# Patient Record
Sex: FEMALE | Race: WHITE | Employment: OTHER | URBAN - METROPOLITAN AREA
[De-identification: names, ages, dates, MRNs, and addresses within clinical notes are randomized per-mention and may not be internally consistent; named-entity substitution may affect disease eponyms.]

---

## 2020-08-17 ENCOUNTER — PRE VISIT (OUTPATIENT)
Dept: CARDIOLOGY | Facility: CLINIC | Age: 85
End: 2020-08-17

## 2020-08-17 ASSESSMENT — MIFFLIN-ST. JEOR: SCORE: 1194.38

## 2020-08-17 NOTE — TELEPHONE ENCOUNTER
Called [facility] in [place], [place] and requested films of CT 08/10/20, echos 01/23/20 & 06/23/19.  Films to be mailed.    Shanelre to push WESLEY from 07/29/20    Reports reviewed by Dr Rainey, patient to be seen in clinic on Monday, with surgery later same week.

## 2020-08-18 ENCOUNTER — PREP FOR PROCEDURE (OUTPATIENT)
Dept: CARDIOLOGY | Facility: CLINIC | Age: 85
End: 2020-08-18

## 2020-08-18 DIAGNOSIS — Z01.810 PRE-OPERATIVE CARDIOVASCULAR EXAMINATION: Primary | ICD-10-CM

## 2020-08-18 DIAGNOSIS — I35.0 AORTIC STENOSIS: Primary | ICD-10-CM

## 2020-08-18 NOTE — TELEPHONE ENCOUNTER
Called and spoke to patient regarding clinic and surgery dates.  Patient spoke to sister Caty and they decided the best week for them to come to [facility] [place] is the week of 09/21.      Will schedule clinic visit on 09/21 and surgery on 09/23.  Will mail information for surgery, clinic, logistics and EVELYNE to patient.

## 2020-08-19 PROBLEM — I35.0 AORTIC STENOSIS: Status: ACTIVE | Noted: 2020-08-19

## 2020-08-19 NOTE — TELEPHONE ENCOUNTER
FUTURE VISIT INFORMATION      SURGERY INFORMATION:    Date: 20    Location: [facility] OR    Surgeon:  Jey Rainey MD     Anesthesia Type:  General    Procedure: redo sternotomy, replacement of mechanical aortic valve originally replaced in , and all other associated procedures     RECORDS REQUESTED FROM:       Primary Care Provider: Duke Christian MD - [facility]    Pertinent Medical History: Aortic Stenosis    Most recent EKG+ Tracin20    Most recent ECHO: 20Clinton Mcclain

## 2020-08-20 DIAGNOSIS — Z11.59 ENCOUNTER FOR SCREENING FOR OTHER VIRAL DISEASES: Primary | ICD-10-CM

## 2020-08-25 NOTE — PROGRESS NOTES
Emailed patient's sister Pat instructions and pass code to initiate setting up a [employer] account.

## 2020-09-07 ASSESSMENT — ENCOUNTER SYMPTOMS
HALLUCINATIONS: 0
LIGHT-HEADEDNESS: 0
FEVER: 0
FATIGUE: 1
PALPITATIONS: 0
HYPOTENSION: 0
SLEEP DISTURBANCES DUE TO BREATHING: 0
DECREASED APPETITE: 0
INCREASED ENERGY: 0
LEG PAIN: 0
SYNCOPE: 0
POLYDIPSIA: 0
POLYPHAGIA: 0
EXERCISE INTOLERANCE: 1
WEIGHT GAIN: 0
CHILLS: 0
ORTHOPNEA: 0
ALTERED TEMPERATURE REGULATION: 0
WEIGHT LOSS: 0
SWOLLEN GLANDS: 0
BRUISES/BLEEDS EASILY: 1
NIGHT SWEATS: 0
HYPERTENSION: 1

## 2020-09-14 VITALS — WEIGHT: 181.22 LBS | BODY MASS INDEX: 36.53 KG/M2 | HEIGHT: 59 IN

## 2020-09-14 DIAGNOSIS — Z79.01 LONG TERM CURRENT USE OF ANTICOAGULANT THERAPY: Primary | ICD-10-CM

## 2020-09-14 PROBLEM — I35.0 AORTIC STENOSIS: Status: RESOLVED | Noted: 2020-08-19 | Resolved: 2020-09-14

## 2020-09-14 PROBLEM — H35.3131 EARLY DRY STAGE NONEXUDATIVE AGE-RELATED MACULAR DEGENERATION OF BOTH EYES: Status: ACTIVE | Noted: 2020-01-13

## 2020-09-14 PROBLEM — I35.9 AORTIC VALVE DISORDER: Status: ACTIVE | Noted: 2020-07-21

## 2020-09-14 PROBLEM — I35.9 AORTIC VALVE DISORDER: Status: RESOLVED | Noted: 2020-07-21 | Resolved: 2020-09-14

## 2020-09-14 RX ORDER — DORZOLAMIDE HYDROCHLORIDE AND TIMOLOL MALEATE 20; 5 MG/ML; MG/ML
1 SOLUTION/ DROPS OPHTHALMIC 2 TIMES DAILY
COMMUNITY

## 2020-09-14 RX ORDER — SPIRONOLACTONE 25 MG/1
TABLET ORAL
Status: ON HOLD | COMMUNITY
Start: 2020-01-22 | End: 2020-10-02

## 2020-09-14 RX ORDER — METOPROLOL TARTRATE 50 MG
TABLET ORAL
Status: ON HOLD | COMMUNITY
Start: 2020-01-22 | End: 2020-10-02

## 2020-09-14 RX ORDER — SIMVASTATIN 20 MG
20 TABLET ORAL AT BEDTIME
COMMUNITY
Start: 2020-01-22 | End: 2021-01-26

## 2020-09-14 RX ORDER — WARFARIN SODIUM 4 MG/1
TABLET ORAL
Status: ON HOLD | COMMUNITY
Start: 2020-01-22 | End: 2020-10-02

## 2020-09-14 RX ORDER — LATANOPROST 50 UG/ML
1 SOLUTION/ DROPS OPHTHALMIC AT BEDTIME
COMMUNITY

## 2020-09-14 RX ORDER — CELECOXIB 100 MG/1
100 CAPSULE ORAL
Status: ON HOLD | COMMUNITY
Start: 2020-08-04 | End: 2020-10-02

## 2020-09-14 ASSESSMENT — MIFFLIN-ST. JEOR: SCORE: 1195.37

## 2020-09-14 NOTE — PROGRESS NOTES
Called patient to let her know her lovenox was ordered and sent to [facility] in [place] this am and reviewed stopping her coumadin and the the schedule for her lovenox.  Patient wrote down instructions and verbalized understanding.  Patient has contact information and will call with questions or concerns.

## 2020-09-18 DIAGNOSIS — Z95.2 HISTORY OF AORTIC VALVE REPLACEMENT: Primary | ICD-10-CM

## 2020-09-18 DIAGNOSIS — I35.0 AORTIC STENOSIS: ICD-10-CM

## 2020-09-18 PROBLEM — Z96.1 PSEUDOPHAKIA, BOTH EYES: Status: ACTIVE | Noted: 2020-01-13

## 2020-09-18 PROBLEM — H26.491 RIGHT POSTERIOR CAPSULAR OPACIFICATION: Status: ACTIVE | Noted: 2020-01-13

## 2020-09-18 PROBLEM — H40.1134: Status: ACTIVE | Noted: 2020-01-13

## 2020-09-18 RX ORDER — PANTOPRAZOLE SODIUM 40 MG/1
40 TABLET, DELAYED RELEASE ORAL
Status: CANCELLED | OUTPATIENT
Start: 2020-09-23

## 2020-09-18 RX ORDER — CEFAZOLIN SODIUM 2 G/50ML
2 SOLUTION INTRAVENOUS
Status: CANCELLED | OUTPATIENT
Start: 2020-09-23

## 2020-09-18 RX ORDER — ACETAMINOPHEN 325 MG/1
975 TABLET ORAL ONCE
Status: CANCELLED | OUTPATIENT
Start: 2020-09-23

## 2020-09-18 RX ORDER — CEFAZOLIN SODIUM 1 G/50ML
1 INJECTION, SOLUTION INTRAVENOUS SEE ADMIN INSTRUCTIONS
Status: CANCELLED | OUTPATIENT
Start: 2020-09-23

## 2020-09-18 RX ORDER — MUPIROCIN 20 MG/G
1 OINTMENT TOPICAL 2 TIMES DAILY
Status: CANCELLED | OUTPATIENT
Start: 2020-09-23 | End: 2020-09-24

## 2020-09-18 NOTE — PHARMACY - PREOPERATIVE ASSESSMENT CENTER
"Anticoagulation Note - Preoperative Assessment Center (PAC) Pharmacist     Patient was interviewed via phone call on September 18, 2020 prior to PAC clinic appointment. The purpose of this note is to document the perioperative anticoagulation plan outlined by the providers caring for Monika Bee.     Current Regimen  Anticoagulation Regimen as of September 18, 2020: warfarin 2 mg on fridays and 4 mg on all other days of the week. Takes at 4:30 PM. Patient started holding this today in anticipation for upcoming surgery.   Indication: mechanical AVR  Prescriber:  josephed by [facility] molina.   Expected Duration of therapy: lifelong  INR Goal: 2-3    Last Scr 1.24 mg/dL (previously ~0.95 mg/dL which appears to be baseline).  CrCl ~ 33 ml/min based on adjusted body weight   Recommend recheck SCr at preop visit to ensure no worsening of renal function.      Perioperative plan  Monika Bee is scheduled for redo sternotomy, replacement of mechanical aortic valve originally replaced in 2004 on 9/23/20 with Dr Rainey and the perioperative anticoagulation plan outlined by cardiovascular surgery team is outlined in letter from Rocio Schaefer RN on 8/19/20 and copied below for reference.     Resumption of anticoagulation after procedure will be based on surgery team assessment of bleeding risks and complications.  This plan may require re-assessment and modification by her primary team in the perioperative setting depending on patients clinical situation.        Kirshna Newman, [facility]  September 18, 2020  1:06 PM      \"Take your last dose of coumadin on Thursday September 17TH     On Saturday Sept 19th start Lovenox as follows:     Saturday 09/19 inject 1st dose of Lovenox in the evening.  Sunday 09/20 and Monday 09/21 inject one dose of Lovenox in the morning and one dose in the evening.  Tuesday 09/22 inject last dose of Lovenox in the morning only.\"  "

## 2020-09-21 ENCOUNTER — OFFICE VISIT (OUTPATIENT)
Dept: CARDIOLOGY | Facility: CLINIC | Age: 85
DRG: 228 | End: 2020-09-21
Attending: SURGERY
Payer: COMMERCIAL

## 2020-09-21 ENCOUNTER — PRE VISIT (OUTPATIENT)
Dept: SURGERY | Facility: CLINIC | Age: 85
End: 2020-09-21

## 2020-09-21 ENCOUNTER — ANCILLARY PROCEDURE (OUTPATIENT)
Dept: CT IMAGING | Facility: CLINIC | Age: 85
End: 2020-09-21
Attending: SURGERY
Payer: COMMERCIAL

## 2020-09-21 ENCOUNTER — ANESTHESIA EVENT (OUTPATIENT)
Dept: SURGERY | Facility: CLINIC | Age: 85
DRG: 228 | End: 2020-09-21
Payer: COMMERCIAL

## 2020-09-21 ENCOUNTER — ANCILLARY PROCEDURE (OUTPATIENT)
Dept: GENERAL RADIOLOGY | Facility: CLINIC | Age: 85
End: 2020-09-21
Attending: SURGERY
Payer: COMMERCIAL

## 2020-09-21 ENCOUNTER — ANCILLARY PROCEDURE (OUTPATIENT)
Dept: ULTRASOUND IMAGING | Facility: CLINIC | Age: 85
End: 2020-09-21
Attending: SURGERY
Payer: COMMERCIAL

## 2020-09-21 ENCOUNTER — OFFICE VISIT (OUTPATIENT)
Dept: SURGERY | Facility: CLINIC | Age: 85
End: 2020-09-21
Payer: COMMERCIAL

## 2020-09-21 ENCOUNTER — APPOINTMENT (OUTPATIENT)
Dept: LAB | Facility: CLINIC | Age: 85
End: 2020-09-21
Payer: COMMERCIAL

## 2020-09-21 VITALS
HEIGHT: 60 IN | DIASTOLIC BLOOD PRESSURE: 74 MMHG | SYSTOLIC BLOOD PRESSURE: 140 MMHG | BODY MASS INDEX: 35.99 KG/M2 | HEART RATE: 91 BPM | OXYGEN SATURATION: 99 %

## 2020-09-21 VITALS
BODY MASS INDEX: 34.55 KG/M2 | HEART RATE: 91 BPM | HEIGHT: 60 IN | OXYGEN SATURATION: 99 % | WEIGHT: 176 LBS | SYSTOLIC BLOOD PRESSURE: 140 MMHG | RESPIRATION RATE: 14 BRPM | DIASTOLIC BLOOD PRESSURE: 71 MMHG

## 2020-09-21 DIAGNOSIS — Z01.810 PRE-OPERATIVE CARDIOVASCULAR EXAMINATION: ICD-10-CM

## 2020-09-21 DIAGNOSIS — Z01.818 PREOP EXAMINATION: Primary | ICD-10-CM

## 2020-09-21 DIAGNOSIS — I35.0 NONRHEUMATIC AORTIC VALVE STENOSIS: Primary | ICD-10-CM

## 2020-09-21 DIAGNOSIS — Z11.59 ENCOUNTER FOR SCREENING FOR OTHER VIRAL DISEASES: ICD-10-CM

## 2020-09-21 LAB
ALBUMIN SERPL-MCNC: 3.7 G/DL (ref 3.4–5)
ALBUMIN UR-MCNC: NEGATIVE MG/DL
ALP SERPL-CCNC: 74 U/L (ref 40–150)
ALT SERPL W P-5'-P-CCNC: 22 U/L (ref 0–50)
ANION GAP SERPL CALCULATED.3IONS-SCNC: 7 MMOL/L (ref 3–14)
APPEARANCE UR: ABNORMAL
APTT PPP: 33 SEC (ref 22–37)
AST SERPL W P-5'-P-CCNC: 41 U/L (ref 0–45)
BILIRUB DIRECT SERPL-MCNC: 0.2 MG/DL (ref 0–0.2)
BILIRUB SERPL-MCNC: 0.8 MG/DL (ref 0.2–1.3)
BILIRUB UR QL STRIP: NEGATIVE
BUN SERPL-MCNC: 19 MG/DL (ref 7–30)
CALCIUM SERPL-MCNC: 9.1 MG/DL (ref 8.5–10.1)
CHLORIDE SERPL-SCNC: 106 MMOL/L (ref 94–109)
CO2 SERPL-SCNC: 26 MMOL/L (ref 20–32)
COLOR UR AUTO: YELLOW
CREAT SERPL-MCNC: 0.93 MG/DL (ref 0.52–1.04)
ERYTHROCYTE [DISTWIDTH] IN BLOOD BY AUTOMATED COUNT: 13 % (ref 10–15)
GFR SERPL CREATININE-BSD FRML MDRD: 57 ML/MIN/{1.73_M2}
GLUCOSE SERPL-MCNC: 100 MG/DL (ref 70–99)
GLUCOSE UR STRIP-MCNC: NEGATIVE MG/DL
HCT VFR BLD AUTO: 36 % (ref 35–47)
HGB BLD-MCNC: 11.5 G/DL (ref 11.7–15.7)
HGB UR QL STRIP: ABNORMAL
INR PPP: 1.54 (ref 0.86–1.14)
INTERPRETATION ECG - MUSE: NORMAL
KETONES UR STRIP-MCNC: NEGATIVE MG/DL
LEUKOCYTE ESTERASE UR QL STRIP: ABNORMAL
MCH RBC QN AUTO: 33.2 PG (ref 26.5–33)
MCHC RBC AUTO-ENTMCNC: 31.9 G/DL (ref 31.5–36.5)
MCV RBC AUTO: 104 FL (ref 78–100)
MUCOUS THREADS #/AREA URNS LPF: PRESENT /LPF
NITRATE UR QL: NEGATIVE
PH UR STRIP: 5 PH (ref 5–7)
PLATELET # BLD AUTO: 311 10E9/L (ref 150–450)
POTASSIUM SERPL-SCNC: 4.1 MMOL/L (ref 3.4–5.3)
PROT SERPL-MCNC: 7.9 G/DL (ref 6.8–8.8)
RBC # BLD AUTO: 3.46 10E12/L (ref 3.8–5.2)
RBC #/AREA URNS AUTO: 2 /HPF (ref 0–2)
SARS-COV-2 RNA SPEC QL NAA+PROBE: NOT DETECTED
SODIUM SERPL-SCNC: 138 MMOL/L (ref 133–144)
SOURCE: ABNORMAL
SP GR UR STRIP: 1.02 (ref 1–1.03)
SPECIMEN SOURCE: NORMAL
SQUAMOUS #/AREA URNS AUTO: <1 /HPF (ref 0–1)
UROBILINOGEN UR STRIP-MCNC: 0 MG/DL (ref 0–2)
WBC # BLD AUTO: 7.9 10E9/L (ref 4–11)
WBC #/AREA URNS AUTO: 35 /HPF (ref 0–5)

## 2020-09-21 PROCEDURE — 36415 COLL VENOUS BLD VENIPUNCTURE: CPT | Performed by: SURGERY

## 2020-09-21 PROCEDURE — 81001 URINALYSIS AUTO W/SCOPE: CPT | Performed by: SURGERY

## 2020-09-21 PROCEDURE — 80076 HEPATIC FUNCTION PANEL: CPT | Performed by: SURGERY

## 2020-09-21 PROCEDURE — 85730 THROMBOPLASTIN TIME PARTIAL: CPT | Performed by: SURGERY

## 2020-09-21 PROCEDURE — 86902 BLOOD TYPE ANTIGEN DONOR EA: CPT | Performed by: SURGERY

## 2020-09-21 PROCEDURE — 87086 URINE CULTURE/COLONY COUNT: CPT | Performed by: SURGERY

## 2020-09-21 PROCEDURE — 86850 RBC ANTIBODY SCREEN: CPT | Performed by: SURGERY

## 2020-09-21 PROCEDURE — 86900 BLOOD TYPING SEROLOGIC ABO: CPT | Performed by: SURGERY

## 2020-09-21 PROCEDURE — 85027 COMPLETE CBC AUTOMATED: CPT | Performed by: SURGERY

## 2020-09-21 PROCEDURE — 85610 PROTHROMBIN TIME: CPT | Performed by: SURGERY

## 2020-09-21 PROCEDURE — 86870 RBC ANTIBODY IDENTIFICATION: CPT | Performed by: SURGERY

## 2020-09-21 PROCEDURE — 86901 BLOOD TYPING SEROLOGIC RH(D): CPT | Performed by: SURGERY

## 2020-09-21 PROCEDURE — 86922 COMPATIBILITY TEST ANTIGLOB: CPT | Performed by: SURGERY

## 2020-09-21 PROCEDURE — 86905 BLOOD TYPING RBC ANTIGENS: CPT | Performed by: SURGERY

## 2020-09-21 PROCEDURE — 80048 BASIC METABOLIC PNL TOTAL CA: CPT | Performed by: SURGERY

## 2020-09-21 ASSESSMENT — PAIN SCALES - GENERAL
PAINLEVEL: NO PAIN (0)
PAINLEVEL: NO PAIN (0)

## 2020-09-21 ASSESSMENT — MIFFLIN-ST. JEOR: SCORE: 1171.89

## 2020-09-21 ASSESSMENT — LIFESTYLE VARIABLES: TOBACCO_USE: 0

## 2020-09-21 ASSESSMENT — ENCOUNTER SYMPTOMS: SEIZURES: 1

## 2020-09-21 NOTE — LETTER
9/21/2020      RE: Monika Bee  [address]  [address]  [place] [address]       Dear Colleague,    Thank you for the opportunity to participate in the care of your patient, Monika Bee, at the [facility]. Please see a copy of my visit note below.    HPI  Monika Bee is a 85 y/o female who presents for evaluation of prosthetic valve stenosis.  She had a mechanical aortic valve replacement about 20 years ago. 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 EF remains normal at 65. She also has moderate mitral stenosis. She has had a syncopal episode and has an implantable loop recorder in. She had another syncopal episode in early February, but her loop recorder interrogation on 2/20/2020 showed no obvious cardiac rhythm responsible for syncope. She had previous thoracotomy for aortic valve replacement. She does have some dyspnea on exertion, but she finds that not to be unusual. She now presents for the above procedure.     PMH is also significant for s/p right knee arthroplasty and hx left femur fracture s/p surgical repair.     History was obtained from patient & chart review.      Past Medical History  Past Medical History        Past Medical History:   Diagnosis Date     Aortic aneurysm (H) 9/19/2012     Mild dilation of the ascending aorta measuring 3.8cm. Mild dilation of the ascending aorta measuring 3.8cm.     Aortic stenosis 8/19/2020     Added automatically from request for surgery [id number]     Aortic valve disorder 7/21/2020     A. S/p aortic valve replacement 2004 with 21mm [facility] [place] B. Last echocardiogram 2006 demonstrating armida functioning valve -- mean gradient of 16mmHg. Normal LV size and function, no other significant valve disease. A. S/p aortic valve replacement 2004 with 21mm [facility] Romeo B. Last echocardiogram 2006 demonstrating armida functioning valve -- mean gradient of  16mmHg. Normal LV size and function, no oth     Dyslipidemia 12/4/2013     Early dry stage nonexudative age-related macular degeneration of both eyes 1/13/2020     Hypertension, benign essential, goal below 140/90 12/4/2013     Long term current use of anticoagulant therapy 7/16/2009     Osteoarthrosis involving lower leg 7/21/2020     Ostium secundum type atrial septal defect 9/19/2012     Closed during aortic valve replacement surgically in 2004 Closed during aortic valve replacement surgically in 2004     PFO (patent foramen ovale) 9/19/2012     Closed during aortic valve replacement surgically in 2004 Closed during aortic valve replacement surgically in 2004     Vitamin D deficiency 1/18/2013           Past Surgical History  Past Surgical History         Past Surgical History:   Procedure Laterality Date     AS TOTAL KNEE ARTHROPLASTY Right 2015     CARPAL TUNNEL RELEASE RT/LT         CATARACT IOL, RT/LT         FEMUR SURGERY Left       fracture repair     REPAIR VALVE AORTIC          Prior to Admission Medications  Active Medications          Current Outpatient Medications   Medication Sig Dispense Refill     calcium carbonate 600 mg-vitamin D 400 units (CALTRATE) 600-400 MG-UNIT per tablet Take 2 tablets by mouth daily         carboxymethylcellulose PF (REFRESH PLUS) 0.5 % ophthalmic solution Place 1 drop into both eyes 3 times daily as needed for dry eyes         celecoxib (CELEBREX) 100 MG capsule Take 100 mg by mouth daily (with breakfast)          dorzolamide-timolol (COSOPT) 2-0.5 % ophthalmic solution Place 1 drop into both eyes 2 times daily          latanoprost (XALATAN) 0.005 % ophthalmic solution Place 1 drop into both eyes At Bedtime          Methylcellulose, Laxative, (CITRUCEL PO) Take 2 capsules by mouth daily         metoprolol tartrate (LOPRESSOR) 50 MG tablet TAKE 1 TABLET BY MOUTH TWICE A DAY         Multiple Vitamins-Minerals (OCUVITE ADULT FORMULA PO) Take 1 tablet by mouth daily           multivitamin (CENTRUM SILVER) tablet Take 1 tablet by mouth daily         simvastatin (ZOCOR) 20 MG tablet Take 20 mg by mouth At Bedtime          spironolactone (ALDACTONE) 25 MG tablet TAKE 1 TABLET(25 MG) BY MOUTH EVERY DAY IN THE MORNING         warfarin ANTICOAGULANT (COUMADIN) 4 MG tablet Take 2 mg (1/2 tablet) on Fridays and 4 mg by mouth on all other days of the week.  Takes at 4:30 PM.               Allergies  No Known Allergies     Social History  Social History   Social History            Socioeconomic History     Marital status: Single       Spouse name: Not on file     Number of children: Not on file     Years of education: Not on file     Highest education level: Not on file   Occupational History     Not on file   Social Needs     Financial resource strain: Not on file     Food insecurity       Worry: Not on file       Inability: Not on file     Transportation needs       Medical: Not on file       Non-medical: Not on file   Tobacco Use     Smoking status: Never Smoker     Smokeless tobacco: Never Used   Substance and Sexual Activity     Alcohol use: Yes       Alcohol/week: 6.0 standard drinks       Types: 6 Glasses of wine per week       Frequency: 2-4 times a month       Drinks per session: 1 or 2       Binge frequency: Weekly     Drug use: Not on file     Sexual activity: Not on file   Lifestyle     Physical activity       Days per week: Not on file       Minutes per session: Not on file     Stress: Not on file   Relationships     Social connections       Talks on phone: Not on file       Gets together: Not on file       Attends [employer] service: Not on file       Active member of club or organization: Not on file       Attends meetings of clubs or organizations: Not on file       Relationship status: Not on file     Intimate partner violence       Fear of current or ex partner: Not on file       Emotionally abused: Not on file       Physically abused: Not on file       Forced sexual activity: Not  "on file   Other Topics Concern     Not on file   Social History Narrative     Not on file           Family History  Family History      Reviewed    ROS  10 point review of systems is negative other than noted in the HPI or here.        Physical Exam    BP: 130/76 Pulse: 91   Resp: 14 SpO2: 99 %          176 lbs 0 oz  4' 11.5\"   Body mass index is 34.95 kg/m .  Constitutional: Awake, alert, cooperative, not in distress  Eyes: Pupils equal, round and reactive to light, extra ocular muscles intact, sclera clear, conjunctiva normal.  HENT: Normocephalic, oral pharynx with moist mucus membranes, good dentition. No goiter appreciated. No removable dental hardware.  Respiratory: Clear to auscultation bilaterally, no crackles or wheezing. No SOB when supine.  Cardiovascular: Regular rate and rhythm, normal S1 and S2, and 2/6 murmur noted.  Carotids +2, no bruits. No edema. Palpable pulses to radial, DP and PT arteries. Sternotomy scar well healed.  GI: Normal bowel sounds, soft, obese, non-tender   Skin: Warm and dry.  No rashes.   Musculoskeletal: mildly limited extension ROM of neck. There is no redness, warmth, or swelling of the joints. Gross motor strength is normal.    Neurologic: no focal deficits  Neuropsychiatric: Calm, cooperative. Normal affect.     ECHOCARDIOGRAM 7/29/20  CONCLUSION:  1. Normal left ventricular size and function.  Ejection fraction is 65%.  2. Mechanical aortic valve prosthesis with functional moderate stenosis and trivial regurgitation.  3. Mild mitral stenosis.   4. No evidence of intracardiac shunt, thrombus or vegetation.     HEART CATH 7/29/20  Conclusions    1. Normal coronary arteries.    2. Malfunctioning mechanical aortic valve with the leaflets not fully  opening during systole.  Recommendations    * Cardiothoracic surgical consultation.  Diagnostic Summary    * Left main is normal.    * Left anterior descending artery had mild luminal irregularities.    * Left circumflex is " nondominant and normal.    * The right coronary artery is the dominant vessel with mild luminal  irregularities.    * On fluoroscopy, aortic valve leaflets do open and close.  However, the  leaflets do not fully open suggesting likely scar tissue present around the  mechanical aortic valve.    ASSESSMENT and PLAN  Monika Bee is a 84 year old female with prosthetic aortic valve stenosis with symptoms. I agree with the plan for redo AVR. I discussed the risks and benefits of surgery including risks of death, bleeding, stroke, infection, renal failure and pacemaker. She understands and is willing to proceed with surgery. I have told her that we will try to place a tissue valve but for anatomic reasons, a mechanical valve may be used. She understands this and is willing to proceed with surgery.      Please do not hesitate to contact me if you have any questions/concerns.     Sincerely,     Jey Rainey MD

## 2020-09-21 NOTE — PATIENT INSTRUCTIONS
Preparing for Your Surgery      Name:  Monika Bee   MRN:  [id number]   :  1935   Today's Date:  2020       Arriving for surgery:  Surgery date:  2020  Arrival time:  5:30AM    Restrictions due to COVID 19:  Patients are allowed one visitor in the pre-op period  All visitors must wear a mask  No visitors under 18  No ill visitors   parking is not available     Please come to:       [facility] [address] [place] [address]  [id number]     -    Please proceed to the Surgery Lounge on the 3rd floor. [phone]?     - ?If you are in need of directions, wheelchair or escort please stop at the Information Desk in the lobby.  Inform the information person that you are here for surgery; a wheelchair and escort will be provided to the Surgery Lounge .?     What can I eat or drink?  -  You may eat and drink normally for up to 8 hours before your surgery. (Until 2020, 11:30PM)  -  You may have clear liquids until 2 hours before surgery. (Until 2020, 5:30AM)  Examples of clear liquids:  Water  Clear broth  Juices (apple, white grape, white cranberry  and cider) without pulp  Noncarbonated, powder based beverages  (lemonade and Rodrigue-Aid)  Sodas (Sprite, 7-Up, ginger ale and seltzer)  Coffee or tea (without milk or cream)  Gatorade    -  No Alcohol for at least 24 hours before surgery     Which medicines can I take?    Hold Multivitamins for 10 days before surgery.  Hold Supplements for 10 days before surgery.  Please hold all antinflammatory medications for 10 days prior, including Celecoxib(Celebrex).    Wafarin(Coumadin) and Enoxaparin(Lovenox) per Rocio Schaefer RN instructions:  Take your last dose of coumadin on      On  start Lovenox as follows:      inject 1st dose of Lovenox in the evening.   and  inject one dose of Lovenox in the morning and one dose in the  evening.  Tuesday 09/22 inject last dose of Lovenox in the morning only, take as early as 6 AM.     -  DO NOT take these medications the day of surgery:    Citrucel   Spironolactone    -  PLEASE TAKE these medications the day of surgery:    Cosopt eye drops   Refresh eye drops as needed    Metoprolol        How do I prepare myself?  - Please shower the evening before and the morning of surgery using Scrubcare or Hibiclens soap.    Use this soap only from the neck to your toes.     Leave the soap on your skin for one minute--then rinse thoroughly.      You may use your own shampoo and conditioner; no other hair products.   - Please remove all jewelry and body piercings.  - No lotions, deodorants or fragrance.  - No makeup or fingernail polish.   - Bring your ID and insurance card.    - All patients are required to have a Covid-19 test within 4 days of surgery/procedure.      -Patients will be contacted by the [facility] scheduling team within 1 week of surgery to make an appointment.      - Patients may call the Scheduling team at [phone] if they have not been scheduled within 4 days of  surgery.        Questions or Concerns:    - For any questions regarding the day of surgery or your hospital stay, please contact the Pre Admission Nursing Office at [phone].       - If you have health changes between today and your surgery please call your surgeon.       For questions after surgery please call your surgeons office.

## 2020-09-21 NOTE — H&P
Pre-Operative H & P     CC:  Preoperative exam to assess for increased cardiopulmonary risk while undergoing surgery and anesthesia.    Date of Encounter: 9/21/2020  Primary Care Physician:  [facility]  Reason for Visit: Aortic stenosis    HPI  Monika Bee is a 83 y/o female who presents for pre-operative H&P in preparation for redo sternotomy, replacement of mechanical aortic valve originally replaced in 2004, and all other associated procedures with Jey Rainey MD on 9/23/20 at [facility] for treatment of Aortic stenosis.    Ms. Bee has a history of aortic stenosis. She had a mechanical aortic valve replacement about 20 years ago. 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 EF remains normal at 65. She also has moderate mitral stenosis. She has had a syncopal episode and has an implantable loop recorder in. She had another syncopal episode in early February, but her loop recorder interrogation on 2/20/2020 showed no obvious cardiac rhythm responsible for syncope. She had previous thoracotomy for aortic valve replacement. She does have some dyspnea on exertion, but she finds that not to be unusual. She now presents for the above procedure.    PMH is also significant for s/p right knee arthroplasty and hx left femur fracture s/p surgical repair.    History was obtained from patient & chart review.     Past Medical History  Past Medical History:   Diagnosis Date     Aortic aneurysm (H) 9/19/2012    Mild dilation of the ascending aorta measuring 3.8cm. Mild dilation of the ascending aorta measuring 3.8cm.     Aortic stenosis 8/19/2020    Added automatically from request for surgery [id number]     Aortic valve disorder 7/21/2020    A. S/p aortic valve replacement 2004 with 21mm [facility] [place] B. Last echocardiogram 2006 demonstrating armida functioning valve -- mean gradient of 16mmHg. Normal LV size and function,  no other significant valve disease. A. S/p aortic valve replacement 2004 with 21mm [facility] [place] B. Last echocardiogram 2006 demonstrating armida functioning valve -- mean gradient of 16mmHg. Normal LV size and function, no oth     Dyslipidemia 12/4/2013     Early dry stage nonexudative age-related macular degeneration of both eyes 1/13/2020     Hypertension, benign essential, goal below 140/90 12/4/2013     Long term current use of anticoagulant therapy 7/16/2009     Osteoarthrosis involving lower leg 7/21/2020     Ostium secundum type atrial septal defect 9/19/2012    Closed during aortic valve replacement surgically in 2004 Closed during aortic valve replacement surgically in 2004     PFO (patent foramen ovale) 9/19/2012    Closed during aortic valve replacement surgically in 2004 Closed during aortic valve replacement surgically in 2004     Vitamin D deficiency 1/18/2013       Past Surgical History  Past Surgical History:   Procedure Laterality Date     AS TOTAL KNEE ARTHROPLASTY Right 2015     CARPAL TUNNEL RELEASE RT/LT       CATARACT IOL, RT/LT       FEMUR SURGERY Left     fracture repair     REPAIR VALVE AORTIC  2004       Hx of Blood transfusions/reactions: no     Hx of abnormal bleeding or anti-platelet use: on warfarin    Menstrual history: No LMP recorded. Patient is postmenopausal.:      Steroid use in the last year: no    Personal or FH with difficulty with Anesthesia:  no    Prior to Admission Medications  Current Outpatient Medications   Medication Sig Dispense Refill     calcium carbonate 600 mg-vitamin D 400 units (CALTRATE) 600-400 MG-UNIT per tablet Take 2 tablets by mouth daily       carboxymethylcellulose PF (REFRESH PLUS) 0.5 % ophthalmic solution Place 1 drop into both eyes 3 times daily as needed for dry eyes       celecoxib (CELEBREX) 100 MG capsule Take 100 mg by mouth daily (with breakfast)        dorzolamide-timolol (COSOPT) 2-0.5 % ophthalmic solution Place 1 drop into both eyes 2 times daily         latanoprost (XALATAN) 0.005 % ophthalmic solution Place 1 drop into both eyes At Bedtime        Methylcellulose, Laxative, (CITRUCEL PO) Take 2 capsules by mouth daily       metoprolol tartrate (LOPRESSOR) 50 MG tablet TAKE 1 TABLET BY MOUTH TWICE A DAY       Multiple Vitamins-Minerals (OCUVITE ADULT FORMULA PO) Take 1 tablet by mouth daily        multivitamin (CENTRUM SILVER) tablet Take 1 tablet by mouth daily       simvastatin (ZOCOR) 20 MG tablet Take 20 mg by mouth At Bedtime        spironolactone (ALDACTONE) 25 MG tablet TAKE 1 TABLET(25 MG) BY MOUTH EVERY DAY IN THE MORNING       warfarin ANTICOAGULANT (COUMADIN) 4 MG tablet Take 2 mg (1/2 tablet) on Fridays and 4 mg by mouth on all other days of the week.  Takes at 4:30 PM.         Allergies  No Known Allergies    Social History  Social History     Socioeconomic History     Marital status: Single     Spouse name: Not on file     Number of children: Not on file     Years of education: Not on file     Highest education level: Not on file   Occupational History     Not on file   Social Needs     Financial resource strain: Not on file     Food insecurity     Worry: Not on file     Inability: Not on file     Transportation needs     Medical: Not on file     Non-medical: Not on file   Tobacco Use     Smoking status: Never Smoker     Smokeless tobacco: Never Used   Substance and Sexual Activity     Alcohol use: Yes     Alcohol/week: 6.0 standard drinks     Types: 6 Glasses of wine per week     Frequency: 2-4 times a month     Drinks per session: 1 or 2     Binge frequency: Weekly     Drug use: Not on file     Sexual activity: Not on file   Lifestyle     Physical activity     Days per week: Not on file     Minutes per session: Not on file     Stress: Not on file   Relationships     Social connections     Talks on phone: Not on file     Gets together: Not on file     Attends Yazidism service: Not on file     Active member of club or organization: Not on file  "    Attends meetings of clubs or organizations: Not on file     Relationship status: Not on file     Intimate partner violence     Fear of current or ex partner: Not on file     Emotionally abused: Not on file     Physically abused: Not on file     Forced sexual activity: Not on file   Other Topics Concern     Not on file   Social History Narrative     Not on file       Family History  Family History   Problem Relation Age of Onset     Cardiovascular Father      Colon Cancer Father      Anesthesia Reaction No family hx of      Deep Vein Thrombosis (DVT) No family hx of            Preop Vitals    BP Readings from Last 3 Encounters:   09/21/20 (!) 140/71   09/21/20 (!) 140/74    Pulse Readings from Last 3 Encounters:   09/21/20 91   09/21/20 91      Resp Readings from Last 3 Encounters:   09/21/20 14    SpO2 Readings from Last 3 Encounters:   09/21/20 99%   09/21/20 99%      Temp Readings from Last 1 Encounters:   No data found for Temp    Ht Readings from Last 1 Encounters:   09/21/20 1.511 m (4' 11.5\")      Wt Readings from Last 1 Encounters:   09/21/20 79.8 kg (176 lb)    Estimated body mass index is 34.95 kg/m  as calculated from the following:    Height as of this encounter: 1.511 m (4' 11.5\").    Weight as of this encounter: 79.8 kg (176 lb).       ROS/MED HX  The complete review of systems is negative other than noted in the HPI or here.  Patient denies recent illness, fever and respiratory infection during past month.  Pt denies steroid use during past year.    ENT/Pulmonary:  - neg pulmonary ROS    (-) tobacco use, asthma and sleep apnea   Neurologic:     (+)seizures last seizure: 1967    (-) CVA and Neuropathy   Cardiovascular: Comment: S/P AVR in 2004    (+) Dyslipidemia, hypertension----. Taking blood thinners, on Lovenox bridge currently . . . :. valvular problems/murmurs type: AS . Previous cardiac testing Echodate:7/29/20results:date: results:ECG reviewed date:7/29/20 results:Cath date: 7/29/20 results: " "         METS/Exercise Tolerance: Comment: Has significant DA SILVA when ambulating 1 - Eating, dressing   Hematologic:  - neg hematologic  ROS      (-) history of blood clots and History of Transfusion   Musculoskeletal: Comment: S/P right knee arthroplasty    Hx left femur fracture, s/p surgery    Diffuse arthritis        GI/Hepatic:  - neg GI/hepatic ROS      (-) GERD and liver disease   Renal/Genitourinary:  - ROS Renal section negative       Endo:     (+) Obesity, .   (-) Type II DM   Psychiatric:  - neg psychiatric ROS       Infectious Disease:  - neg infectious disease ROS       Malignancy:      - no malignancy   Other:    (+) H/O Chronic Pain,               PHYSICAL EXAM:   Mental Status/Neuro: A/A/O; Age Appropriate   Airway: Facies: Feasible  Mallampati: II  Mouth/Opening: Full  TM distance: > 6 cm  Neck ROM: Full   Respiratory: Auscultation: CTAB     Resp. Rate: Normal     Resp. Effort: Normal      CV: Rhythm: Regular  Rate: Age appropriate  Heart: Murmur (Systolic; soft)  Edema: None   Comments:      Dental: Normal Dentition                BP: (!) 140/71 Pulse: 91   Resp: 14 SpO2: 99 %         176 lbs 0 oz  4' 11.5\"   Body mass index is 34.95 kg/m .    Physical Exam  Constitutional: Awake, alert, cooperative, no apparent distress, and appears stated age.  Eyes: Pupils equal, round and reactive to light, extra ocular muscles intact, sclera clear, conjunctiva normal.  HENT: Normocephalic, oral pharynx with moist mucus membranes, good dentition. No goiter appreciated. No removable dental hardware.  Respiratory: Clear to auscultation bilaterally, no crackles or wheezing. No SOB when supine.  Cardiovascular: Regular rate and rhythm, normal S1 and S2, and 2/6 murmur noted.  Carotids +2, no bruits. No edema. Palpable pulses to radial, DP and PT arteries. Sternotomy scar well healed.  GI: Normal bowel sounds, soft, obese, non-tender, no masses palpated.    Lymph/Hematologic: No cervical lymphadenopathy and no " supraclavicular lymphadenopathy.  Genitourinary:  deferred  Skin: Warm and dry.  No rashes.   Musculoskeletal: mildly limited extension ROM of neck. There is no redness, warmth, or swelling of the joints. Gross motor strength is normal.    Neurologic: Awake, alert, oriented to name, place and time. Cranial nerves II-XII are grossly intact. Gait is antalgic. Ambulates from chair to exam table, seats self, lies supine and sits back up w/o assistance.  Neuropsychiatric: Calm, cooperative. Normal affect. Pleasant. Answers questions appropriately, follows commands w/o difficulty.      PRIOR LABS/DIAGNOSTIC STUDIES:  All labs and imaging personally reviewed    EKG 7/29/20  Sinus rhythm with Premature supraventricular complexes  Otherwise normal ECG  No previous ECGs available  Ventricular rate 88 bpm    ECHOCARDIOGRAM 7/29/20  CONCLUSION:  1. Normal left ventricular size and function.  Ejection fraction is 65%.  2. Mechanical aortic valve prosthesis with functional moderate stenosis and trivial regurgitation.  3. Mild mitral stenosis.   4. No evidence of intracardiac shunt, thrombus or vegetation.    HEART CATH 7/29/20  Conclusions    1. Normal coronary arteries.    2. Malfunctioning mechanical aortic valve with the leaflets not fully  opening during systole.  Recommendations    * Cardiothoracic surgical consultation.  Diagnostic Summary    * Left main is normal.    * Left anterior descending artery had mild luminal irregularities.    * Left circumflex is nondominant and normal.    * The right coronary artery is the dominant vessel with mild luminal  irregularities.    * On fluoroscopy, aortic valve leaflets do open and close.  However, the  leaflets do not fully open suggesting likely scar tissue present around the  mechanical aortic valve.    CT chest abdomen and pelvis angiogram 8/10/20  FINDINGS:     CTA CHEST:    There is coronary artery calcification and there has been previous median sternotomy. There is additional  atherosclerotic calcification in the thoracic aorta. No acute aortic abnormalities.    No mediastinal, hilar or axillary lymphadenopathy.    The lungs are clear without consolidation or pleural effusion. There are no suspicious pulmonary nodules. There is a granuloma within the right upper lobe.    CTA ABDOMEN PELVIS:    No acute abnormalities within the liver or spleen. No acute findings in the kidneys, adrenal glands, pancreas stomach or gallbladder.    There is patchy atherosclerotic calcification within the abdominal aorta. There is no aneurysm. The mesenteric vessels are patent.    There are no discrete acute findings within the bowel or mesentery.    There is mild aponeurotic calcification in the common, internal and external iliac arteries. No vascular stenosis or occlusion.    There is a 1.9 cm area of contrast accumulation in the right iliopsoas muscle near the right femoral artery (arterial image 237 of 259). There is additional note of masslike enlargement of the right iliacus muscle in the pelvis (image 190) likely secondary to intramuscular hematoma. No additional periaortic hematoma.    There are degenerative changes in the thoracolumbar spine and pelvis. No acute osseous abnormalities detected.    IMPRESSION:    1.9 cm focus of contrast accumulation in the right iliopsoas muscle at the level of the right hip. Diagnostic considerations would include a pseudoaneurysm or focus of intramuscular bleeding.    Masslike enlargement of the right iliacus muscle in the pelvis likely secondary to intramuscular hematoma.    No acute CT findings in the thoracoabdominal aorta.        LABS:  CBC:   Lab Results   Component Value Date    WBC 7.9 09/21/2020    HGB 11.5 (L) 09/21/2020    HCT 36.0 09/21/2020     09/21/2020     BMP:   Lab Results   Component Value Date     09/21/2020    POTASSIUM 4.1 09/21/2020    CHLORIDE 106 09/21/2020    CO2 26 09/21/2020    BUN 19 09/21/2020    CR 0.93 09/21/2020    GLC  100 (H) 09/21/2020     COAGS:   Lab Results   Component Value Date    PTT 33 09/21/2020    INR 1.54 (H) 09/21/2020     POC: No results found for: BGM, HCG, HCGS  OTHER:   Lab Results   Component Value Date    RYANN 9.1 09/21/2020    ALBUMIN 3.7 09/21/2020    PROTTOTAL 7.9 09/21/2020    ALT 22 09/21/2020    AST 41 09/21/2020    ALKPHOS 74 09/21/2020    BILITOTAL 0.8 09/21/2020          Outside records reviewed from: Care Everywhere    ASSESSMENT and PLAN  Monika Bee is a 84 year old female scheduled to undergo redo sternotomy, replacement of mechanical aortic valve originally replaced in 2004, and all other associated procedures with Jey Rainey MD on 9/23/20 at [facility] for treatment of Aortic stenosis.     Pt has had prior anesthetic.     No history of anesthetic complications    She has the following specific operative considerations:   # SANJUANITA 3/8 = intermediate risk  # VTE risk: 0.5%  # Risk of PONV score = 3.  If > 2, anti-emetic intervention recommended.  # Anesthesia considerations:  Refer to PAC assessment in anesthesia records    # Increased risk of postoperative nausea/vomiting: Recommend use of antiemetic agents in the perioperative period.          CARDIAC: METS 1-2,  Has significant DA SILVA when ambulating      # RCRI : High risk surgery.  0.9% risk of major adverse cardiac event.     #  Severe aortic stenosis, s/p AVR in 2004, now w/ recurrence     #  Cath 7/29/20: no CAD     #  Echo 7/29/20:  EF 65%       PULMONARY:     # Never smoked    # No asthma or inhaler use    GI: denies GERD      ENDO: BMI 38    # No DM    HEME:   # Anticoagulated with coumadin, currently on Lovenox bridge (last dose scheduled for tomorrow AM)  # Positive antibody screen on T&S drawn today    ORTHO: mildly limited extension ROM of neck, no TMJ    # s/p right knee arthroplasty 2015    # Hx left femur fracture, s/p surgery    Patient was discussed with Dr Prasad. Patient is  optimized and is acceptable candidate for the proposed procedure. No further diagnostic evaluation is needed.    Arrival time, NPO, shower and medication instructions provided by nursing staff today.  Preparing For Your Surgery handout given.      Lala Espinoza PA-C  Preoperative Assessment Center  [facility]  Phone: [phone]  Fax: [phone]

## 2020-09-21 NOTE — NURSING NOTE
Chief Complaint   Patient presents with     New Patient     ump new consult for redo sternotomy      Vitals were taken and medications were reconciled.   Negin Rodríguez  2:28 PM

## 2020-09-21 NOTE — ANESTHESIA PREPROCEDURE EVALUATION
"Anesthesia Pre-Procedure Evaluation    Patient: Monika Bee   MRN:     [id number] Gender:   female   Age:    84 year old :      1935        Preoperative Diagnosis: Aortic stenosis [I35.0]   Procedure(s):  redo sternotomy, replacement of mechanical aortic valve originally replaced in , and all other associated procedures     LABS:  CBC:   Lab Results   Component Value Date    WBC 7.9 2020    HGB 11.5 (L) 2020    HCT 36.0 2020     2020     BMP:   Lab Results   Component Value Date     2020    POTASSIUM 4.1 2020    CHLORIDE 106 2020    CO2 26 2020    BUN 19 2020    CR 0.93 2020     (H) 2020     COAGS:   Lab Results   Component Value Date    PTT 33 2020    INR 1.54 (H) 2020     POC: No results found for: BGM, HCG, HCGS  OTHER:   Lab Results   Component Value Date    RYANN 9.1 2020    ALBUMIN 3.7 2020    PROTTOTAL 7.9 2020    ALT 22 2020    AST 41 2020    ALKPHOS 74 2020    BILITOTAL 0.8 2020        Preop Vitals    BP Readings from Last 3 Encounters:   20 (!) 140/71   20 (!) 140/74    Pulse Readings from Last 3 Encounters:   20 91   20 91      Resp Readings from Last 3 Encounters:   20 14    SpO2 Readings from Last 3 Encounters:   20 99%   20 99%      Temp Readings from Last 1 Encounters:   No data found for Temp    Ht Readings from Last 1 Encounters:   20 1.511 m (4' 11.5\")      Wt Readings from Last 1 Encounters:   20 79.8 kg (176 lb)    Estimated body mass index is 34.95 kg/m  as calculated from the following:    Height as of this encounter: 1.511 m (4' 11.5\").    Weight as of this encounter: 79.8 kg (176 lb).     LDA:        Past Medical History:   Diagnosis Date     Aortic aneurysm (H) 2012    Mild dilation of the ascending aorta measuring 3.8cm. Mild dilation of the ascending aorta measuring 3.8cm.     " Aortic stenosis 8/19/2020    Added automatically from request for surgery [id number]     Aortic valve disorder 7/21/2020    A. S/p aortic valve replacement 2004 with 21mm St. Romeo B. Last echocardiogram 2006 demonstrating armida functioning valve -- mean gradient of 16mmHg. Normal LV size and function, no other significant valve disease. A. S/p aortic valve replacement 2004 with 21mm St. Romeo B. Last echocardiogram 2006 demonstrating armida functioning valve -- mean gradient of 16mmHg. Normal LV size and function, no oth     Dyslipidemia 12/4/2013     Early dry stage nonexudative age-related macular degeneration of both eyes 1/13/2020     Hypertension, benign essential, goal below 140/90 12/4/2013     Long term current use of anticoagulant therapy 7/16/2009     Osteoarthrosis involving lower leg 7/21/2020     Ostium secundum type atrial septal defect 9/19/2012    Closed during aortic valve replacement surgically in 2004 Closed during aortic valve replacement surgically in 2004     PFO (patent foramen ovale) 9/19/2012    Closed during aortic valve replacement surgically in 2004 Closed during aortic valve replacement surgically in 2004     Vitamin D deficiency 1/18/2013      Past Surgical History:   Procedure Laterality Date     AS TOTAL KNEE ARTHROPLASTY Right 2015     CARPAL TUNNEL RELEASE RT/LT       CATARACT IOL, RT/LT       FEMUR SURGERY Left     fracture repair     REPAIR VALVE AORTIC  2004      No Known Allergies     Anesthesia Evaluation     . Pt has had prior anesthetic.     No history of anesthetic complications          ROS/MED HX    ENT/Pulmonary:  - neg pulmonary ROS    (-) tobacco use, asthma and sleep apnea   Neurologic:     (+)seizures last seizure: 1967    (-) CVA and Neuropathy   Cardiovascular: Comment: S/P AVR in 2004    (+) Dyslipidemia, hypertension----. Taking blood thinners : Instructions Given to patient: on Lovenox bridge currently. . . :. valvular problems/murmurs type: AS . Previous cardiac  testing Echodate:7/29/20results:CONCLUSION:  1. Normal left ventricular size and function. Ejection fraction is 65%.    2. Mechanical aortic valve prosthesis with functional moderate stenosis and trivial regurgitation.  -Had been replaced with a mechanical prosthesis. The valve itself was not well visualized due to significant artifact. The peak velocity through the valve was 3.7 m/sec with a mean gradient of 32 mmHg consistent with moderate functional aortic stenosis. There was trivial valvular regurgitation.    3. Mild mitral stenosis.     4. No evidence of intracardiac shunt, thrombus or vegetation.     date: results:ECG reviewed date:7/29/20 results:Cath date: 7/29/20 results:Conclusions    1. Normal coronary arteries.    2. Malfunctioning mechanical aortic valve with the leaflets not fully  opening during systole.  Recommendations    * Cardiothoracic surgical consultation.  Diagnostic Summary    * Left main is normal.    * Left anterior descending artery had mild luminal irregularities.    * Left circumflex is nondominant and normal.    * The right coronary artery is the dominant vessel with mild luminal  irregularities.    * On fluoroscopy, aortic valve leaflets do open and close.  However, the  leaflets do not fully open suggesting likely scar tissue present around the  mechanical aortic valve.          METS/Exercise Tolerance: Comment: Has significant DA SILVA when ambulating 1 - Eating, dressing   Hematologic: Comments: Type and screen + for antibodies  - neg hematologic  ROS   (+) History of Transfusion -     (-) history of blood clots   Musculoskeletal: Comment: S/P right knee arthroplasty    Hx left femur fracture, s/p surgery    Diffuse arthritis        GI/Hepatic:  - neg GI/hepatic ROS      (-) GERD and liver disease   Renal/Genitourinary:  - ROS Renal section negative       Endo:     (+) Obesity, .   (-) Type II DM   Psychiatric:  - neg psychiatric ROS       Infectious Disease:  - neg infectious disease  ROS       Malignancy:      - no malignancy   Other:    (+) H/O Chronic Pain,                       PHYSICAL EXAM:   Mental Status/Neuro: A/A/O; Age Appropriate   Airway: Facies: Feasible  Mallampati: II  Mouth/Opening: Full  TM distance: > 6 cm  Neck ROM: Full   Respiratory: Auscultation: CTAB     Resp. Rate: Normal     Resp. Effort: Normal      CV: Rhythm: Regular  Rate: Age appropriate  Heart: Murmur (Systolic; soft)  Edema: None   Comments:      Dental: Normal Dentition                Assessment:   ASA SCORE: 3    H&P: History and physical reviewed and following examination; no interval change.   Smoking Status:  Non-Smoker/Unknown   NPO Status: NPO Appropriate     Plan:   Anes. Type:  General   Pre-Medication: None   Induction:  IV (Standard)   Airway: ETT; Oral   Access/Monitoring: PIV; 2nd PIV; A-Line; MAC-Line; PAC   Maintenance: Balanced     Blood products: Blood in Room; FFP; PRBC; Cell Saver     Drips/Meds: Vasopressin; Epinephrine; Norepi     Advanced Monitoring: BIS; WESLEY Adult; NIRS (cerebral)            ADULT WESLEY Checklist:               Absolute Contra-Indications: NONE               Relative Contra-Indications:  NONE               Final Plan: Proceed with WESLEY     Postop Plan:   Postop Pain: Opioids  Postop Sedation/Airway: Not planned  Disposition: ICU     PONV Management:   Adult Risk Factors: Female, Non-Smoker, Postop Opioids   Prevention: Ondansetron, Propofol     CONSENT: Direct conversation   Plan and risks discussed with: Patient                   PAC Discussion and Assessment    ASA Classification: 3  Case is suitable for: [place]  Anesthetic techniques and relevant risks discussed: GA  Invasive monitoring and risk discussed: No  Types:   Possibility and Risk of blood transfusion discussed: No  NPO instructions given:   Additional anesthetic preparation and risks discussed:   Needs early admission to pre-op area:   Other:     PAC Resident/NP Anesthesia Assessment:  Monika Bee is a 84  year old female scheduled to undergo redo sternotomy, replacement of mechanical aortic valve originally replaced in 2004, and all other associated procedures with Jey Rainey MD on 9/23/20 at [facility] for treatment of Aortic stenosis.     Pt has had prior anesthetic.     No history of anesthetic complications    She has the following specific operative considerations:   # SANJUANITA 3/8 = intermediate risk  # VTE risk: 0.5%  # Risk of PONV score = 3.  If > 2, anti-emetic intervention recommended.  # Anesthesia considerations:  Refer to PAC assessment in anesthesia records    # Increased risk of postoperative nausea/vomiting: Recommend use of antiemetic agents in the perioperative period.          CARDIAC: METS 1-2,  Has significant DA SILVA when ambulating      # RCRI : High risk surgery.  0.9% risk of major adverse cardiac event.     #  Severe aortic stenosis, s/p AVR in 2004, now w/ recurrence     #  Cath 7/29/20: no CAD     #  Echo 7/29/20:  EF 65%       PULMONARY:     # Never smoked    # No asthma or inhaler use    GI: denies GERD      ENDO: BMI 38    # No DM    HEME:   # Anticoagulated with coumadin, currently on Lovenox bridge (last dose scheduled for tomorrow AM)  # Positive antibody screen on T&S drawn today    ORTHO: mildly limited extension ROM of neck, no TMJ    # s/p right knee arthroplasty 2015    # Hx left femur fracture, s/p surgery    Patient was discussed with Dr Prasad. Patient is optimized and is acceptable candidate for the proposed procedure. No further diagnostic evaluation is needed.      Reviewed and Signed by PAC Mid-Level Provider/Resident  Mid-Level Provider/Resident: Lala Espinoza PA-C  Date: 9/21/20  Time: 1649    Attending Anesthesiologist Anesthesia Assessment:        Anesthesiologist:   Date:   Time:   Pass/Fail:   Disposition:     PAC Pharmacist Assessment:        Pharmacist:   Date:   Time:    Lala Espinoza PA-C

## 2020-09-22 LAB
BACTERIA SPEC CULT: NORMAL
Lab: NORMAL
SPECIMEN SOURCE: NORMAL

## 2020-09-23 ENCOUNTER — HOSPITAL ENCOUNTER (INPATIENT)
Facility: CLINIC | Age: 85
LOS: 9 days | Discharge: NURSING, REHAB OR LONG-TERM CARE | DRG: 228 | End: 2020-10-02
Attending: SURGERY | Admitting: SURGERY
Payer: COMMERCIAL

## 2020-09-23 ENCOUNTER — APPOINTMENT (OUTPATIENT)
Dept: GENERAL RADIOLOGY | Facility: CLINIC | Age: 85
DRG: 228 | End: 2020-09-23
Attending: SURGERY
Payer: COMMERCIAL

## 2020-09-23 ENCOUNTER — ANESTHESIA (OUTPATIENT)
Dept: SURGERY | Facility: CLINIC | Age: 85
DRG: 228 | End: 2020-09-23
Payer: COMMERCIAL

## 2020-09-23 ENCOUNTER — ANCILLARY PROCEDURE (OUTPATIENT)
Dept: ULTRASOUND IMAGING | Facility: CLINIC | Age: 85
End: 2020-09-23
Payer: COMMERCIAL

## 2020-09-23 DIAGNOSIS — I35.0 AORTIC STENOSIS: ICD-10-CM

## 2020-09-23 DIAGNOSIS — Z95.2 HISTORY OF AORTIC VALVE REPLACEMENT: ICD-10-CM

## 2020-09-23 DIAGNOSIS — Z95.2 H/O MECHANICAL AORTIC VALVE REPLACEMENT: Primary | ICD-10-CM

## 2020-09-23 LAB
ABO + RH BLD: ABNORMAL
ABO + RH BLD: ABNORMAL
ALBUMIN SERPL-MCNC: 2.5 G/DL (ref 3.4–5)
ALP SERPL-CCNC: 54 U/L (ref 40–150)
ALT SERPL W P-5'-P-CCNC: 23 U/L (ref 0–50)
ANGLE RATE OF CLOT GROWTH: 72.4 DEG (ref 59–74)
ANION GAP SERPL CALCULATED.3IONS-SCNC: 5 MMOL/L (ref 3–14)
APTT PPP: 32 SEC (ref 22–37)
APTT PPP: 36 SEC (ref 22–37)
AST SERPL W P-5'-P-CCNC: ABNORMAL U/L (ref 0–45)
BASE DEFICIT BLDA-SCNC: 0.7 MMOL/L
BASE DEFICIT BLDA-SCNC: 0.9 MMOL/L
BASE DEFICIT BLDA-SCNC: 1.2 MMOL/L
BASE DEFICIT BLDA-SCNC: 1.7 MMOL/L
BASE DEFICIT BLDA-SCNC: 1.9 MMOL/L
BASE DEFICIT BLDA-SCNC: 3 MMOL/L
BASE DEFICIT BLDA-SCNC: 3.1 MMOL/L
BASE DEFICIT BLDA-SCNC: 3.4 MMOL/L
BASE DEFICIT BLDA-SCNC: 5 MMOL/L
BASE DEFICIT BLDV-SCNC: 0.5 MMOL/L
BASE DEFICIT BLDV-SCNC: 0.9 MMOL/L
BASE DEFICIT BLDV-SCNC: 2.4 MMOL/L
BASE EXCESS BLDA CALC-SCNC: 0.2 MMOL/L
BASE EXCESS BLDA CALC-SCNC: 1.1 MMOL/L
BILIRUB SERPL-MCNC: 1.2 MG/DL (ref 0.2–1.3)
BLD GP AB INVEST PLASRBC-IMP: ABNORMAL
BLD GP AB SCN SERPL QL: ABNORMAL
BLD PROD TYP BPU: ABNORMAL
BLD PROD TYP BPU: NORMAL
BLD UNIT ID BPU: 0
BLOOD BANK CMNT PATIENT-IMP: ABNORMAL
BLOOD BANK CMNT PATIENT-IMP: ABNORMAL
BLOOD PRODUCT CODE: NORMAL
BPU ID: NORMAL
BUN SERPL-MCNC: 13 MG/DL (ref 7–30)
CA-I BLD-MCNC: 4.1 MG/DL (ref 4.4–5.2)
CA-I BLD-MCNC: 4.2 MG/DL (ref 4.4–5.2)
CA-I BLD-MCNC: 4.2 MG/DL (ref 4.4–5.2)
CA-I BLD-MCNC: 4.3 MG/DL (ref 4.4–5.2)
CA-I BLD-MCNC: 4.5 MG/DL (ref 4.4–5.2)
CA-I BLD-MCNC: 4.8 MG/DL (ref 4.4–5.2)
CA-I BLD-MCNC: 4.8 MG/DL (ref 4.4–5.2)
CA-I BLD-MCNC: 4.9 MG/DL (ref 4.4–5.2)
CA-I BLD-MCNC: 5.2 MG/DL (ref 4.4–5.2)
CALCIUM SERPL-MCNC: 8.2 MG/DL (ref 8.5–10.1)
CHLORIDE BLD-SCNC: 106 MMOL/L (ref 94–109)
CHLORIDE BLD-SCNC: 109 MMOL/L (ref 94–109)
CHLORIDE BLD-SCNC: 110 MMOL/L (ref 94–109)
CHLORIDE BLD-SCNC: 112 MMOL/L (ref 94–109)
CHLORIDE BLD-SCNC: 113 MMOL/L (ref 94–109)
CHLORIDE SERPL-SCNC: 113 MMOL/L (ref 94–109)
CI HYPERCOAGULATION INDEX: 2.4 RATIO (ref 0–3)
CLOT LYSIS 30M P MA LENFR BLD TEG: 1 % (ref 0–8)
CLOT STRENGTH BLD TEG: 11.2 KD/SC (ref 5.3–13.2)
CO2 SERPL-SCNC: 24 MMOL/L (ref 20–32)
CREAT SERPL-MCNC: 0.73 MG/DL (ref 0.52–1.04)
ERYTHROCYTE [DISTWIDTH] IN BLOOD BY AUTOMATED COUNT: 14.4 % (ref 10–15)
FIBRINOGEN PPP-MCNC: 344 MG/DL (ref 200–420)
GFR SERPL CREATININE-BSD FRML MDRD: 76 ML/MIN/{1.73_M2}
GLUCOSE BLD-MCNC: 141 MG/DL (ref 70–99)
GLUCOSE BLD-MCNC: 160 MG/DL (ref 70–99)
GLUCOSE BLD-MCNC: 160 MG/DL (ref 70–99)
GLUCOSE BLD-MCNC: 164 MG/DL (ref 70–99)
GLUCOSE BLD-MCNC: 169 MG/DL (ref 70–99)
GLUCOSE BLD-MCNC: 170 MG/DL (ref 70–99)
GLUCOSE BLD-MCNC: 189 MG/DL (ref 70–99)
GLUCOSE BLD-MCNC: 193 MG/DL (ref 70–99)
GLUCOSE BLDC GLUCOMTR-MCNC: 108 MG/DL (ref 70–99)
GLUCOSE BLDC GLUCOMTR-MCNC: 110 MG/DL (ref 70–99)
GLUCOSE BLDC GLUCOMTR-MCNC: 113 MG/DL (ref 70–99)
GLUCOSE BLDC GLUCOMTR-MCNC: 121 MG/DL (ref 70–99)
GLUCOSE BLDC GLUCOMTR-MCNC: 136 MG/DL (ref 70–99)
GLUCOSE BLDC GLUCOMTR-MCNC: 157 MG/DL (ref 70–99)
GLUCOSE BLDC GLUCOMTR-MCNC: 162 MG/DL (ref 70–99)
GLUCOSE SERPL-MCNC: 158 MG/DL (ref 70–99)
HBA1C MFR BLD: 5 % (ref 0–5.6)
HCO3 BLD-SCNC: 20 MMOL/L (ref 21–28)
HCO3 BLD-SCNC: 21 MMOL/L (ref 21–28)
HCO3 BLD-SCNC: 21 MMOL/L (ref 21–28)
HCO3 BLD-SCNC: 22 MMOL/L (ref 21–28)
HCO3 BLD-SCNC: 23 MMOL/L (ref 21–28)
HCO3 BLD-SCNC: 24 MMOL/L (ref 21–28)
HCO3 BLD-SCNC: 25 MMOL/L (ref 21–28)
HCO3 BLD-SCNC: 25 MMOL/L (ref 21–28)
HCO3 BLD-SCNC: 26 MMOL/L (ref 21–28)
HCO3 BLDV-SCNC: 23 MMOL/L (ref 21–28)
HCO3 BLDV-SCNC: 25 MMOL/L (ref 21–28)
HCO3 BLDV-SCNC: 26 MMOL/L (ref 21–28)
HCT VFR BLD AUTO: 36 % (ref 35–47)
HGB BLD-MCNC: 10.4 G/DL (ref 11.7–15.7)
HGB BLD-MCNC: 10.5 G/DL (ref 11.7–15.7)
HGB BLD-MCNC: 11.8 G/DL (ref 11.7–15.7)
HGB BLD-MCNC: 6.5 G/DL (ref 11.7–15.7)
HGB BLD-MCNC: 6.8 G/DL (ref 11.7–15.7)
HGB BLD-MCNC: 6.9 G/DL (ref 11.7–15.7)
HGB BLD-MCNC: 7.1 G/DL (ref 11.7–15.7)
HGB BLD-MCNC: 7.3 G/DL (ref 11.7–15.7)
HGB BLD-MCNC: 8.6 G/DL (ref 11.7–15.7)
HGB BLD-MCNC: 9 G/DL (ref 11.7–15.7)
INR PPP: 1.12 (ref 0.86–1.14)
INR PPP: 1.38 (ref 0.86–1.14)
INR PPP: 1.59 (ref 0.86–1.14)
K TIME TO SPEC CLOT STRENGTH: 1.2 MIN (ref 1–3)
LACTATE BLD-SCNC: 0.4 MMOL/L (ref 0.7–2)
LACTATE BLD-SCNC: 0.5 MMOL/L (ref 0.7–2)
LACTATE BLD-SCNC: 0.6 MMOL/L (ref 0.7–2)
LACTATE BLD-SCNC: 0.7 MMOL/L (ref 0.7–2)
LACTATE BLD-SCNC: 0.7 MMOL/L (ref 0.7–2)
LACTATE BLD-SCNC: 0.8 MMOL/L (ref 0.7–2)
LACTATE BLD-SCNC: 1.2 MMOL/L (ref 0.7–2)
LACTATE BLD-SCNC: 2.1 MMOL/L (ref 0.7–2)
LACTATE BLD-SCNC: 2.4 MMOL/L (ref 0.7–2)
LY60 LYSIS AT 60 MINUTES: 3.2 % (ref 0–15)
MA MAXIMUM CLOT STRENGTH: 69.1 MM (ref 55–74)
MAGNESIUM SERPL-MCNC: 2.6 MG/DL (ref 1.6–2.3)
MCH RBC QN AUTO: 32.2 PG (ref 26.5–33)
MCHC RBC AUTO-ENTMCNC: 32.8 G/DL (ref 31.5–36.5)
MCV RBC AUTO: 98 FL (ref 78–100)
NUM BPU REQUESTED: 4
NUM BPU REQUESTED: 4
O2/TOTAL GAS SETTING VFR VENT: 100 %
O2/TOTAL GAS SETTING VFR VENT: 40 %
O2/TOTAL GAS SETTING VFR VENT: 50 %
O2/TOTAL GAS SETTING VFR VENT: 70 %
O2/TOTAL GAS SETTING VFR VENT: 70 %
O2/TOTAL GAS SETTING VFR VENT: 80 %
O2/TOTAL GAS SETTING VFR VENT: 80 %
OXYHGB MFR BLD: 91 % (ref 92–100)
OXYHGB MFR BLD: 97 % (ref 92–100)
OXYHGB MFR BLD: 98 % (ref 92–100)
OXYHGB MFR BLDV: 60 %
OXYHGB MFR BLDV: 70 %
OXYHGB MFR BLDV: 78 %
PCO2 BLD: 31 MM HG (ref 35–45)
PCO2 BLD: 32 MM HG (ref 35–45)
PCO2 BLD: 32 MM HG (ref 35–45)
PCO2 BLD: 33 MM HG (ref 35–45)
PCO2 BLD: 39 MM HG (ref 35–45)
PCO2 BLD: 40 MM HG (ref 35–45)
PCO2 BLD: 40 MM HG (ref 35–45)
PCO2 BLD: 41 MM HG (ref 35–45)
PCO2 BLD: 43 MM HG (ref 35–45)
PCO2 BLD: 47 MM HG (ref 35–45)
PCO2 BLD: 68 MM HG (ref 35–45)
PCO2 BLDV: 40 MM HG (ref 40–50)
PCO2 BLDV: 44 MM HG (ref 40–50)
PCO2 BLDV: 49 MM HG (ref 40–50)
PH BLD: 7.16 PH (ref 7.35–7.45)
PH BLD: 7.33 PH (ref 7.35–7.45)
PH BLD: 7.37 PH (ref 7.35–7.45)
PH BLD: 7.38 PH (ref 7.35–7.45)
PH BLD: 7.39 PH (ref 7.35–7.45)
PH BLD: 7.39 PH (ref 7.35–7.45)
PH BLD: 7.42 PH (ref 7.35–7.45)
PH BLD: 7.43 PH (ref 7.35–7.45)
PH BLD: 7.43 PH (ref 7.35–7.45)
PH BLDV: 7.32 PH (ref 7.32–7.43)
PH BLDV: 7.36 PH (ref 7.32–7.43)
PH BLDV: 7.37 PH (ref 7.32–7.43)
PHOSPHATE SERPL-MCNC: 2.9 MG/DL (ref 2.5–4.5)
PLATELET # BLD AUTO: 175 10E9/L (ref 150–450)
PLATELET # BLD AUTO: 212 10E9/L (ref 150–450)
PO2 BLD: 101 MM HG (ref 80–105)
PO2 BLD: 106 MM HG (ref 80–105)
PO2 BLD: 108 MM HG (ref 80–105)
PO2 BLD: 211 MM HG (ref 80–105)
PO2 BLD: 268 MM HG (ref 80–105)
PO2 BLD: 328 MM HG (ref 80–105)
PO2 BLD: 350 MM HG (ref 80–105)
PO2 BLD: 353 MM HG (ref 80–105)
PO2 BLD: 368 MM HG (ref 80–105)
PO2 BLD: 457 MM HG (ref 80–105)
PO2 BLD: 62 MM HG (ref 80–105)
PO2 BLDV: 31 MM HG (ref 25–47)
PO2 BLDV: 38 MM HG (ref 25–47)
PO2 BLDV: 48 MM HG (ref 25–47)
POTASSIUM BLD-SCNC: 3.3 MMOL/L (ref 3.4–5.3)
POTASSIUM BLD-SCNC: 3.5 MMOL/L (ref 3.4–5.3)
POTASSIUM BLD-SCNC: 3.7 MMOL/L (ref 3.4–5.3)
POTASSIUM BLD-SCNC: 4 MMOL/L (ref 3.4–5.3)
POTASSIUM BLD-SCNC: 4.3 MMOL/L (ref 3.4–5.3)
POTASSIUM BLD-SCNC: 4.5 MMOL/L (ref 3.4–5.3)
POTASSIUM BLD-SCNC: 4.7 MMOL/L (ref 3.4–5.3)
POTASSIUM BLD-SCNC: 4.8 MMOL/L (ref 3.4–5.3)
POTASSIUM SERPL-SCNC: 4 MMOL/L (ref 3.4–5.3)
POTASSIUM SERPL-SCNC: 4 MMOL/L (ref 3.4–5.3)
PROT SERPL-MCNC: 5.3 G/DL (ref 6.8–8.8)
R TIME UNTIL CLOT FORMS: 5.2 MIN (ref 4–9)
RADIOLOGIST FLAGS: ABNORMAL
RBC # BLD AUTO: 3.66 10E12/L (ref 3.8–5.2)
SODIUM BLD-SCNC: 140 MMOL/L (ref 133–144)
SODIUM BLD-SCNC: 141 MMOL/L (ref 133–144)
SODIUM BLD-SCNC: 142 MMOL/L (ref 133–144)
SODIUM SERPL-SCNC: 142 MMOL/L (ref 133–144)
SPECIMEN EXP DATE BLD: ABNORMAL
TRANSFUSION STATUS PATIENT QL: NORMAL
WBC # BLD AUTO: 17.7 10E9/L (ref 4–11)

## 2020-09-23 PROCEDURE — 25000128 H RX IP 250 OP 636

## 2020-09-23 PROCEDURE — 82803 BLOOD GASES ANY COMBINATION: CPT

## 2020-09-23 PROCEDURE — 25000128 H RX IP 250 OP 636: Performed by: STUDENT IN AN ORGANIZED HEALTH CARE EDUCATION/TRAINING PROGRAM

## 2020-09-23 PROCEDURE — C9113 INJ PANTOPRAZOLE SODIUM, VIA: HCPCS | Performed by: STUDENT IN AN ORGANIZED HEALTH CARE EDUCATION/TRAINING PROGRAM

## 2020-09-23 PROCEDURE — 93503 INSERT/PLACE HEART CATHETER: CPT

## 2020-09-23 PROCEDURE — 40000196 ZZH STATISTIC RAPCV CVP MONITORING

## 2020-09-23 PROCEDURE — 82805 BLOOD GASES W/O2 SATURATION: CPT | Performed by: STUDENT IN AN ORGANIZED HEALTH CARE EDUCATION/TRAINING PROGRAM

## 2020-09-23 PROCEDURE — 25000125 ZZHC RX 250: Performed by: STUDENT IN AN ORGANIZED HEALTH CARE EDUCATION/TRAINING PROGRAM

## 2020-09-23 PROCEDURE — 85384 FIBRINOGEN ACTIVITY: CPT | Performed by: STUDENT IN AN ORGANIZED HEALTH CARE EDUCATION/TRAINING PROGRAM

## 2020-09-23 PROCEDURE — 93005 ELECTROCARDIOGRAM TRACING: CPT

## 2020-09-23 PROCEDURE — 37000009 ZZH ANESTHESIA TECHNICAL FEE, EACH ADDTL 15 MIN: Performed by: SURGERY

## 2020-09-23 PROCEDURE — 84075 ASSAY ALKALINE PHOSPHATASE: CPT

## 2020-09-23 PROCEDURE — 25000125 ZZHC RX 250: Performed by: SURGERY

## 2020-09-23 PROCEDURE — 82040 ASSAY OF SERUM ALBUMIN: CPT

## 2020-09-23 PROCEDURE — 25800030 ZZH RX IP 258 OP 636: Performed by: SURGERY

## 2020-09-23 PROCEDURE — P9016 RBC LEUKOCYTES REDUCED: HCPCS | Performed by: SURGERY

## 2020-09-23 PROCEDURE — 36000076 ZZH SURGERY LEVEL 6 EA 15 ADDTL MIN - UMMC: Performed by: SURGERY

## 2020-09-23 PROCEDURE — C1763 CONN TISS, NON-HUMAN: HCPCS | Performed by: SURGERY

## 2020-09-23 PROCEDURE — 88300 SURGICAL PATH GROSS: CPT | Performed by: SURGERY

## 2020-09-23 PROCEDURE — P9041 ALBUMIN (HUMAN),5%, 50ML: HCPCS | Performed by: STUDENT IN AN ORGANIZED HEALTH CARE EDUCATION/TRAINING PROGRAM

## 2020-09-23 PROCEDURE — 41000019 ZZH PERA-PERFUSION EACH ADDTL 15 MIN: Performed by: SURGERY

## 2020-09-23 PROCEDURE — 82435 ASSAY OF BLOOD CHLORIDE: CPT

## 2020-09-23 PROCEDURE — 00000146 ZZHCL STATISTIC GLUCOSE BY METER IP

## 2020-09-23 PROCEDURE — 27210447 ZZH PACK CELL SAVER CSP: Performed by: SURGERY

## 2020-09-23 PROCEDURE — 82810 BLOOD GASES O2 SAT ONLY: CPT

## 2020-09-23 PROCEDURE — 36415 COLL VENOUS BLD VENIPUNCTURE: CPT | Performed by: ANESTHESIOLOGY

## 2020-09-23 PROCEDURE — 93010 ELECTROCARDIOGRAM REPORT: CPT | Mod: 76 | Performed by: INTERNAL MEDICINE

## 2020-09-23 PROCEDURE — 36000074 ZZH SURGERY LEVEL 6 1ST 30 MIN - UMMC: Performed by: SURGERY

## 2020-09-23 PROCEDURE — 40000344 ZZHCL STATISTIC THAWING COMPONENT: Performed by: SURGERY

## 2020-09-23 PROCEDURE — 83735 ASSAY OF MAGNESIUM: CPT | Performed by: STUDENT IN AN ORGANIZED HEALTH CARE EDUCATION/TRAINING PROGRAM

## 2020-09-23 PROCEDURE — 84132 ASSAY OF SERUM POTASSIUM: CPT | Performed by: ANESTHESIOLOGY

## 2020-09-23 PROCEDURE — 25000128 H RX IP 250 OP 636: Performed by: SURGERY

## 2020-09-23 PROCEDURE — 83605 ASSAY OF LACTIC ACID: CPT

## 2020-09-23 PROCEDURE — 85396 CLOTTING ASSAY WHOLE BLOOD: CPT | Performed by: SURGERY

## 2020-09-23 PROCEDURE — 25800030 ZZH RX IP 258 OP 636

## 2020-09-23 PROCEDURE — 85610 PROTHROMBIN TIME: CPT | Performed by: STUDENT IN AN ORGANIZED HEALTH CARE EDUCATION/TRAINING PROGRAM

## 2020-09-23 PROCEDURE — 5A1221Z PERFORMANCE OF CARDIAC OUTPUT, CONTINUOUS: ICD-10-PCS | Performed by: SURGERY

## 2020-09-23 PROCEDURE — 40000275 ZZH STATISTIC RCP TIME EA 10 MIN

## 2020-09-23 PROCEDURE — 85610 PROTHROMBIN TIME: CPT | Performed by: SURGERY

## 2020-09-23 PROCEDURE — 85018 HEMOGLOBIN: CPT | Performed by: STUDENT IN AN ORGANIZED HEALTH CARE EDUCATION/TRAINING PROGRAM

## 2020-09-23 PROCEDURE — 84460 ALANINE AMINO (ALT) (SGPT): CPT

## 2020-09-23 PROCEDURE — 25000132 ZZH RX MED GY IP 250 OP 250 PS 637: Performed by: SURGERY

## 2020-09-23 PROCEDURE — 83605 ASSAY OF LACTIC ACID: CPT | Performed by: STUDENT IN AN ORGANIZED HEALTH CARE EDUCATION/TRAINING PROGRAM

## 2020-09-23 PROCEDURE — 40000986 XR CHEST PORT 1 VW

## 2020-09-23 PROCEDURE — 02WF0JZ REVISION OF SYNTHETIC SUBSTITUTE IN AORTIC VALVE, OPEN APPROACH: ICD-10-PCS | Performed by: SURGERY

## 2020-09-23 PROCEDURE — 80048 BASIC METABOLIC PNL TOTAL CA: CPT

## 2020-09-23 PROCEDURE — 88305 TISSUE EXAM BY PATHOLOGIST: CPT | Performed by: SURGERY

## 2020-09-23 PROCEDURE — 82947 ASSAY GLUCOSE BLOOD QUANT: CPT

## 2020-09-23 PROCEDURE — C1713 ANCHOR/SCREW BN/BN,TIS/BN: HCPCS | Performed by: SURGERY

## 2020-09-23 PROCEDURE — 85049 AUTOMATED PLATELET COUNT: CPT | Performed by: SURGERY

## 2020-09-23 PROCEDURE — 25000565 ZZH ISOFLURANE, EA 15 MIN: Performed by: SURGERY

## 2020-09-23 PROCEDURE — 40000171 ZZH STATISTIC PRE-PROCEDURE ASSESSMENT III: Performed by: SURGERY

## 2020-09-23 PROCEDURE — 27110028 ZZH OR GENERAL SUPPLY NON-STERILE: Performed by: SURGERY

## 2020-09-23 PROCEDURE — 25000125 ZZHC RX 250

## 2020-09-23 PROCEDURE — 40000048 ZZH STATISTIC DAILY SWAN MONITORING

## 2020-09-23 PROCEDURE — 83036 HEMOGLOBIN GLYCOSYLATED A1C: CPT | Performed by: STUDENT IN AN ORGANIZED HEALTH CARE EDUCATION/TRAINING PROGRAM

## 2020-09-23 PROCEDURE — 84155 ASSAY OF PROTEIN SERUM: CPT

## 2020-09-23 PROCEDURE — 27110038 ZZH RX 271: Performed by: STUDENT IN AN ORGANIZED HEALTH CARE EDUCATION/TRAINING PROGRAM

## 2020-09-23 PROCEDURE — 82247 BILIRUBIN TOTAL: CPT

## 2020-09-23 PROCEDURE — 82330 ASSAY OF CALCIUM: CPT

## 2020-09-23 PROCEDURE — 27810169 ZZH OR IMPLANT GENERAL: Performed by: SURGERY

## 2020-09-23 PROCEDURE — 85730 THROMBOPLASTIN TIME PARTIAL: CPT | Performed by: SURGERY

## 2020-09-23 PROCEDURE — P9059 PLASMA, FRZ BETWEEN 8-24HOUR: HCPCS | Performed by: SURGERY

## 2020-09-23 PROCEDURE — 84295 ASSAY OF SERUM SODIUM: CPT

## 2020-09-23 PROCEDURE — 40000014 ZZH STATISTIC ARTERIAL MONITORING DAILY

## 2020-09-23 PROCEDURE — 84100 ASSAY OF PHOSPHORUS: CPT | Performed by: STUDENT IN AN ORGANIZED HEALTH CARE EDUCATION/TRAINING PROGRAM

## 2020-09-23 PROCEDURE — 20000004 ZZH R&B ICU UMMC

## 2020-09-23 PROCEDURE — 25000132 ZZH RX MED GY IP 250 OP 250 PS 637: Performed by: STUDENT IN AN ORGANIZED HEALTH CARE EDUCATION/TRAINING PROGRAM

## 2020-09-23 PROCEDURE — 84132 ASSAY OF SERUM POTASSIUM: CPT

## 2020-09-23 PROCEDURE — 85730 THROMBOPLASTIN TIME PARTIAL: CPT | Performed by: STUDENT IN AN ORGANIZED HEALTH CARE EDUCATION/TRAINING PROGRAM

## 2020-09-23 PROCEDURE — 27210460 ZZH PUMP APP ADULT PERFUSION: Performed by: SURGERY

## 2020-09-23 PROCEDURE — 27210794 ZZH OR GENERAL SUPPLY STERILE: Performed by: SURGERY

## 2020-09-23 PROCEDURE — 85610 PROTHROMBIN TIME: CPT | Performed by: ANESTHESIOLOGY

## 2020-09-23 PROCEDURE — 37000008 ZZH ANESTHESIA TECHNICAL FEE, 1ST 30 MIN: Performed by: SURGERY

## 2020-09-23 PROCEDURE — 40000986 XR ABDOMEN PORT 1 VW

## 2020-09-23 PROCEDURE — 85027 COMPLETE CBC AUTOMATED: CPT | Performed by: STUDENT IN AN ORGANIZED HEALTH CARE EDUCATION/TRAINING PROGRAM

## 2020-09-23 PROCEDURE — 82330 ASSAY OF CALCIUM: CPT | Performed by: STUDENT IN AN ORGANIZED HEALTH CARE EDUCATION/TRAINING PROGRAM

## 2020-09-23 PROCEDURE — 25000301 ZZH OR RX SURGIFLO W/THROMBIN KIT 2ML 1991 OPNP: Performed by: SURGERY

## 2020-09-23 PROCEDURE — 41000018 ZZH PER-PERFUSION 1ST 30 MIN: Performed by: SURGERY

## 2020-09-23 DEVICE — GRAFT PERICARDIUM 6X8CM BOVINE E6P8: Type: IMPLANTABLE DEVICE | Site: AORTA | Status: FUNCTIONAL

## 2020-09-23 DEVICE — SU DEVICE COR-KNOT MINI 4X14MM 031350: Type: IMPLANTABLE DEVICE | Site: CHEST | Status: FUNCTIONAL

## 2020-09-23 DEVICE — SU DEVICE ENDO COR KNOT QUICK LOAD 030850: Type: IMPLANTABLE DEVICE | Site: AORTA | Status: FUNCTIONAL

## 2020-09-23 DEVICE — VALVE AORTIC REGENT FLEX-CUFF 19MM 19AGFN-756: Type: IMPLANTABLE DEVICE | Site: AORTA | Status: FUNCTIONAL

## 2020-09-23 RX ORDER — PROPOFOL 10 MG/ML
INJECTION, EMULSION INTRAVENOUS PRN
Status: DISCONTINUED | OUTPATIENT
Start: 2020-09-23 | End: 2020-09-23

## 2020-09-23 RX ORDER — HYDRALAZINE HYDROCHLORIDE 20 MG/ML
10 INJECTION INTRAMUSCULAR; INTRAVENOUS EVERY 30 MIN PRN
Status: DISCONTINUED | OUTPATIENT
Start: 2020-09-23 | End: 2020-10-02 | Stop reason: HOSPADM

## 2020-09-23 RX ORDER — NOREPINEPHRINE BITARTRATE 0.06 MG/ML
0.03-0.4 INJECTION, SOLUTION INTRAVENOUS CONTINUOUS
Status: DISCONTINUED | OUTPATIENT
Start: 2020-09-23 | End: 2020-09-23 | Stop reason: HOSPADM

## 2020-09-23 RX ORDER — LIDOCAINE HYDROCHLORIDE 20 MG/ML
INJECTION, SOLUTION INFILTRATION; PERINEURAL PRN
Status: DISCONTINUED | OUTPATIENT
Start: 2020-09-23 | End: 2020-09-23

## 2020-09-23 RX ORDER — FENTANYL CITRATE 50 UG/ML
INJECTION, SOLUTION INTRAMUSCULAR; INTRAVENOUS PRN
Status: DISCONTINUED | OUTPATIENT
Start: 2020-09-23 | End: 2020-09-23

## 2020-09-23 RX ORDER — HEPARIN SODIUM 1000 [USP'U]/ML
INJECTION, SOLUTION INTRAVENOUS; SUBCUTANEOUS PRN
Status: DISCONTINUED | OUTPATIENT
Start: 2020-09-23 | End: 2020-09-23

## 2020-09-23 RX ORDER — CEFAZOLIN SODIUM 2 G/100ML
2 INJECTION, SOLUTION INTRAVENOUS EVERY 8 HOURS
Status: COMPLETED | OUTPATIENT
Start: 2020-09-23 | End: 2020-09-24

## 2020-09-23 RX ORDER — POTASSIUM CHLORIDE 29.8 MG/ML
20 INJECTION INTRAVENOUS
Status: DISCONTINUED | OUTPATIENT
Start: 2020-09-23 | End: 2020-10-02 | Stop reason: HOSPADM

## 2020-09-23 RX ORDER — FENTANYL CITRATE 50 UG/ML
25-50 INJECTION, SOLUTION INTRAMUSCULAR; INTRAVENOUS
Status: DISCONTINUED | OUTPATIENT
Start: 2020-09-23 | End: 2020-09-23 | Stop reason: HOSPADM

## 2020-09-23 RX ORDER — PANTOPRAZOLE SODIUM 40 MG/1
40 TABLET, DELAYED RELEASE ORAL
Status: COMPLETED | OUTPATIENT
Start: 2020-09-23 | End: 2020-09-23

## 2020-09-23 RX ORDER — MUPIROCIN 20 MG/G
1 OINTMENT TOPICAL 2 TIMES DAILY
Status: DISCONTINUED | OUTPATIENT
Start: 2020-09-23 | End: 2020-09-23 | Stop reason: HOSPADM

## 2020-09-23 RX ORDER — SODIUM CHLORIDE, SODIUM LACTATE, POTASSIUM CHLORIDE, CALCIUM CHLORIDE 600; 310; 30; 20 MG/100ML; MG/100ML; MG/100ML; MG/100ML
INJECTION, SOLUTION INTRAVENOUS CONTINUOUS PRN
Status: DISCONTINUED | OUTPATIENT
Start: 2020-09-23 | End: 2020-09-23

## 2020-09-23 RX ORDER — MAGNESIUM SULFATE HEPTAHYDRATE 40 MG/ML
4 INJECTION, SOLUTION INTRAVENOUS EVERY 4 HOURS PRN
Status: DISCONTINUED | OUTPATIENT
Start: 2020-09-23 | End: 2020-10-02 | Stop reason: HOSPADM

## 2020-09-23 RX ORDER — ACETAMINOPHEN 325 MG/1
975 TABLET ORAL 3 TIMES DAILY
Status: DISCONTINUED | OUTPATIENT
Start: 2020-09-23 | End: 2020-10-02

## 2020-09-23 RX ORDER — POTASSIUM CHLORIDE 1500 MG/1
20-40 TABLET, EXTENDED RELEASE ORAL
Status: DISCONTINUED | OUTPATIENT
Start: 2020-09-23 | End: 2020-09-28

## 2020-09-23 RX ORDER — MUPIROCIN 20 MG/G
0.5 OINTMENT TOPICAL 2 TIMES DAILY
Status: DISPENSED | OUTPATIENT
Start: 2020-09-23 | End: 2020-09-28

## 2020-09-23 RX ORDER — NICOTINE POLACRILEX 4 MG
15-30 LOZENGE BUCCAL
Status: DISCONTINUED | OUTPATIENT
Start: 2020-09-23 | End: 2020-09-25

## 2020-09-23 RX ORDER — NALOXONE HYDROCHLORIDE 0.4 MG/ML
.1-.4 INJECTION, SOLUTION INTRAMUSCULAR; INTRAVENOUS; SUBCUTANEOUS
Status: DISCONTINUED | OUTPATIENT
Start: 2020-09-23 | End: 2020-09-23

## 2020-09-23 RX ORDER — LIDOCAINE 40 MG/G
CREAM TOPICAL
Status: DISCONTINUED | OUTPATIENT
Start: 2020-09-23 | End: 2020-10-02 | Stop reason: HOSPADM

## 2020-09-23 RX ORDER — POTASSIUM CHLORIDE 1.5 G/1.58G
20-40 POWDER, FOR SOLUTION ORAL
Status: DISCONTINUED | OUTPATIENT
Start: 2020-09-23 | End: 2020-10-02 | Stop reason: HOSPADM

## 2020-09-23 RX ORDER — ALBUMIN, HUMAN INJ 5% 5 %
12.5 SOLUTION INTRAVENOUS ONCE
Status: COMPLETED | OUTPATIENT
Start: 2020-09-23 | End: 2020-09-23

## 2020-09-23 RX ORDER — MAGNESIUM SULFATE HEPTAHYDRATE 40 MG/ML
2 INJECTION, SOLUTION INTRAVENOUS DAILY PRN
Status: DISCONTINUED | OUTPATIENT
Start: 2020-09-23 | End: 2020-10-02 | Stop reason: HOSPADM

## 2020-09-23 RX ORDER — LIDOCAINE 40 MG/G
CREAM TOPICAL
Status: DISCONTINUED | OUTPATIENT
Start: 2020-09-23 | End: 2020-09-23 | Stop reason: HOSPADM

## 2020-09-23 RX ORDER — DEXTROSE MONOHYDRATE 100 MG/ML
INJECTION, SOLUTION INTRAVENOUS CONTINUOUS PRN
Status: DISCONTINUED | OUTPATIENT
Start: 2020-09-23 | End: 2020-10-02 | Stop reason: HOSPADM

## 2020-09-23 RX ORDER — CEFAZOLIN SODIUM 2 G/100ML
2 INJECTION, SOLUTION INTRAVENOUS
Status: COMPLETED | OUTPATIENT
Start: 2020-09-23 | End: 2020-09-23

## 2020-09-23 RX ORDER — PROPOFOL 10 MG/ML
5-75 INJECTION, EMULSION INTRAVENOUS CONTINUOUS
Status: DISCONTINUED | OUTPATIENT
Start: 2020-09-23 | End: 2020-09-24

## 2020-09-23 RX ORDER — CEFAZOLIN SODIUM 1 G/3ML
1 INJECTION, POWDER, FOR SOLUTION INTRAMUSCULAR; INTRAVENOUS SEE ADMIN INSTRUCTIONS
Status: DISCONTINUED | OUTPATIENT
Start: 2020-09-23 | End: 2020-09-23 | Stop reason: HOSPADM

## 2020-09-23 RX ORDER — PROTAMINE SULFATE 10 MG/ML
INJECTION, SOLUTION INTRAVENOUS PRN
Status: DISCONTINUED | OUTPATIENT
Start: 2020-09-23 | End: 2020-09-23

## 2020-09-23 RX ORDER — ACETAMINOPHEN 325 MG/1
975 TABLET ORAL ONCE
Status: COMPLETED | OUTPATIENT
Start: 2020-09-23 | End: 2020-09-23

## 2020-09-23 RX ORDER — NITROGLYCERIN 10 MG/100ML
INJECTION INTRAVENOUS PRN
Status: DISCONTINUED | OUTPATIENT
Start: 2020-09-23 | End: 2020-09-23

## 2020-09-23 RX ORDER — NOREPINEPHRINE BITARTRATE 0.06 MG/ML
0.03-0.4 INJECTION, SOLUTION INTRAVENOUS CONTINUOUS
Status: DISCONTINUED | OUTPATIENT
Start: 2020-09-23 | End: 2020-09-24

## 2020-09-23 RX ORDER — NALOXONE HYDROCHLORIDE 0.4 MG/ML
.1-.4 INJECTION, SOLUTION INTRAMUSCULAR; INTRAVENOUS; SUBCUTANEOUS
Status: DISCONTINUED | OUTPATIENT
Start: 2020-09-23 | End: 2020-10-02 | Stop reason: HOSPADM

## 2020-09-23 RX ORDER — BUPIVACAINE HYDROCHLORIDE 2.5 MG/ML
INJECTION, SOLUTION EPIDURAL; INFILTRATION; INTRACAUDAL PRN
Status: DISCONTINUED | OUTPATIENT
Start: 2020-09-23 | End: 2020-09-23

## 2020-09-23 RX ORDER — FLUMAZENIL 0.1 MG/ML
0.2 INJECTION, SOLUTION INTRAVENOUS
Status: DISCONTINUED | OUTPATIENT
Start: 2020-09-23 | End: 2020-09-23 | Stop reason: HOSPADM

## 2020-09-23 RX ORDER — POTASSIUM CHLORIDE 7.45 MG/ML
10 INJECTION INTRAVENOUS
Status: DISCONTINUED | OUTPATIENT
Start: 2020-09-23 | End: 2020-10-02 | Stop reason: HOSPADM

## 2020-09-23 RX ORDER — POTASSIUM CL/LIDO/0.9 % NACL 10MEQ/0.1L
10 INTRAVENOUS SOLUTION, PIGGYBACK (ML) INTRAVENOUS
Status: DISCONTINUED | OUTPATIENT
Start: 2020-09-23 | End: 2020-10-02 | Stop reason: HOSPADM

## 2020-09-23 RX ORDER — ASPIRIN 81 MG/1
81 TABLET, CHEWABLE ORAL DAILY
Status: DISCONTINUED | OUTPATIENT
Start: 2020-09-24 | End: 2020-10-02 | Stop reason: HOSPADM

## 2020-09-23 RX ORDER — PROPOFOL 10 MG/ML
INJECTION, EMULSION INTRAVENOUS CONTINUOUS PRN
Status: DISCONTINUED | OUTPATIENT
Start: 2020-09-23 | End: 2020-09-23

## 2020-09-23 RX ORDER — ALBUMIN, HUMAN INJ 5% 5 %
500-1000 SOLUTION INTRAVENOUS
Status: COMPLETED | OUTPATIENT
Start: 2020-09-23 | End: 2020-09-23

## 2020-09-23 RX ORDER — OXYCODONE HYDROCHLORIDE 5 MG/1
5 TABLET ORAL EVERY 4 HOURS PRN
Status: DISCONTINUED | OUTPATIENT
Start: 2020-09-23 | End: 2020-09-24

## 2020-09-23 RX ORDER — MEPERIDINE HYDROCHLORIDE 25 MG/ML
12.5-25 INJECTION INTRAMUSCULAR; INTRAVENOUS; SUBCUTANEOUS
Status: DISCONTINUED | OUTPATIENT
Start: 2020-09-23 | End: 2020-09-24

## 2020-09-23 RX ORDER — SODIUM CHLORIDE, SODIUM LACTATE, POTASSIUM CHLORIDE, CALCIUM CHLORIDE 600; 310; 30; 20 MG/100ML; MG/100ML; MG/100ML; MG/100ML
INJECTION, SOLUTION INTRAVENOUS CONTINUOUS
Status: DISCONTINUED | OUTPATIENT
Start: 2020-09-23 | End: 2020-09-23 | Stop reason: HOSPADM

## 2020-09-23 RX ORDER — DEXTROSE MONOHYDRATE 25 G/50ML
25-50 INJECTION, SOLUTION INTRAVENOUS
Status: DISCONTINUED | OUTPATIENT
Start: 2020-09-23 | End: 2020-09-25

## 2020-09-23 RX ORDER — HYDROMORPHONE HYDROCHLORIDE 1 MG/ML
.3-.5 INJECTION, SOLUTION INTRAMUSCULAR; INTRAVENOUS; SUBCUTANEOUS
Status: DISCONTINUED | OUTPATIENT
Start: 2020-09-23 | End: 2020-10-02

## 2020-09-23 RX ORDER — NALOXONE HYDROCHLORIDE 0.4 MG/ML
.1-.4 INJECTION, SOLUTION INTRAMUSCULAR; INTRAVENOUS; SUBCUTANEOUS
Status: DISCONTINUED | OUTPATIENT
Start: 2020-09-23 | End: 2020-09-23 | Stop reason: HOSPADM

## 2020-09-23 RX ADMIN — FENTANYL CITRATE 50 MCG: 50 INJECTION, SOLUTION INTRAMUSCULAR; INTRAVENOUS at 13:24

## 2020-09-23 RX ADMIN — MUPIROCIN 1 G: 20 OINTMENT TOPICAL at 06:34

## 2020-09-23 RX ADMIN — FENTANYL CITRATE 250 MCG: 50 INJECTION, SOLUTION INTRAMUSCULAR; INTRAVENOUS at 14:06

## 2020-09-23 RX ADMIN — NOREPINEPHRINE BITARTRATE 3.2 MCG: 1 INJECTION, SOLUTION, CONCENTRATE INTRAVENOUS at 12:52

## 2020-09-23 RX ADMIN — FENTANYL CITRATE 100 MCG: 50 INJECTION, SOLUTION INTRAMUSCULAR; INTRAVENOUS at 12:57

## 2020-09-23 RX ADMIN — SODIUM CHLORIDE, POTASSIUM CHLORIDE, SODIUM LACTATE AND CALCIUM CHLORIDE: 600; 310; 30; 20 INJECTION, SOLUTION INTRAVENOUS at 08:30

## 2020-09-23 RX ADMIN — FENTANYL CITRATE 150 MCG: 50 INJECTION, SOLUTION INTRAMUSCULAR; INTRAVENOUS at 08:23

## 2020-09-23 RX ADMIN — ALBUMIN HUMAN 500 ML: 0.05 INJECTION, SOLUTION INTRAVENOUS at 20:54

## 2020-09-23 RX ADMIN — PHENYLEPHRINE HYDROCHLORIDE 100 MCG: 10 INJECTION INTRAVENOUS at 10:43

## 2020-09-23 RX ADMIN — NOREPINEPHRINE BITARTRATE 12.8 MCG: 1 INJECTION, SOLUTION, CONCENTRATE INTRAVENOUS at 10:44

## 2020-09-23 RX ADMIN — Medication 2 G: at 08:55

## 2020-09-23 RX ADMIN — FENTANYL CITRATE 250 MCG: 50 INJECTION, SOLUTION INTRAMUSCULAR; INTRAVENOUS at 10:14

## 2020-09-23 RX ADMIN — ROCURONIUM BROMIDE 100 MG: 10 INJECTION INTRAVENOUS at 08:23

## 2020-09-23 RX ADMIN — PHENYLEPHRINE HYDROCHLORIDE 50 MCG: 10 INJECTION INTRAVENOUS at 09:53

## 2020-09-23 RX ADMIN — NITROGLYCERIN 100 MCG: 10 INJECTION INTRAVENOUS at 10:34

## 2020-09-23 RX ADMIN — FENTANYL CITRATE 50 MCG: 50 INJECTION, SOLUTION INTRAMUSCULAR; INTRAVENOUS at 13:47

## 2020-09-23 RX ADMIN — ROCURONIUM BROMIDE 20 MG: 10 INJECTION INTRAVENOUS at 09:32

## 2020-09-23 RX ADMIN — PROPOFOL 80 MG: 10 INJECTION, EMULSION INTRAVENOUS at 08:23

## 2020-09-23 RX ADMIN — SODIUM CHLORIDE 7.5 G: 900 INJECTION, SOLUTION INTRAVENOUS at 08:55

## 2020-09-23 RX ADMIN — Medication 0.02 MCG/KG/MIN: at 08:55

## 2020-09-23 RX ADMIN — Medication 14 ML/HR: at 12:00

## 2020-09-23 RX ADMIN — EPINEPHRINE 0.03 MCG/KG/MIN: 1 INJECTION PARENTERAL at 12:40

## 2020-09-23 RX ADMIN — ACETAMINOPHEN 975 MG: 325 TABLET, FILM COATED ORAL at 06:33

## 2020-09-23 RX ADMIN — OXYCODONE HYDROCHLORIDE 5 MG: 5 TABLET ORAL at 23:24

## 2020-09-23 RX ADMIN — SODIUM CHLORIDE, POTASSIUM CHLORIDE, SODIUM LACTATE AND CALCIUM CHLORIDE: 600; 310; 30; 20 INJECTION, SOLUTION INTRAVENOUS at 08:55

## 2020-09-23 RX ADMIN — NOREPINEPHRINE BITARTRATE 3.2 MCG: 1 INJECTION, SOLUTION, CONCENTRATE INTRAVENOUS at 09:54

## 2020-09-23 RX ADMIN — VANCOMYCIN HYDROCHLORIDE 1500 MG: 10 INJECTION, POWDER, LYOPHILIZED, FOR SOLUTION INTRAVENOUS at 20:57

## 2020-09-23 RX ADMIN — CEFAZOLIN SODIUM 2 G: 2 INJECTION, SOLUTION INTRAVENOUS at 22:55

## 2020-09-23 RX ADMIN — FENTANYL CITRATE 50 MCG: 50 INJECTION, SOLUTION INTRAMUSCULAR; INTRAVENOUS at 13:43

## 2020-09-23 RX ADMIN — FENTANYL CITRATE 250 MCG: 50 INJECTION, SOLUTION INTRAMUSCULAR; INTRAVENOUS at 12:38

## 2020-09-23 RX ADMIN — CEFAZOLIN 1 G: 1 INJECTION, POWDER, FOR SOLUTION INTRAMUSCULAR; INTRAVENOUS at 11:10

## 2020-09-23 RX ADMIN — NOREPINEPHRINE BITARTRATE 6.4 MCG: 1 INJECTION, SOLUTION, CONCENTRATE INTRAVENOUS at 10:06

## 2020-09-23 RX ADMIN — FENTANYL CITRATE 100 MCG: 50 INJECTION, SOLUTION INTRAMUSCULAR; INTRAVENOUS at 08:16

## 2020-09-23 RX ADMIN — FENTANYL CITRATE 25 MCG: 50 INJECTION, SOLUTION INTRAMUSCULAR; INTRAVENOUS at 07:06

## 2020-09-23 RX ADMIN — PROPOFOL 15 MCG/KG/MIN: 10 INJECTION, EMULSION INTRAVENOUS at 23:30

## 2020-09-23 RX ADMIN — FENTANYL CITRATE 200 MCG: 50 INJECTION, SOLUTION INTRAMUSCULAR; INTRAVENOUS at 08:45

## 2020-09-23 RX ADMIN — LIDOCAINE HYDROCHLORIDE 100 MG: 20 INJECTION, SOLUTION INFILTRATION; PERINEURAL at 08:23

## 2020-09-23 RX ADMIN — SODIUM CHLORIDE, POTASSIUM CHLORIDE, SODIUM LACTATE AND CALCIUM CHLORIDE: 600; 310; 30; 20 INJECTION, SOLUTION INTRAVENOUS at 07:54

## 2020-09-23 RX ADMIN — OXYCODONE HYDROCHLORIDE 5 MG: 5 TABLET ORAL at 19:29

## 2020-09-23 RX ADMIN — ROCURONIUM BROMIDE 20 MG: 10 INJECTION INTRAVENOUS at 12:37

## 2020-09-23 RX ADMIN — SODIUM BICARBONATE 50 MEQ: 84 INJECTION, SOLUTION INTRAVENOUS at 16:55

## 2020-09-23 RX ADMIN — HYDROMORPHONE HYDROCHLORIDE 1 MG: 1 INJECTION, SOLUTION INTRAMUSCULAR; INTRAVENOUS; SUBCUTANEOUS at 12:57

## 2020-09-23 RX ADMIN — CEFAZOLIN 1 G: 1 INJECTION, POWDER, FOR SOLUTION INTRAMUSCULAR; INTRAVENOUS at 13:08

## 2020-09-23 RX ADMIN — ROCURONIUM BROMIDE 30 MG: 10 INJECTION INTRAVENOUS at 10:52

## 2020-09-23 RX ADMIN — PANTOPRAZOLE SODIUM 40 MG: 40 TABLET, DELAYED RELEASE ORAL at 06:34

## 2020-09-23 RX ADMIN — SODIUM CHLORIDE 1.25 G/HR: 900 INJECTION, SOLUTION INTRAVENOUS at 09:55

## 2020-09-23 RX ADMIN — BUPIVACAINE HYDROCHLORIDE 20 ML: 2.5 INJECTION, SOLUTION EPIDURAL; INFILTRATION; INTRACAUDAL; PERINEURAL at 09:50

## 2020-09-23 RX ADMIN — PROPOFOL 30 MG: 10 INJECTION, EMULSION INTRAVENOUS at 09:44

## 2020-09-23 RX ADMIN — PROPOFOL 20 MG: 10 INJECTION, EMULSION INTRAVENOUS at 14:07

## 2020-09-23 RX ADMIN — ALBUMIN HUMAN 12.5 G: 0.05 INJECTION, SOLUTION INTRAVENOUS at 22:54

## 2020-09-23 RX ADMIN — SUGAMMADEX 200 MG: 100 INJECTION, SOLUTION INTRAVENOUS at 14:45

## 2020-09-23 RX ADMIN — NOREPINEPHRINE BITARTRATE 12.8 MCG: 1 INJECTION, SOLUTION, CONCENTRATE INTRAVENOUS at 10:28

## 2020-09-23 RX ADMIN — PROPOFOL 20 MCG/KG/MIN: 10 INJECTION, EMULSION INTRAVENOUS at 13:42

## 2020-09-23 RX ADMIN — HUMAN INSULIN 1.5 UNITS/HR: 100 INJECTION, SOLUTION SUBCUTANEOUS at 11:37

## 2020-09-23 RX ADMIN — PANTOPRAZOLE SODIUM 40 MG: 40 INJECTION, POWDER, FOR SOLUTION INTRAVENOUS at 15:47

## 2020-09-23 RX ADMIN — ACETAMINOPHEN 975 MG: 325 TABLET, FILM COATED ORAL at 15:47

## 2020-09-23 RX ADMIN — FENTANYL CITRATE 50 MCG: 50 INJECTION, SOLUTION INTRAMUSCULAR; INTRAVENOUS at 07:45

## 2020-09-23 RX ADMIN — HEPARIN SODIUM 30 ML: 1000 INJECTION INTRAVENOUS; SUBCUTANEOUS at 10:28

## 2020-09-23 RX ADMIN — ACETAMINOPHEN 975 MG: 325 TABLET, FILM COATED ORAL at 19:29

## 2020-09-23 RX ADMIN — VANCOMYCIN HYDROCHLORIDE 1500 MG: 10 INJECTION, POWDER, LYOPHILIZED, FOR SOLUTION INTRAVENOUS at 08:55

## 2020-09-23 RX ADMIN — PROTAMINE SULFATE 40 MG: 10 INJECTION, SOLUTION INTRAVENOUS at 13:30

## 2020-09-23 RX ADMIN — PROTAMINE SULFATE 130 MG: 10 INJECTION, SOLUTION INTRAVENOUS at 12:49

## 2020-09-23 ASSESSMENT — ACTIVITIES OF DAILY LIVING (ADL)
ADLS_ACUITY_SCORE: 16
ADLS_ACUITY_SCORE: 15

## 2020-09-23 ASSESSMENT — MIFFLIN-ST. JEOR: SCORE: 1175.37

## 2020-09-23 NOTE — PROGRESS NOTES
CLINICAL NUTRITION SERVICES - BRIEF NOTE    Received provider consult for nutrition education with comments post op cardiovascular surgery (automatic consult on post-op order set). S/p AVR on 9/23. Nutrition education not indicated.    RD will follow per LOS protocol or if re-consulted.     Lauren Gimenez, MS, RD, LD, [facility]  [id number]  Pgr: [phone]

## 2020-09-23 NOTE — ANESTHESIA CARE TRANSFER NOTE
Patient: Monika Bee    Procedure(s):  Redo sternotomy, lysis of adhesions, replacement of mechanical aortic valve with 19 mm St. Romeo Mechanical Heart Vave, on pump oxygenation.    Diagnosis: Aortic stenosis [I35.0]  Diagnosis Additional Information: No value filed.    Anesthesia Type:   General     Note:  Airway :ETT  Patient transferred to:ICU  Comments: Patient transported to ICU intubated, sedated, ventilated with 100%O2, fully monitored.  VSS during transport.  Patient left in stable condition in the care of ICU team.  Full report given. Neuromuscular blockade reversed in the ICU.    ICU Handoff: Call for PAUSE to initiate/utilize ICU HANDOFF, Identified Patient, Identified Responsible Provider, Reviewed the Pertinent Medical History, Discussed Surgical Course, Reviewed Intra-OP Anesthesia Management and Issues during Anesthesia, Set Expectations for Post Procedure Period and Allowed Opportunity for Questions and Acknowledgement of Understanding      Vitals: (Last set prior to Anesthesia Care Transfer)    CRNA VITALS  9/23/2020 1404 - 9/23/2020 1504      9/23/2020             Resp Rate (observed):  12                Electronically Signed By: Frankie Locke MD  September 23, 2020  3:04 PM

## 2020-09-23 NOTE — OP NOTE
OPERATIVE DATE: 9/23/2020    PRE-OPERATIVE DIAGNOSIS:  1) Prosthetic aortic valve, severe, symptomatic stenosis  2) History of bicuspid aortic valve with severe stenosis s/p replacement with St. Romeo mechanical prosthesis in 2004  3) History of patent foramen ovale s/p primary repair, 2004  4) Mechanical aortic valve pannus  5) Hypertension    POST-OPERATIVE DIAGNOSIS:  1) Prosthetic aortic valve, severe, symptomatic stenosis  2) History of bicuspid aortic valve with severe stenosis s/p replacement with St. Romeo mechanical prosthesis in 2004  3) History of patent foramen ovale s/p primary repair, 2004  4) Mechanical aortic valve pannus  5) Hypertension    PROCEDURE:  1) Redo median sternotomy  2) Redo aortic valve replacement, 19 mm St. Romeo [place] mechanical prosthesis    SURGEON: Jey Rainey MD    COSURGEON: Alfie Lopez MD    ANESTHESIA: GETA    ESTIMATED BLOOD LOSS: 500 mL    INDICATIONS:  Ms. REYNA LOMBARDO is a 84 year old female with severe, symptomatic prosthetic aortic valve stenosis from [facility].  We were asked to evaluate for redo aortic valve replacement.  Risks and benefits of the operation were explained to the patient and their family including, but not limited to, bleeding, infection, stroke and even death.  They understood these risks and agreed to proceed electively.    OPERATIVE REPORT:  The patient was transferred to the operating room and positioned supine on the OR table.  General anesthesia was monitored by the anesthesia team. The patients chest abdomen and bilateral lower extremities were clipped, prepped and draped in sterile fashion.  A pre-procedure time-out was performed confirming the correct patient, correct site and correct procedure.    No qualified fellow was available to assist for this case.  I acted as co-surgeon for Dr. Rainey.  Please refer to the complete dictated operative report for details of the case.      All needle, sponge and instrument counts were correct at the  end of the case.    I spent a total of 4.5 hours assisting for this case.    Alfie Lopez MD  Cardiothoracic Surgery  [phone]

## 2020-09-23 NOTE — PROGRESS NOTES
Admitted/transferred from: OR  Reason for admission/transfer: Post AVR  2 RN skin assessment: completed by JORDI Mcfadden and HAKEEM Cm  Result of skin assessment and interventions/actions: No abnormalities  Height, weight, drug calc weight: Done  Patient belongings (see Flowsheet)  MDRO education added to care planN/A  ?

## 2020-09-23 NOTE — ANESTHESIA POSTPROCEDURE EVALUATION
Anesthesia POST Procedure Evaluation    Patient: Monika Bee   MRN:     [id number] Gender:   female   Age:    84 year old :      1935        Preoperative Diagnosis: Aortic stenosis [I35.0]   Procedure(s):  Redo sternotomy, lysis of adhesions, replacement of mechanical aortic valve with 19 mm St. Romeo Mechanical Heart Vave, on pump oxygenation.   Postop Comments: No value filed.     Anesthesia Type: General       Disposition: ICU            ICU Sign Out: Anesthesiologist/ICU physician sign out WAS performed   Postop Pain Control: Uncomplicated            Sign Out: Comfortable, Well controlled pain   PONV: No PONV            Sign Out: No Nausea or Vomiting   Neuro/Psych: Uneventful perioperative course            Sign Out: PLANNED postop sedation   Airway/Respiratory: Uneventful perioperative course            Sign Out: AIRWAY IN SITU/Resp. Support               Airway in situ/Resp. Support: ETT                 Reason: Planned Pre-op   CV/Hemodynamics: Uneventful perioperative course            Sign Out: Appropriate BP and perfusion indices; Appropriate HR/Rhythm   Other NRE:    DID A NON-ROUTINE EVENT OCCUR? No         Last Anesthesia Record Vitals:  CRNA VITALS  2020 1404 - 2020 1504      2020             Resp Rate (observed):  12          Last PACU Vitals:  Vitals Value Taken Time   BP     Temp 35.9  C (96.6  F) 2020  3:00 PM   Pulse 69 2020  3:13 PM   Resp 18 2020  3:00 PM   SpO2 96 % 2020  3:13 PM   Temp src     NIBP     Pulse     SpO2     Resp     Temp     Ht Rate     Temp 2     Vitals shown include unvalidated device data.      Electronically Signed By: Kaiser Brewer MD, 2020, 3:14 PM

## 2020-09-23 NOTE — H&P
CV ICU H&P  9/23/2020      CO-MORBIDITIES:   Patient Active Problem List   Diagnosis     Aortic aneurysm (H)     Dyslipidemia     Early dry stage nonexudative age-related macular degeneration of both eyes     History of aortic valve replacement     Hypertension, benign essential, goal below 140/90     Long term current use of anticoagulant therapy     Osteoarthrosis involving lower leg     Palpitations     Vitamin D deficiency     Class 1 obesity in adult     Aortic stenosis     Coronary arteries, normal     Fracture of femur (H)     Primary osteoarthritis involving multiple joints     Pseudophakia, both eyes     Right posterior capsular opacification     Syncope     Chronic open angle glaucoma of both eyes, indeterminate stage       ASSESSMENT: Monika Bee is a 84 year old female with history of bicuspid aortic valve with severe aortic stenosis s/p replacement with St Romeo mechanical prosthesis and patent foramen ovale s/p repair (2004), HTN, and GERD who is admitted after redo sternotomy with aortic valve replacement with 19 mm St Romeo [place] mechanical prosthesis 09/23/20 with Dr. Rainey.     2 PRBC   300 Cell Saver   1500 Crystalloid     Arrives on 0.05 epinephrine   Post op echo with mild RV dysfunction        PLAN:  Neuro/ pain/ sedation:  # Acute post-operative pain  - Monitor neurological status. Notify the MD for any acute changes in exam.  - Fentanyl gtt for pain.  - Propofol gtt for sedation.  - Domenic tylenol   - PRN oxycodone   - ES catheters 09/23/20 by RAPs team     Pulmonary care:   - MV  - Titrate FiO2 for SpO2 >92%  - CXR, gas on arrival   - Potentially extubate later today     Cardiovascular:    # HLD   # HTN   # Bicuspid aortic valve with severe aortic stenosis s/p replacement with St Romeo mechanical prosthesis and patent foramen ovale s/p repair (2004)  # S/p redo sternotomy with aortic valve replacement with 19 mm St Romeo [place] mechanical prosthesis.   - Monitor hemodynamic status.   - MAP  goal > 65  - ASA 81  - PTA simvastatin 20 mg - hold   - PTA metoprolol 50 mg BID - hold   - PTA spiranolactone 25 mg daily - hold       GI /Nutrition:   - NPO except meds   - AXR   - Bowel regimen with senna BID, miralax daily  - Pantoprazole     Fluids/ Electrolytes/ Renal:   - Rainey for strict I&Os  - Daily labs     Endocrine:    # Stress hyperglycemia  - Insulin gtt    ID/ Antibiotics:  # Aortic valve   - Perioperative antibiotics with ancef and vancomycin 09/23/20     Heme:     # Acute blood loss anemia  - Hgb goal >7  - PTA warfarin - hold   - CBC and coags     Prophylaxis:    - SCDs  - Bowel regimen  - PPI  - Hold heparin     Lines/ tubes/ drains:  - R MAC line swan 09/23/20   - 2 Pleural CT (R and L, 28 Fr) 09/23/20   - 2 Med CT 32 Fr 09/23/20   - R brachial arterial line 09/23/20   - Rainey 09/23/20   - PIV   - ETT 09/23/20     Disposition:  - CV ICU.     Discussed with Dr. Kt Owens  Surgery Resident   *[id number]      ====================================    HPI:   Monika Bee is a 84 year old female with history of bicuspid aortic valve with severe aortic stenosis s/p replacement with St Romeo mechanical prosthesis and patent foramen ovale s/p repair (2004), HTN, and GERD who is admitted after redo sternotomy with aortic valve replacement with 19 mm St Romeo [place] mechanical prosthesis 09/23/20 with Dr. Rainey.     2 PRBC   300 Cell Saver   1500 Crystalloid     Arrives on 0.05 epinephrine   Post op echo with mild RV dysfunction        PAST MEDICAL HISTORY:   Past Medical History:   Diagnosis Date     Aortic aneurysm (H) 9/19/2012    Mild dilation of the ascending aorta measuring 3.8cm. Mild dilation of the ascending aorta measuring 3.8cm.     Aortic stenosis 8/19/2020    Added automatically from request for surgery [id number]     Aortic valve disorder 7/21/2020    A. S/p aortic valve replacement 2004 with 21mm St. Romeo B. Last echocardiogram 2006 demonstrating armida functioning valve -- mean  "gradient of 16mmHg. Normal LV size and function, no other significant valve disease. A. S/p aortic valve replacement 2004 with 21mm St. Romeo B. Last echocardiogram 2006 demonstrating armida functioning valve -- mean gradient of 16mmHg. Normal LV size and function, no oth     Dyslipidemia 12/4/2013     Early dry stage nonexudative age-related macular degeneration of both eyes 1/13/2020     Hypertension, benign essential, goal below 140/90 12/4/2013     Long term current use of anticoagulant therapy 7/16/2009     Osteoarthrosis involving lower leg 7/21/2020     Ostium secundum type atrial septal defect 9/19/2012    Closed during aortic valve replacement surgically in 2004 Closed during aortic valve replacement surgically in 2004     PFO (patent foramen ovale) 9/19/2012    Closed during aortic valve replacement surgically in 2004 Closed during aortic valve replacement surgically in 2004     Vitamin D deficiency 1/18/2013       PAST SURGICAL HISTORY:   Past Surgical History:   Procedure Laterality Date     AS TOTAL KNEE ARTHROPLASTY Right 2015     CARPAL TUNNEL RELEASE RT/LT       CATARACT IOL, RT/LT       FEMUR SURGERY Left     fracture repair     REPAIR VALVE AORTIC  2004       FAMILY HISTORY:   Family History   Problem Relation Age of Onset     Cardiovascular Father      Colon Cancer Father      Anesthesia Reaction No family hx of      Deep Vein Thrombosis (DVT) No family hx of        SOCIAL HISTORY:   Social History     Tobacco Use     Smoking status: Never Smoker     Smokeless tobacco: Never Used   Substance Use Topics     Alcohol use: Yes     Alcohol/week: 6.0 standard drinks     Types: 6 Glasses of wine per week     Frequency: 2-4 times a month     Drinks per session: 1 or 2     Binge frequency: Weekly         OBJECTIVE:   1. VITAL SIGNS:    BP 97/54   Pulse 61   Temp 96.6  F (35.9  C) (Pulmonary Artery)   Resp 18   Ht 1.511 m (4' 11.49\")   Wt 80.2 kg (176 lb 12.9 oz)   SpO2 95%   BMI 35.13 kg/m         2. " INTAKE/ OUTPUT:   I/O last 3 completed shifts:  In: 2060 [I.V.:1500; Other:260]  Out: 150 [Urine:150]      3. PHYSICAL EXAMINATION:   General: Intubated, sedated  Neuro: Non responsive   Resp: Equal chest rise, intubated, chest tubes with sanguinous output no airleaks  CV: RRR,   Abdomen: Soft, Non-distended  Incisions: c/d/i, incisional vac holding suction   Extremities: warm and well perfused    Labs and radiology to be reviewed with staff   =========================================

## 2020-09-23 NOTE — PHARMACY-VANCOMYCIN DOSING SERVICE
Pharmacy Vancomycin Initial Note  Date of Service 2020  Patient's  1935  84 year old, female    Indication: post-op prohylaxis x 24 hrs    Current estimated CrCl = Estimated Creatinine Clearance: 75.2 mL/min (based on SCr of 0.73 mg/dL).    Creatinine for last 3 days  2020: 12:59 PM Creatinine 0.93 mg/dL  2020:  2:56 PM Creatinine 0.73 mg/dL    Recent Vancomycin Level(s) for last 3 days  No results found for requested labs within last 72 hours.      Vancomycin IV Administrations (past 72 hours)                   vancomycin 1500 mg in 0.9% NaCl 250 ml intermittent infusion 1,500 mg (mg) 1,500 mg Given 20 0855                Nephrotoxins and other renal medications (From now, onward)    Start     Dose/Rate Route Frequency Ordered Stop    20 2100  vancomycin 1500 mg in 0.9% NaCl 250 ml intermittent infusion 1,500 mg      1,500 mg  over 90 Minutes Intravenous EVERY 12 HOURS 20 1521 20          Contrast Orders - past 72 hours (72h ago, onward)    None                Plan:  1.  Start vancomycin  1500 mg iv q12h x 24 hrs  2.  Goal Trough Level: 15-20 mg/L   3.  Pharmacy will check trough levels as appropriate in 1-3 Days.    4. Serum creatinine levels will be ordered daily for the first week of therapy and at least twice weekly for subsequent weeks.    5. Enterprise method utilized to dose vancomycin therapy: Method 2    Siddhartha FaulknerD

## 2020-09-23 NOTE — LETTER
Transition Communication Hand-off for Care Transitions to Next Level of Care Provider    Name: Monika Bee  : 1935  MRN #: [id number]  Primary Care Provider: FLORIN OSMAN     Primary Clinic: [facility] [address] [facility] [address]     Reason for Hospitalization:  Aortic stenosis [I35.0]  Admit Date/Time: 2020  5:21 AM  Discharge Date: 10/2/2020  Payor Source: Payor: BCBS / Plan: BCBS MEDICARE ADVANTAGE / Product Type: Medicare /   Care Management Discharge Note    Discharge Planning:  Expected Discharge Date: 10/02/20 at 1200  Concerns to be Addressed: None    Anticipated Discharge Disposition:    [facility],  [address] [place] [id number]  Ph: [phone]  **Please call above number for RN report**    Anticipated Discharge Services:  SNF  Anticipated Discharge DME:  None    Patient/family educated on Medicare website which has current facility and service quality ratings:  YES  Referrals Placed by CM/SW:  SNF  Education Provided on the Discharge Plan:  YES  Patient/Family in Agreement with the Plan:  YES  Disposition Comments:    PAS completed, ref#[id number]. Family will transport- they verbalize understanding about visitor restrictions (no TCU visitors allowed) and driving restrictions. IMM completed. CVTS PA completing discharge paperwork now. SW completed CTS handoff, as well.     GUDELIA updated CVTS, [facility] via page, nursing staff, patient and sister Pat.       Juany Gates MSW, [facility]  6C Cardiology Unit   [facility]  Phone: [phone]  Pager: [phone]

## 2020-09-23 NOTE — BRIEF OP NOTE
[facility], [place]    Brief Operative Note    Pre-operative diagnosis: Aortic stenosis [I35.0]  Post-operative diagnosis Same as pre-operative diagnosis    Procedure: Procedure(s):  Redo sternotomy, lysis of adhesions, replacement of mechanical aortic valve with 19 mm St. Romeo Mechanical Heart Vave, on pump oxygenation.  Surgeon: Surgeon(s) and Role:     * Jey Rainey MD - Primary     * Nabeel Lock PA-C - Assisting     * Aminata Owens MD - Assisting     * Alfie Lopez MD  Anesthesia: Combined General with Block   Estimated blood loss: 1L   Drains:   R and L 28 Fr Pleural   2x 32 Fr Mediastinal     Specimens:   ID Type Source Tests Collected by Time Destination   A : Explanted Mechanical Aortic Valve Other (specify in comments) Other SURGICAL PATHOLOGY EXAM Jey Rainey MD 9/23/2020 11:20 AM    B : Sub valve pannus Tissue Other SURGICAL PATHOLOGY EXAM Jey Rainey MD 9/23/2020 11:25 AM      Findings:   As expected   Complications: None.  Implants:   Implant Name Type Inv. Item Serial No.  Lot No. LRB No. Used Action   IMP KIT SUTURE COR-KNOT MINI [id number] Metallic Hardware/[place] IMP KIT SUTURE COR-KNOT MINI 4X14MM [id number]  [employer] [id number] N/A 1 Implanted   Aortic Mechanical Valve      N/A 1 Explanted   VALVE AORTIC REGENT FLEX-CUFF 19MM [id number] Valve VALVE AORTIC REGENT FLEX-CUFF 19MM [id number] [employer]  N/A 1 Implanted   DEVICE JAIN ENDO COR KNOT QUICK LOAD [id number] Wire DEVICE JAIN ENDO COR KNOT QUICK LOAD [id number]  [employer] [id number] N/A 1 Implanted   GRAFT PERICARDIUM 6X8CM BOVINE E6P8 Bone/Tissue/Biologic GRAFT PERICARDIUM 6X8CM BOVINE E6P8 [id number] [facility] VASCULAR IN [id number] N/A 1 Implanted

## 2020-09-23 NOTE — PLAN OF CARE
Major Shift Events:  Pt arrived to  from OR following redo sternotomy and AVR.  Pt intubated and sedated upon arrival.  With sedation lightened pt follows all simple commands, CARDOSO's.  ETT initially too deep and left lung not being ventilated, retracted tube 3 cm and left lung sounds now equal to right and pt oxygenation improved.  Vent CMV 12/450/8/40%.  Epi only vasoactive drip infusing, at 0.02 mcg/kg/min.  Propofol at 15 mcg/kg/min.  Fentanyl drip not started as pt denied pain.  OnQue catheters in place.  One AMP Bicarb given.  ABG within normal limits.    Plan: Anticipating keeping pt ventilated overnight then PS in morning.  For vital signs and complete assessments, please see documentation flowsheets.

## 2020-09-23 NOTE — ANESTHESIA PROCEDURE NOTES
WESLEY Probe Insertion Note:      Staff -   Anesthesiologist:  Kaiser Brewer MD  Resident/Fellow: Jordan Krishna DO  Performed By: fellow  Procedure performed by resident/CRNA in presence of a teaching physician.      Probe Status PRE Insertion: NO obvious damage  Probe type:  Adult 3D    Bite block used:   Yes  Insertion Technique: Easy, no oropharyngeal manipulation  Insertion complications: None obvious    Billing Report:WESLEY report by Anesthesiologist (See Separate Report note)    Probe Status POST Removal: NO obvious damage

## 2020-09-23 NOTE — PROGRESS NOTES
SPIRITUAL HEALTH SERVICES  [facility] ([place]) 3C   PRE-SURGERY VISIT    Had pre-surgery visit with pt.  Provided spiritual support, prayer. Pt identifies as Hinduism    Rev. Mariah Hubbard MDiv, [facility]  Staff    Pager [phone]

## 2020-09-23 NOTE — OR NURSING
Bilateral Erector spinae catheter placement preformed with 25 mcg fentanyl. Patient tolerated procedure well, is vitally stable, with no immediate complications. Will continue to mo nitor until transfer to OR.

## 2020-09-23 NOTE — ANESTHESIA PROCEDURE NOTES
Perioperative WESLEY Report  Anesthesia Information  WESLEY probe placed and report generated by: : Kaiser Brewer MD Schwartz, Matthew Stander, MD            General Procedure Information  Modalities:  2D, 3D, CW Doppler, PW Doppler and Color flow mapping  Diagnostic indications for WESLEY:         Aortic stenosis                    .    Echocardiographic and Doppler Measurements  Right Ventricle:  Cavity size normal.   Hypertrophy not present.   Thrombus not present.    Global function normal.     Left Ventricle:  Cavity size normal.   Hypertrophy not present.   Thrombus not present.   Global Function normal.       Ventricular Regional Function:  1- Basal Anteroseptal:  normal  2- Basal Anterior:  normal  3- Basal Anterolateral:  normal  4- Basal Inferolateral:  normal  5- Basal Inferior:  normal  6- Basal Inferoseptal:  normal  7- Mid Anteroseptal:  normal  8- Mid Anterior:  normal  9- Mid Anterolateral:  normal  10- Mid Inferolateral:  normal  11- Mid Inferior:  normal  12- Mid Inferoseptal:  normal  13- Apical Anterior:  normal  14- Apical Lateral:  normal  15- Apical Inferior:  normal  16- Apical Septal:  normal  17- Hayden:  normal    Valves  Aortic Valve: Stenosis severe.  Regurgitation +1.    Mitral Valve: Annulus dilated and calcified.  Stenosis mild.  Regurgitation +1.  Leaflets calcified.    Tricuspid Valve: Annulus normal.  Stenosis not present.  Regurgitation +1.  Leaflets normal.  Leaflet motions normal.    Pulmonic Valve: Annulus normal.  Stenosis not present.  Regurgitation absent.        Right Atrium:  Size normal.   Thrombus not present.   Tumor not present.   Device not present.     Left Atrium: Size dilated.  Spontaneous echo contrast present.  Thrombus not present.  Tumor not present.  Device not present.    Left atrial appendage normal.     Atrial Septum: Intra-atrial septal morphology normal.     Ventricular Septum: Intra-ventricular septum morphology normal.       Other Findings:    Pericardium:  normal.  Pleural Effusion:  none. Pulmonary Arteries:  normal.  Pulmonary Venous Flow:  blunted (decreased) systolic flow.  Cornoary sinus catheter present.  .  .  Post Intervention Findings  Procedure(s) performed:  Aortic Valve Repair/Replace. Global function:  Unchanged.   Regional wall motion:  Unchanged   Surgeon(s) notified of all postintervention findings:  Yes  .  .  .   Aortic Valve: Valve replaced with bioprosthetic valve.  No SAUL present.  No paravalvular leak.  .  .  .  .  .  .        Echocardiogram Comments

## 2020-09-23 NOTE — ANESTHESIA PROCEDURE NOTES
Arterial Line Procedure Note      Staff -   Anesthesiologist:  Kaiser Brewer MD  Resident/Fellow: Frankie Locke MD  Performed By: resident    Location: In OR Before Induction  Line Placement:     Procedure:  Arterial Line    Insertion Site:  Brachial    Insertion laterality:  Left    Skin Prep: Chloraprep      Patient Prep: patient draped, mask, sterile gloves, hat and hand hygiene      Local skin infiltration:  2% lidocaine    amount (mL):  3    Ultrasound Guided?: Yes      Artery evaluated via ultrasound confirming patency.   Using realtime imaging, the artery was punctured and the needle was observed entering the artery.      A permanent image is NOT entered into the patient's record.      Catheter size:  20 gauge, 12 cm    Cath secured with: other (comment)      Dressing:  Tegaderm    Complications:  None obvious    Arterial waveform: Yes      IBP within 10% of NIBP: Yes    Assessment/Narrative:      Assessed radial line first. Small calcified vessel.   Opted to perform brachial arterial line.   No complications.

## 2020-09-23 NOTE — ANESTHESIA PROCEDURE NOTES
PA Catheter Insertion Note        Staff -   Anesthesiologist:  Kaiser Brewer MD  Resident/Fellow: Frankie Locke MD  Performed By: resident  Procedure performed by resident/CRNA in presence of a teaching physician.      Introducer: Introducer placed as part of procedure (SEE separate note)   Skin prep:  Chloraprep Cap, Full body drape, hand hygiene, Mask, Sterile gloves and Sterile gown            Appropriate RA, RV, PA  waveforms?: Yes    Dressing:  Biopatch    Complications:  None apparent

## 2020-09-23 NOTE — ANESTHESIA PROCEDURE NOTES
Central Line Procedure Note      Staff -   Anesthesiologist:  Kaiser Brewer MD  Resident/Fellow: Frankie Locke MD  Performed By: resident  Procedure performed by resident/CRNA in presence of a teaching physician.    Location: In OR after induction  Procedure Start/Stop Times:     patient identified, IV checked, site marked, risks and benefits discussed, informed consent, monitors and equipment checked, pre-op evaluation and at physician/surgeon's request      Correct Patient: Yes      Correct Position: Yes      Correct Site: Yes      Correct Procedure: Yes      Correct Laterality:  N/A    Site Marked:  Yes  Line Placement:     Procedure:  Central Line    Insertion laterality:  Right    Insertion site:  Internal Jugular    Position:  Trendelenburg    Sterility preparation included the following: hand hygiene performed prior to central venous catheter insertion, maximum sterile barriers were used: cap, mask, sterile gown, sterile gloves, and large sterile sheet, antiseptic used during central venous catheter insertion and skin prep agent completely dried prior to procedure         Injection Technique:  Ultrasound guided    Sterile Ultrasound Technique:  Sterile probe cover and Sterile gel    Vein evaluated via U/S for patency/adequacy of catheter insertion and is adequate.  Using realtime U/S imaging the vein was punctured, and needle was observed entering vein on U/S      A permanent image is NOT entered into the patient's record.      Local skin infiltration:  None    Catheter size:  9 Fr, 2 lumen 11.5 cm (MAC)    Catheter length at skin (cm):  15    Cath secured with: suture      Dressing:  Tegaderm and Biopatch    Complications:  None obvious    Blood aspirated all lumens: Yes      All Lumens Flushed: Yes      Tip termination: right atrium      Verification method:  Placement to be verified post-op  Assessment/Narrative:      MAC line {

## 2020-09-24 ENCOUNTER — APPOINTMENT (OUTPATIENT)
Dept: PHYSICAL THERAPY | Facility: CLINIC | Age: 85
DRG: 228 | End: 2020-09-24
Attending: SURGERY
Payer: COMMERCIAL

## 2020-09-24 ENCOUNTER — APPOINTMENT (OUTPATIENT)
Dept: GENERAL RADIOLOGY | Facility: CLINIC | Age: 85
DRG: 228 | End: 2020-09-24
Attending: SURGERY
Payer: COMMERCIAL

## 2020-09-24 LAB
ALBUMIN SERPL-MCNC: 3.4 G/DL (ref 3.4–5)
ALP SERPL-CCNC: 50 U/L (ref 40–150)
ALT SERPL W P-5'-P-CCNC: 15 U/L (ref 0–50)
ANION GAP SERPL CALCULATED.3IONS-SCNC: 8 MMOL/L (ref 3–14)
AST SERPL W P-5'-P-CCNC: 41 U/L (ref 0–45)
BASE DEFICIT BLDA-SCNC: 1.1 MMOL/L
BASE DEFICIT BLDA-SCNC: 3.8 MMOL/L
BASE DEFICIT BLDV-SCNC: 0.3 MMOL/L
BASE DEFICIT BLDV-SCNC: 1.1 MMOL/L
BASE DEFICIT BLDV-SCNC: 3.1 MMOL/L
BILIRUB SERPL-MCNC: 1.1 MG/DL (ref 0.2–1.3)
BUN SERPL-MCNC: 17 MG/DL (ref 7–30)
CA-I BLD-MCNC: 4.7 MG/DL (ref 4.4–5.2)
CALCIUM SERPL-MCNC: 8.3 MG/DL (ref 8.5–10.1)
CHLORIDE SERPL-SCNC: 113 MMOL/L (ref 94–109)
CO2 SERPL-SCNC: 23 MMOL/L (ref 20–32)
CREAT SERPL-MCNC: 0.99 MG/DL (ref 0.52–1.04)
ERYTHROCYTE [DISTWIDTH] IN BLOOD BY AUTOMATED COUNT: 15.7 % (ref 10–15)
GFR SERPL CREATININE-BSD FRML MDRD: 53 ML/MIN/{1.73_M2}
GLUCOSE BLDC GLUCOMTR-MCNC: 107 MG/DL (ref 70–99)
GLUCOSE BLDC GLUCOMTR-MCNC: 120 MG/DL (ref 70–99)
GLUCOSE BLDC GLUCOMTR-MCNC: 128 MG/DL (ref 70–99)
GLUCOSE BLDC GLUCOMTR-MCNC: 140 MG/DL (ref 70–99)
GLUCOSE BLDC GLUCOMTR-MCNC: 148 MG/DL (ref 70–99)
GLUCOSE BLDC GLUCOMTR-MCNC: 159 MG/DL (ref 70–99)
GLUCOSE BLDC GLUCOMTR-MCNC: 166 MG/DL (ref 70–99)
GLUCOSE BLDC GLUCOMTR-MCNC: 86 MG/DL (ref 70–99)
GLUCOSE BLDC GLUCOMTR-MCNC: 87 MG/DL (ref 70–99)
GLUCOSE BLDC GLUCOMTR-MCNC: 90 MG/DL (ref 70–99)
GLUCOSE SERPL-MCNC: 140 MG/DL (ref 70–99)
HCO3 BLD-SCNC: 21 MMOL/L (ref 21–28)
HCO3 BLD-SCNC: 24 MMOL/L (ref 21–28)
HCO3 BLDV-SCNC: 22 MMOL/L (ref 21–28)
HCO3 BLDV-SCNC: 25 MMOL/L (ref 21–28)
HCO3 BLDV-SCNC: 26 MMOL/L (ref 21–28)
HCT VFR BLD AUTO: 30.2 % (ref 35–47)
HGB BLD-MCNC: 9.8 G/DL (ref 11.7–15.7)
LACTATE BLD-SCNC: 0.8 MMOL/L (ref 0.7–2)
MAGNESIUM SERPL-MCNC: 2.5 MG/DL (ref 1.6–2.3)
MCH RBC QN AUTO: 32.3 PG (ref 26.5–33)
MCHC RBC AUTO-ENTMCNC: 32.5 G/DL (ref 31.5–36.5)
MCV RBC AUTO: 100 FL (ref 78–100)
O2/TOTAL GAS SETTING VFR VENT: 40 %
O2/TOTAL GAS SETTING VFR VENT: ABNORMAL %
O2/TOTAL GAS SETTING VFR VENT: NORMAL %
OXYHGB MFR BLD: 95 % (ref 92–100)
OXYHGB MFR BLD: 96 % (ref 92–100)
OXYHGB MFR BLDV: 67 %
OXYHGB MFR BLDV: 69 %
OXYHGB MFR BLDV: 71 %
PCO2 BLD: 36 MM HG (ref 35–45)
PCO2 BLD: 41 MM HG (ref 35–45)
PCO2 BLDV: 40 MM HG (ref 40–50)
PCO2 BLDV: 49 MM HG (ref 40–50)
PCO2 BLDV: 51 MM HG (ref 40–50)
PH BLD: 7.38 PH (ref 7.35–7.45)
PH BLD: 7.38 PH (ref 7.35–7.45)
PH BLDV: 7.32 PH (ref 7.32–7.43)
PH BLDV: 7.32 PH (ref 7.32–7.43)
PH BLDV: 7.35 PH (ref 7.32–7.43)
PHOSPHATE SERPL-MCNC: 4.2 MG/DL (ref 2.5–4.5)
PLATELET # BLD AUTO: 187 10E9/L (ref 150–450)
PO2 BLD: 84 MM HG (ref 80–105)
PO2 BLD: 88 MM HG (ref 80–105)
PO2 BLDV: 38 MM HG (ref 25–47)
PO2 BLDV: 40 MM HG (ref 25–47)
PO2 BLDV: 40 MM HG (ref 25–47)
POTASSIUM BLD-SCNC: 3.9 MMOL/L (ref 3.4–5.3)
POTASSIUM BLD-SCNC: 4.4 MMOL/L (ref 3.4–5.3)
POTASSIUM SERPL-SCNC: 2.6 MMOL/L (ref 3.4–5.3)
POTASSIUM SERPL-SCNC: 3.6 MMOL/L (ref 3.4–5.3)
PROT SERPL-MCNC: 5.6 G/DL (ref 6.8–8.8)
RBC # BLD AUTO: 3.03 10E12/L (ref 3.8–5.2)
SODIUM SERPL-SCNC: 145 MMOL/L (ref 133–144)
WBC # BLD AUTO: 14.3 10E9/L (ref 4–11)

## 2020-09-24 PROCEDURE — 00000146 ZZHCL STATISTIC GLUCOSE BY METER IP

## 2020-09-24 PROCEDURE — 94002 VENT MGMT INPAT INIT DAY: CPT

## 2020-09-24 PROCEDURE — P9041 ALBUMIN (HUMAN),5%, 50ML: HCPCS

## 2020-09-24 PROCEDURE — 85027 COMPLETE CBC AUTOMATED: CPT | Performed by: STUDENT IN AN ORGANIZED HEALTH CARE EDUCATION/TRAINING PROGRAM

## 2020-09-24 PROCEDURE — 25000128 H RX IP 250 OP 636: Performed by: NURSE PRACTITIONER

## 2020-09-24 PROCEDURE — 25000125 ZZHC RX 250: Performed by: STUDENT IN AN ORGANIZED HEALTH CARE EDUCATION/TRAINING PROGRAM

## 2020-09-24 PROCEDURE — 82805 BLOOD GASES W/O2 SATURATION: CPT | Performed by: STUDENT IN AN ORGANIZED HEALTH CARE EDUCATION/TRAINING PROGRAM

## 2020-09-24 PROCEDURE — 40000014 ZZH STATISTIC ARTERIAL MONITORING DAILY

## 2020-09-24 PROCEDURE — 84132 ASSAY OF SERUM POTASSIUM: CPT | Performed by: SURGERY

## 2020-09-24 PROCEDURE — 71045 X-RAY EXAM CHEST 1 VIEW: CPT

## 2020-09-24 PROCEDURE — 25000128 H RX IP 250 OP 636: Performed by: STUDENT IN AN ORGANIZED HEALTH CARE EDUCATION/TRAINING PROGRAM

## 2020-09-24 PROCEDURE — 97162 PT EVAL MOD COMPLEX 30 MIN: CPT | Mod: GP

## 2020-09-24 PROCEDURE — 25800030 ZZH RX IP 258 OP 636: Performed by: STUDENT IN AN ORGANIZED HEALTH CARE EDUCATION/TRAINING PROGRAM

## 2020-09-24 PROCEDURE — 83605 ASSAY OF LACTIC ACID: CPT | Performed by: STUDENT IN AN ORGANIZED HEALTH CARE EDUCATION/TRAINING PROGRAM

## 2020-09-24 PROCEDURE — 25000128 H RX IP 250 OP 636: Performed by: SURGERY

## 2020-09-24 PROCEDURE — C9113 INJ PANTOPRAZOLE SODIUM, VIA: HCPCS | Performed by: STUDENT IN AN ORGANIZED HEALTH CARE EDUCATION/TRAINING PROGRAM

## 2020-09-24 PROCEDURE — 84132 ASSAY OF SERUM POTASSIUM: CPT | Performed by: STUDENT IN AN ORGANIZED HEALTH CARE EDUCATION/TRAINING PROGRAM

## 2020-09-24 PROCEDURE — 93005 ELECTROCARDIOGRAM TRACING: CPT

## 2020-09-24 PROCEDURE — 25800030 ZZH RX IP 258 OP 636: Performed by: SURGERY

## 2020-09-24 PROCEDURE — 25000132 ZZH RX MED GY IP 250 OP 250 PS 637: Performed by: STUDENT IN AN ORGANIZED HEALTH CARE EDUCATION/TRAINING PROGRAM

## 2020-09-24 PROCEDURE — 40000275 ZZH STATISTIC RCP TIME EA 10 MIN

## 2020-09-24 PROCEDURE — 97530 THERAPEUTIC ACTIVITIES: CPT | Mod: GP

## 2020-09-24 PROCEDURE — 82330 ASSAY OF CALCIUM: CPT | Performed by: STUDENT IN AN ORGANIZED HEALTH CARE EDUCATION/TRAINING PROGRAM

## 2020-09-24 PROCEDURE — 25000128 H RX IP 250 OP 636

## 2020-09-24 PROCEDURE — 20000004 ZZH R&B ICU UMMC

## 2020-09-24 PROCEDURE — 83735 ASSAY OF MAGNESIUM: CPT | Performed by: STUDENT IN AN ORGANIZED HEALTH CARE EDUCATION/TRAINING PROGRAM

## 2020-09-24 PROCEDURE — 80053 COMPREHEN METABOLIC PANEL: CPT | Performed by: STUDENT IN AN ORGANIZED HEALTH CARE EDUCATION/TRAINING PROGRAM

## 2020-09-24 PROCEDURE — 84100 ASSAY OF PHOSPHORUS: CPT | Performed by: STUDENT IN AN ORGANIZED HEALTH CARE EDUCATION/TRAINING PROGRAM

## 2020-09-24 RX ORDER — HEPARIN SODIUM 5000 [USP'U]/.5ML
5000 INJECTION, SOLUTION INTRAVENOUS; SUBCUTANEOUS EVERY 8 HOURS SCHEDULED
Status: DISCONTINUED | OUTPATIENT
Start: 2020-09-24 | End: 2020-09-30

## 2020-09-24 RX ORDER — FUROSEMIDE 10 MG/ML
20 INJECTION INTRAMUSCULAR; INTRAVENOUS ONCE
Status: COMPLETED | OUTPATIENT
Start: 2020-09-24 | End: 2020-09-24

## 2020-09-24 RX ORDER — ALBUMIN, HUMAN INJ 5% 5 %
SOLUTION INTRAVENOUS
Status: COMPLETED
Start: 2020-09-24 | End: 2020-09-24

## 2020-09-24 RX ORDER — DORZOLAMIDE HYDROCHLORIDE AND TIMOLOL MALEATE 20; 5 MG/ML; MG/ML
1 SOLUTION/ DROPS OPHTHALMIC 2 TIMES DAILY
Status: DISCONTINUED | OUTPATIENT
Start: 2020-09-24 | End: 2020-10-02 | Stop reason: HOSPADM

## 2020-09-24 RX ORDER — BUPIVACAINE HYDROCHLORIDE 2.5 MG/ML
5 INJECTION, SOLUTION EPIDURAL; INFILTRATION; INTRACAUDAL ONCE
Status: COMPLETED | OUTPATIENT
Start: 2020-09-24 | End: 2020-09-24

## 2020-09-24 RX ORDER — ALBUMIN, HUMAN INJ 5% 5 %
250 SOLUTION INTRAVENOUS ONCE
Status: COMPLETED | OUTPATIENT
Start: 2020-09-24 | End: 2020-09-24

## 2020-09-24 RX ORDER — CARBOXYMETHYLCELLULOSE SODIUM 5 MG/ML
1 SOLUTION/ DROPS OPHTHALMIC 3 TIMES DAILY PRN
Status: DISCONTINUED | OUTPATIENT
Start: 2020-09-24 | End: 2020-10-02 | Stop reason: HOSPADM

## 2020-09-24 RX ORDER — FUROSEMIDE 10 MG/ML
20 INJECTION INTRAMUSCULAR; INTRAVENOUS ONCE
Status: DISCONTINUED | OUTPATIENT
Start: 2020-09-24 | End: 2020-09-24

## 2020-09-24 RX ORDER — LATANOPROST 50 UG/ML
1 SOLUTION/ DROPS OPHTHALMIC AT BEDTIME
Status: DISCONTINUED | OUTPATIENT
Start: 2020-09-24 | End: 2020-10-02 | Stop reason: HOSPADM

## 2020-09-24 RX ORDER — PANTOPRAZOLE SODIUM 40 MG/1
40 TABLET, DELAYED RELEASE ORAL
Status: DISCONTINUED | OUTPATIENT
Start: 2020-09-25 | End: 2020-09-24

## 2020-09-24 RX ORDER — OXYCODONE HYDROCHLORIDE 5 MG/1
5-10 TABLET ORAL EVERY 4 HOURS PRN
Status: DISCONTINUED | OUTPATIENT
Start: 2020-09-24 | End: 2020-10-02 | Stop reason: HOSPADM

## 2020-09-24 RX ADMIN — MUPIROCIN 0.5 G: 20 OINTMENT TOPICAL at 20:28

## 2020-09-24 RX ADMIN — DORZOLAMIDE HYDROCHLORIDE AND TIMOLOL MALEATE 1 DROP: 20; 5 SOLUTION/ DROPS OPHTHALMIC at 19:21

## 2020-09-24 RX ADMIN — EPINEPHRINE 0.04 MCG/KG/MIN: 1 INJECTION PARENTERAL at 04:57

## 2020-09-24 RX ADMIN — VANCOMYCIN HYDROCHLORIDE 1500 MG: 10 INJECTION, POWDER, LYOPHILIZED, FOR SOLUTION INTRAVENOUS at 09:09

## 2020-09-24 RX ADMIN — MUPIROCIN 0.5 G: 20 OINTMENT TOPICAL at 10:09

## 2020-09-24 RX ADMIN — ALBUMIN HUMAN 250 ML: 0.05 INJECTION, SOLUTION INTRAVENOUS at 20:27

## 2020-09-24 RX ADMIN — FUROSEMIDE 20 MG: 10 INJECTION, SOLUTION INTRAVENOUS at 23:12

## 2020-09-24 RX ADMIN — POTASSIUM CHLORIDE 20 MEQ: 29.8 INJECTION, SOLUTION INTRAVENOUS at 14:40

## 2020-09-24 RX ADMIN — BUPIVACAINE HYDROCHLORIDE 12.5 MG: 2.5 INJECTION, SOLUTION EPIDURAL; INFILTRATION; INTRACAUDAL at 09:01

## 2020-09-24 RX ADMIN — CEFAZOLIN SODIUM 2 G: 2 INJECTION, SOLUTION INTRAVENOUS at 14:15

## 2020-09-24 RX ADMIN — FUROSEMIDE 20 MG: 10 INJECTION, SOLUTION INTRAVENOUS at 09:40

## 2020-09-24 RX ADMIN — PANTOPRAZOLE SODIUM 40 MG: 40 INJECTION, POWDER, FOR SOLUTION INTRAVENOUS at 09:09

## 2020-09-24 RX ADMIN — HYDROMORPHONE HYDROCHLORIDE 0.5 MG: 1 INJECTION, SOLUTION INTRAMUSCULAR; INTRAVENOUS; SUBCUTANEOUS at 07:22

## 2020-09-24 RX ADMIN — HEPARIN SODIUM 5000 UNITS: 5000 INJECTION, SOLUTION INTRAVENOUS; SUBCUTANEOUS at 13:14

## 2020-09-24 RX ADMIN — HEPARIN SODIUM 5000 UNITS: 5000 INJECTION, SOLUTION INTRAVENOUS; SUBCUTANEOUS at 22:13

## 2020-09-24 RX ADMIN — HYDROMORPHONE HYDROCHLORIDE 0.3 MG: 1 INJECTION, SOLUTION INTRAMUSCULAR; INTRAVENOUS; SUBCUTANEOUS at 19:15

## 2020-09-24 RX ADMIN — DORZOLAMIDE HYDROCHLORIDE AND TIMOLOL MALEATE 1 DROP: 20; 5 SOLUTION/ DROPS OPHTHALMIC at 10:10

## 2020-09-24 RX ADMIN — CEFAZOLIN SODIUM 2 G: 2 INJECTION, SOLUTION INTRAVENOUS at 06:46

## 2020-09-24 RX ADMIN — LATANOPROST 1 DROP: 50 SOLUTION OPHTHALMIC at 22:18

## 2020-09-24 RX ADMIN — ALBUMIN HUMAN 250 ML: 50 SOLUTION INTRAVENOUS at 20:27

## 2020-09-24 RX ADMIN — HYDROMORPHONE HYDROCHLORIDE 0.5 MG: 1 INJECTION, SOLUTION INTRAMUSCULAR; INTRAVENOUS; SUBCUTANEOUS at 22:13

## 2020-09-24 RX ADMIN — HYDROMORPHONE HYDROCHLORIDE 0.3 MG: 1 INJECTION, SOLUTION INTRAMUSCULAR; INTRAVENOUS; SUBCUTANEOUS at 03:16

## 2020-09-24 RX ADMIN — HEPARIN SODIUM 5000 UNITS: 5000 INJECTION, SOLUTION INTRAVENOUS; SUBCUTANEOUS at 11:01

## 2020-09-24 RX ADMIN — POTASSIUM CHLORIDE 20 MEQ: 29.8 INJECTION, SOLUTION INTRAVENOUS at 05:16

## 2020-09-24 RX ADMIN — SODIUM CHLORIDE, POTASSIUM CHLORIDE, SODIUM LACTATE AND CALCIUM CHLORIDE 500 ML: 600; 310; 30; 20 INJECTION, SOLUTION INTRAVENOUS at 04:39

## 2020-09-24 ASSESSMENT — MIFFLIN-ST. JEOR: SCORE: 1233.37

## 2020-09-24 ASSESSMENT — ACTIVITIES OF DAILY LIVING (ADL)
ADLS_ACUITY_SCORE: 16
ADLS_ACUITY_SCORE: 14
ADLS_ACUITY_SCORE: 14
ADLS_ACUITY_SCORE: 16
ADLS_ACUITY_SCORE: 14
ADLS_ACUITY_SCORE: 16

## 2020-09-24 NOTE — PROGRESS NOTES
CV ICU PROGRESS NOTE  September 24, 2020   Monika Bee  [id number]  Admitted: 9/23/2020  5:21 AM      CO-MORBIDITIES:   Aortic stenosis  Aortic stenosis  History of aortic valve replacement    ASSESSMENT: Monika Bee is a 84 year old female with history of bicuspid aortic valve with severe aortic stenosis s/p replacement with bioprosthesis and patent foramen ovale s/p repair (2004), HTN, and GERD developed aortic valve restenosis,  s/p redo sternotomy with aortic valve replacement with 19 mm St Romeo [place] mechanical prosthesis on 09/23/20 with Dr. Rainey. Post op echo with mild RV dysfunction . Extubated on 9/24. Postop course uneventful.     TODAY'S PROGRESS:   - Extubate   - Subcutaneous heparin   -  start warfarin for mechanical valve once stable (no bridging with heparin gtt per )   - Bedside swallow eval   - PT/OT   - 20 lasix   - Consider removal of goodwin later this afternoon, after diuresis   - PTA eye drops       PLAN:   Neuro/ pain/ sedation:  - Erector spinae catheter, jacquie tylenol, oxycodone PRN     Pulmonary care:   - Extubated AM 9/24. On 2L NC.  - PT, mobilize      Cardiovascular:    - Post op echo with mild RV dysfunction   - On Epi, wean pressors as able   - Pacer depended for 1 hr postop, V wires. In SR   - Capped pacer wires      GI care/ Nutrition:   - Bedside swallow, if passed- advance diet as tolerated   - Bowel regimen  - Protonix      Renal/ Fluids/ Electrolytes:   - Electrolyte replacement protocol  - Will continue to monitor intake and output.  - 20 lasix   - Consider removal of goodwin later this afternoon, after diuresis      Endocrine:    #Perioperative hyperglycemia  -insulin gtt. Switch to sliding scale after 24hrs of insulin gtt/ before shifting to floor      ID/ Antibiotics:  #Intraop prophylactic antibiotics: cefepime  48hrs+ vanc 24hrs (9/23/20)   -No other indication for antibiotics.      Heme:     -Hemoglobin to trend. Maintain Hgb > 7.0  No concerning CT  drainage  - subcutaneous heparin  -  start warfarin for mechanical valve once stable (no bridging with heparin gtt per )      Prophylaxis:    -Mechanical prophylaxis for DVT.   -SubQ heparin   -Bowel regimen     Lines/ tubes/ drains:  -R.IJ central cath, PA cath, A.Line, PIVx2, goodwin    Disposition: CV ICU    Patient seen, findings and plan discussed with CV ICU staff  .    Daniele Rainey MD   September 24, 2020   CV ICU resident  [place] [id number]     [facility]   Surgery Resident   [id number]      ====================================    TODAY'S PROGRESS:   SUBJECTIVE:   - Pt lying comfortably after extubation, on 2 L NC. Reports sore throat. Does not report incisional pain. Able to converse well     OBJECTIVE:   1. VITAL SIGNS:   Temp:  [96.6  F (35.9  C)-100.2  F (37.9  C)] 100.2  F (37.9  C)  Pulse:  [61-91] 91  Resp:  [12-18] 15  BP: (116)/(51) 116/51  MAP:  [63 mmHg-85 mmHg] 70 mmHg  Arterial Line BP: ()/(40-67) 107/45  FiO2 (%):  [40 %-60 %] 40 %  SpO2:  [94 %-100 %] 97 %  Ventilation Mode: (S) CPAP/PS  (Continuous positive airway pressure with Pressure Support)  FiO2 (%): 40 %  Rate Set (breaths/minute): 14 breaths/min  Tidal Volume Set (mL): 450 mL  PEEP (cm H2O): 5 cmH2O  Pressure Support (cm H2O): (S) 10 cmH2O  Oxygen Concentration (%): 40 %  Resp: 15      2. INTAKE/ OUTPUT:   I/O last 3 completed shifts:  In: 4660.27 [I.V.:2570.27; Other:260; NG/GT:280; IV Piggyback:500]  Out: 1010 [Urine:630; Chest Tube:380]    3. PHYSICAL EXAMINATION:   General:  Appears comfortable   CV: SR, not paced   Resp: 2L O2, Non laboured breathing, CT dressing intact, dry    Abd: Soft, not distended, no rigidity  Ext: Warm and perfused   Skin: No issues at present     4. INVESTIGATIONS:   Arterial Blood Gases   Recent Labs   Lab 09/24/20  0643 09/24/20  0401 09/23/20  2221 09/23/20  1718   PH 7.38 7.38 7.42 7.39   PCO2 36 41 32* 40   PO2 88 84 101 108*   HCO3 21 24 21 24     Complete Blood Count   Recent Labs    Lab 09/24/20  0401 09/23/20  2221 09/23/20  1456 09/23/20  1338  09/23/20  1300  09/21/20  1259   WBC 14.3*  --  17.7*  --   --   --   --  7.9   HGB 9.8* 10.4* 11.8 10.5*   < >  --    < > 11.5*     --  212  --   --  175  --  311    < > = values in this interval not displayed.     Basic Metabolic Panel  Recent Labs   Lab 09/24/20  0737 09/24/20  0401 09/23/20  1456 09/23/20  1338 09/23/20  1304  09/21/20  1259   NA  --  145* 142 141 142   < > 138   POTASSIUM 3.9 3.6 4.0 4.3 4.0   < > 4.1   CHLORIDE  --  113* 113* 112* 109   < > 106   CO2  --  23 24  --   --   --  26   BUN  --  17 13  --   --   --  19   CR  --  0.99 0.73  --   --   --  0.93   GLC  --  140* 158* 164* 170*   < > 100*    < > = values in this interval not displayed.     Liver Function Tests  Recent Labs   Lab 09/24/20 0401 09/23/20  1456 09/23/20  1300 09/23/20  0629 09/21/20  1259   AST 41 Canceled, Test credited  --   --  41   ALT 15 23  --   --  22   ALKPHOS 50 54  --   --  74   BILITOTAL 1.1 1.2  --   --  0.8   ALBUMIN 3.4 2.5*  --   --  3.7   INR  --  1.38* 1.59* 1.12 1.54*     Pancreatic Enzymes  No lab results found in last 7 days.  Coagulation Profile  Recent Labs   Lab 09/23/20 1456 09/23/20  1300 09/23/20  0629 09/21/20  1259   INR 1.38* 1.59* 1.12 1.54*   PTT 32 36  --  33     Lactate  Invalid input(s): LACTATE    5. RADIOLOGY:   Recent Results (from the past 24 hour(s))   XR Abdomen Port 1 View    Narrative    Exam: XR ABDOMEN PORT 1 VW, 9/23/2020 3:15 PM    Indication: assess OGT    Comparison: Chest x-ray same day    Findings:   Supine abdominal radiograph. Orogastric tube with side-port and tip  projecting over the stomach. Postsurgical changes from aortic valve  replacement. Median sternotomy wires, bilateral pleural tubes, and  epicardial pacers in place. Pulmonary artery catheter tip projects  over the right pulmonary artery.    There is a paucity of bowel gas. No pneumatosis or portal venous gas.  No abnormal calcifications.  No acute osseous or soft tissue  abnormalities. Airspace opacification of the left hemithorax, with the  right lung relatively clear.      Impression    Impression:   1. Orogastric tube tip and side port projects over the stomach.  2. Postsurgical changes of aortic valve replacement.  Supporting lines  and tubes as above.  3. Non obstructive bowel gas pattern.    I have personally reviewed the examination and initial interpretation  and I agree with the findings.    BALAJI CHANG MD   XR Chest Port 1 View   Result Value    Radiologist flags Collapsed lung (Urgent)    Narrative    EXAM: XR CHEST PORT 1 VW  9/23/2020 3:18 PM     HISTORY:  s/p aortic valve replacement       COMPARISON:  Chest x-ray 9/21/2020    FINDINGS:   Portable semiupright view of the chest. Endotracheal tube tip  approaches the ashwini and appears to enter the proximal right mainstem  bronchus. Mediastinal drain and bilateral chest tubes in place. Intact  medial sternotomy wires. Loop recorder projects over the left chest  wall. Pulmonary [place] catheter tip projects in the main pulmonary  artery. Postsurgical changes of aortic valve repair. Aortic  atherosclerosis. There is complete opacification of the left lung  field with shifting of mediastinal structures to the left and upwards.  No focal airspace opacity in the right lung field. No significant  pleural effusion. No right pneumothorax.      Impression    IMPRESSION:   Endotracheal tube tip appears to enter the right mainstem bronchus  with complete opacification of the left hemithorax with slight  shifting of the mediastinal structures to the left, favoring total  collapsed left lung. Recommend slight retraction of the endotracheal  tube and getting follow up Chest Xray.    [Urgent Result: Collapsed lung]    Finding was identified on 9/23/2020 3:28 PM.     Dr. Owens was contacted by Dr. Tellez at 9/23/2020 3:29 PM and  verbalized understanding of the urgent finding.      I have  personally reviewed the examination and initial interpretation  and I agree with the findings.    JOSE ROACH MD   XR Chest Port 1 View    Narrative    EXAM: XR CHEST PORT 1 VW  9/23/2020 4:16 PM     HISTORY:  Reposition ETT       COMPARISON:  Chest x-ray from same date at 1503 hours    FINDINGS:   Portable supine view of the chest. Slight interval retraction of the  endotracheal tube with improved aeration of the left lung field.  Persistent retrocardiac and left basilar airspace opacities. Aortic  valve prosthesis noted. Aortic atherosclerosis. Stable position of  mediastinal drain and bilateral chest tubes. Right internal jugular  [place]-Nahomi catheter tip projects in the main pulmonary artery. Enteric  tube courses below the diaphragm with the tip inferior to the field of  view. Cardiac silhouette is within normal limits. No significant  pleural effusion. No pneumothorax.      Impression    IMPRESSION:   1. Interval retraction of the endotracheal tube with partial  reexpansion of the left lung. Persistent retrocardiac and left basilar  atelectasis.  2. Remaining support devices are stable.    I have personally reviewed the examination and initial interpretation  and I agree with the findings.    JOSE ROACH MD       =========================================

## 2020-09-24 NOTE — PLAN OF CARE
4E - PT evaluation completed and treatment initiated.     Discharge Planner PT   Patient plan for discharge: not discussed in detail, eventually wants to go home  Current status: Pt performed supine > Sit with mod A, sit <> Stand with min A, transfers to chair with mod A, Additional assist needed for lines/tubes. Pt likely will require assist to scoot hips back on bed as unable when attempted due to height. Recommend up in chair 2-3x/day.  Barriers to return to prior living situation: impaired functional mobility, lives alone, precautions, medical status  Recommendations for discharge: ARU  Rationale for recommendations: Pt with deficits in balance, activity tolerance, strength, pain, sternal precautions impacting overall functional mobility. Pt would benefit from continued therapy to address above deficits. Anticipate with progress will tolerate 3hrs of therapy per day, currently significantly below baseline, lives alone.         Entered by: Rocio Geronimo 09/24/2020 11:02 AM

## 2020-09-24 NOTE — PROGRESS NOTES
Major Shift Events:  Total 750ml of albumin and 500ml of LR given overnight for both hypotension and low UoP. Norepi gtt reordered with hypotension as well, titrated to maintain MAP of 65. Responsive to commands while lightly sedated, only PRN meds for pain management. Hemodynamics within limits.chest tube output minimal. Insulin gtt titrated to maintain glucose control.     Plan: PS this morning and extubate if tolerated.   For vital signs and complete assessments, please see documentation flowsheets.

## 2020-09-24 NOTE — PROGRESS NOTES
09/24/20 1000   Quick Adds   Type of Visit Initial PT Evaluation   Living Environment   Lives With alone   Living Arrangements apartment   Home Accessibility no concerns   Transportation Anticipated car, drives self;family or friend will provide   Living Environment Comment Tub/shower combination   Self-Care   Usual Activity Tolerance moderate   Current Activity Tolerance poor   Regular Exercise No   Equipment Currently Used at Home cane, straight   Activity/Exercise/Self-Care Comment Would like to get back to walking for exercise but has not been able to do recently   Functional Level Prior   Ambulation 1-->assistive equipment   Transferring 0-->independent   Toileting 0-->independent   Bathing 0-->independent   Communication 0-->understands/communicates without difficulty   Swallowing 0-->swallows foods/liquids without difficulty   Cognition 0 - no cognition issues reported   Fall history within last six months yes   Number of times patient has fallen within last six months 2  (blacked out)   Which of the above functional risks had a recent onset or change? ambulation;transferring;toileting;bathing;dressing;fall history   Prior Functional Level Comment Pt uses cane for any mobility out of apartment(taking out garbage, walking to car, ect), IND with mobility within apartment   General Information   Onset of Illness/Injury or Date of Surgery - Date 09/23/20   Referring Physician Aminata Owens   Patient/Family Goals Statement to go home, get back to walks   Pertinent History of Current Problem (include personal factors and/or comorbidities that impact the POC) 84 year old female with history of bicuspid aortic valve with severe aortic stenosis s/p replacement with bioprosthesis and patent foramen ovale s/p repair (2004), HTN, and GERD developed aortic valve restenosis,  s/p redo sternotomy with aortic valve replacement with 19 mm [employer] [place] mechanical prosthesis on 09/23/20 with Dr. Rainey. Post op  echo with mild RV dysfunction . Extubated on 9/24. Postop course uneventful.    Precautions/Limitations fall precautions;sternal precautions   Heart Disease Risk Factors Overweight;Age;Medical history;High blood pressure   Cognitive Status Examination   Orientation orientation to person, place and time  (not oriented to specific date(14 vs 24) but otherwise orient)   Level of Consciousness alert   Follows Commands and Answers Questions 100% of the time   Personal Safety and Judgment intact   Pain Assessment   Patient Currently in Pain Yes, see Vital Sign flowsheet   Posture    Posture Forward head position;Protracted shoulders   Range of Motion (ROM)   ROM Comment BUE limited due to pain at shoulders/chest, BLE grossly WFL   Strength   Strength Comments BUE limitations due to pain, BLE grossly demonstrates at least 3/5   Bed Mobility   Bed Mobility Comments Supine > Sit with mod A x 1-2, mod A to scoot forward   Transfer Skills   Transfer Comments Sit <> Stand from high surface min A   Gait   Gait Comments Bed > Chair with mod A   Balance   Balance Comments unsteady requiring assist with static standing and transfers   General Therapy Interventions   Planned Therapy Interventions balance training;bed mobility training;gait training;ROM;stretching;strengthening;transfer training;risk factor education;home program guidelines;progressive activity/exercise   Clinical Impression   Criteria for Skilled Therapeutic Intervention yes, treatment indicated   PT Diagnosis Impaired functional mobility   Influenced by the following impairments strength, precautions, pain, balance, activity tolerance   Functional limitations due to impairments gait, stairs, transfers, bed mobility, functional endurance   Clinical Presentation Evolving/Changing   Clinical Presentation Rationale S/p valve surgery, clinical reasoning    Clinical Decision Making (Complexity) Moderate complexity   Therapy Frequency 6x/week   Predicted Duration of  "Therapy Intervention (days/wks) 2 weeks   Anticipated Discharge Disposition Acute Rehabilitation Facility   Risk & Benefits of therapy have been explained Yes   Patient, Family & other staff in agreement with plan of care Yes   [facility] TM \"6 Clicks\"   2016, Trustees of [facility], under license to [employer].  All rights reserved.   6 Clicks Short Forms Basic Mobility Inpatient Short Form   [facility]  \"6 Clicks\" V.2 Basic Mobility Inpatient Short Form   1. Turning from your back to your side while in a flat bed without using bedrails? 2 - A Lot   2. Moving from lying on your back to sitting on the side of a flat bed without using bedrails? 2 - A Lot   3. Moving to and from a bed to a chair (including a wheelchair)? 2 - A Lot   4. Standing up from a chair using your arms (e.g., wheelchair, or bedside chair)? 2 - A Lot   5. To walk in hospital room? 2 - A Lot   6. Climbing 3-5 steps with a railing? 1 - Total   Basic Mobility Raw Score (Score out of 24.Lower scores equate to lower levels of function) 11   Total Evaluation Time   Total Evaluation Time (Minutes) 10     "

## 2020-09-24 NOTE — PHARMACY-ANTICOAGULATION SERVICE
Clinical Pharmacy - Warfarin Dosing Consult     Pharmacy has been consulted to manage this patient s warfarin therapy.  Indication: Mechanical Aortic Valve Replacement  Therapy Goal: INR 2-3  Provider/Team: [facility]  [facility]: [facility]  Warfarin Prior to Admission: Yes  Warfarin PTA Regimen: 2 mg on fridays; 4 ROW Takes at 4:30 PM  Significant drug interactions: aspirin, SQ heparin (increased risk of bleeding)    INR   Date Value Ref Range Status   09/23/2020 1.38 (H) 0.86 - 1.14 Final   09/23/2020 1.59 (H) 0.86 - 1.14 Final       Recommend no warfarin dose today as patient still has erector spinae catheters in place.  Pharmacy will monitor Monika Bee daily and order warfarin doses to achieve specified goal.      Please contact pharmacy as soon as possible if the warfarin needs to be held for a procedure or if the warfarin goals change.

## 2020-09-24 NOTE — PROGRESS NOTES
"[facility] Nerve Block Daily Progress Note5]     Monika Bee is a 84 year old female POD #1 s/p redo sternotomy, aortic valve (mechanical prosthesis) replacement of mechanical aortic valve originally replaced in 2004; placement of bilateral T4-5 erector spinae (ES) catheters on 9/23 for postop pain control.    Subjective    24 hour Interval History  - Extubated at 0715 today.  On epi drip, O2 per NC 4 LPM  - Pain: mostly throat pain from airway, anterior chest pain intensity: 5/10 at rest.  Joseline reports she feels \"well blocked\" and the pain after this surgery is much better than the pain she experienced after prior valve replacement surgery in 2004. Receiving local anesthetic nerve block infusion, scheduled Tylenol, PRN IV Dilaudid 0.3 mg x 1, and oxycodone 5 mg x 2 since surgery   - Drains: CT x 4, Rainey catheter in place  - Diet: starting clear liquids, denies nausea  - Activity: has not been OOB yet  - denies LEweakness, paresthesias, circumoral numbness, metallic taste, tinnitus     Antithrombotic or Thrombolytic Therapy ordered:   heparin ANTICOAGULANT injection 5,000 Units, SC, Q8H STEPHANIE     - CVICU note from today indicates \"Plan to bridge with heparin gtt and start warfarin for mechanical valve once stable\"   Analgesic Medications ordered:  Medications related to Pain Management (From now, onward)    Start     Dose/Rate Route Frequency Ordered Stop    09/24/20 0800  aspirin (ASA) chewable tablet 81 mg      81 mg Oral DAILY 09/23/20 1434      09/24/20 0748  oxyCODONE (ROXICODONE) tablet 5-10 mg      5-10 mg Oral EVERY 4 HOURS PRN 09/24/20 0748      09/23/20 2349  HYDROmorphone (PF) (DILAUDID) injection 0.3-0.5 mg      0.3-0.5 mg Intravenous EVERY 3 HOURS PRN 09/23/20 2349      09/23/20 1500  acetaminophen (TYLENOL) tablet 975 mg      975 mg Oral 3 TIMES DAILY 09/23/20 1435      09/23/20 1433  lidocaine 1 % 0.1-1 mL      0.1-1 mL Other EVERY 1 HOUR PRN 09/23/20 1434      09/23/20 " 1433  lidocaine (LMX4) kit       Topical EVERY 1 HOUR PRN 09/23/20 1434      09/23/20 0800  ropivacaine 0.2% (NAROPIN) 750 mL in ON-Q C-Bloc select flow ([id number] holds 600-750 mL) dual cath disposable pump      14 mL/hr  Irrigation CONTINUOUS 09/23/20 0742              Objective  Exam  Vitals:   Temp:  [96.6  F (35.9  C)-100.2  F (37.9  C)] 100.2  F (37.9  C)  Pulse:  [61-91] 86  Resp:  [12-18] 15  BP: (116)/(51) 116/51  MAP:  [63 mmHg-85 mmHg] 76 mmHg  Arterial Line BP: ()/(40-67) 115/50  FiO2 (%):  [40 %-60 %] 40 %  SpO2:  [94 %-100 %] 95 %     General: Alert, oriented, NAD  MSK/Neuro: normal strength upper and lower extremities and overall symmetric.    Skin: bilateral erector spinae (ES) catheter sites with dressing c/d/i, no tenderness, erythema, heme, edema       Labs/Diagnostics  Heme/INR:  Recent Labs   Lab Test 09/24/20  0401 09/23/20  2221 09/23/20  1456   WBC 14.3*  --  17.7*   RBC 3.03*  --  3.66*   HGB 9.8* 10.4* 11.8   HCT 30.2*  --  36.0     --  98   MCH 32.3  --  32.2   MCHC 32.5  --  32.8   RDW 15.7*  --  14.4     --  212       Lab Results   Component Value Date    INR 1.38 09/23/2020    INR 1.59 09/23/2020    INR 1.12 09/23/2020          Assessment/Plan  ASSESSMENT  Monika Bee is a 84 year old female POD #1 s/p redo sternotomy, aortic valve (mechanical prosthesis) replacement of mechanical aortic valve originally replaced in 2004; placement of bilateral T4-5 erector spinae (ES) catheters on 9/23 for postop pain control.    Pain well controlled, tolerating nerve block infusion.  Motor function intact and adequate sensory block. No evidence of adverse side effects related to local anesthetic continuous infusion. Patient is meeting activity goals.  Will benefit from local anesthetic bolus.      0900 Clinician Local Anesthetic Bolus  VSS  - MEDICATION: PF bupivacaine 0.125% 5 mL via right and left nerve block catheters, Total given 10 mL  - PROCEDURE: Clinician bolus  administered incrementally; negative aspirate.  No symptoms of local anesthetic systemic toxicity (LAST). Remained with and assessed patient for 10 min post-injection. BP, P and MAP stable  - POST-PROCEDURE: Bedside RN aware of need to continue BP, P and MAP monitoring Q 10 min for an additional 20 min. Contact RAPS (jobcode ID [id number]) if experiencing any untoward effects or MAP less than 60      PLAN  - Nerve block infusion: continue ROpivacaine 0.2% infusion at 14mL/hr, 7mL/hr each catheter, plan to maintain catheters for a max of 7 days, or sooner if started on warfarin    expected change of On-Q device is tomorrow    patient can be evaluated to receive local anesthetic bolus Q 12 hr PRN pain control.  Bedside RN must page RAPS to request bolus    - Anticoagulation: okay to continue subcutaneous heparin.  Please contact RAPS jobcode pager [phone] before ordering or making any medication changes    -  Follow up: will continue to follow and adjust as needed    Discussed with attending anesthesiologist     --  FLORIN Sims [facility]  [facility] An[facility] Pain Service  9/24/2020 10:39 AM    RAPS Contact Info (24 hour job code pager is the last 4 digits) For in-house use only:   Job code ID: [email or web address] [id number]   [place] 0599  Peds 0602  [employer] phone: dial * * * [phone], enter jobcode ID, then enter call-back number.    Text: Use [employer] on the Intranet <Paging/Directory> tab and enter Jobcode ID.   If no call back at any time, contact the hospital  and ask for RAPS attending or backup

## 2020-09-24 NOTE — PLAN OF CARE
S: Pt was extubated today.   B: Pt admitted post CABG  A:Diet progressed per protocol. Speech consulted. Epi and Insulin gtt off. Vss.   R: Will continue to monitor

## 2020-09-24 NOTE — PROGRESS NOTES
Patient suctioned and electively extubated per physician order at 0715. Placed on LFNC @ 4 LPM, breath sounds were diminished. Patient tolerated procedure well without any immediate complications.    Anthony Benitez, ASHLEY  9/24/2020 7:25 AM

## 2020-09-24 NOTE — PROGRESS NOTES
"[facility] Pain Service Nerve Block Daily Progress Note5]     Monika Bee is a 84 year old female POD #1 s/p redo sternotomy, aortic valve (mechanical prosthesis) replacement of mechanical aortic valve originally replaced in 2004; placement of bilateral T4-5 erector spinae (ES) catheters on 9/23 for postop pain control.    Subjective    24 hour Interval History  - Extubated at 0715 today.  On epi drip, O2 per NC 4 LPM  - Pain: mostly throat pain from airway, anterior chest pain intensity: 5/10 at rest.  Joseline reports she feels \"well blocked\" and the pain after this surgery is much better than the pain she experienced after prior valve replacement surgery in 2004. Receiving local anesthetic nerve block infusion, scheduled Tylenol, PRN IV Dilaudid 0.3 mg x 1, and oxycodone 5 mg x 2 since surgery   - Drains: CT x 4, Rainey catheter in place  - Diet: starting clear liquids, denies nausea  - Activity: has not been OOB yet  - denies LEweakness, paresthesias, circumoral numbness, metallic taste, tinnitus     Antithrombotic or Thrombolytic Therapy ordered:   heparin ANTICOAGULANT injection 5,000 Units, SC, Q8H STEPHANIE     - CVICU note from today indicates \"Plan to bridge with heparin gtt and start warfarin for mechanical valve once stable\"   Analgesic Medications ordered:  Medications related to Pain Management (From now, onward)    Start     Dose/Rate Route Frequency Ordered Stop    09/24/20 0800  aspirin (ASA) chewable tablet 81 mg      81 mg Oral DAILY 09/23/20 1434      09/24/20 0748  oxyCODONE (ROXICODONE) tablet 5-10 mg      5-10 mg Oral EVERY 4 HOURS PRN 09/24/20 0748      09/23/20 2349  HYDROmorphone (PF) (DILAUDID) injection 0.3-0.5 mg      0.3-0.5 mg Intravenous EVERY 3 HOURS PRN 09/23/20 2349      09/23/20 1500  acetaminophen (TYLENOL) tablet 975 mg      975 mg Oral 3 TIMES DAILY 09/23/20 1435      09/23/20 1433  lidocaine 1 % 0.1-1 mL      0.1-1 mL Other EVERY 1 HOUR PRN 09/23/20 1434      09/23/20 " 1433  lidocaine (LMX4) kit       Topical EVERY 1 HOUR PRN 09/23/20 1434      09/23/20 0800  ropivacaine 0.2% (NAROPIN) 750 mL in ON-Q C-Bloc select flow ([id number] holds 600-750 mL) dual cath disposable pump      14 mL/hr  Irrigation CONTINUOUS 09/23/20 0742              Objective  Exam  Vitals:   Temp:  [35.9  C (96.6  F)-37.9  C (100.2  F)] 37.7  C (99.9  F)  Pulse:  [61-91] 82  Resp:  [12-18] 15  BP: (116)/(51) 116/51  MAP:  [63 mmHg-85 mmHg] 83 mmHg  Arterial Line BP: ()/(34-67) 120/59  FiO2 (%):  [40 %-60 %] 40 %  SpO2:  [94 %-100 %] 98 %     General: Alert, oriented, NAD  MSK/Neuro: normal strength upper and lower extremities and overall symmetric.    Skin: bilateral erector spinae (ES) catheter sites with dressing c/d/i, no tenderness, erythema, heme, edema       Labs/Diagnostics  Heme/INR:  Recent Labs   Lab Test 09/24/20  0401 09/23/20  2221 09/23/20  1456   WBC 14.3*  --  17.7*   RBC 3.03*  --  3.66*   HGB 9.8* 10.4* 11.8   HCT 30.2*  --  36.0     --  98   MCH 32.3  --  32.2   MCHC 32.5  --  32.8   RDW 15.7*  --  14.4     --  212       Lab Results   Component Value Date    INR 1.38 09/23/2020    INR 1.59 09/23/2020    INR 1.12 09/23/2020          Assessment/Plan  ASSESSMENT  Monika Bee is a 84 year old female POD #1 s/p redo sternotomy, aortic valve (mechanical prosthesis) replacement of mechanical aortic valve originally replaced in 2004; placement of bilateral T4-5 erector spinae (ES) catheters on 9/23 for postop pain control.    Pain well controlled, tolerating nerve block infusion.  Motor function intact and adequate sensory block. No evidence of adverse side effects related to local anesthetic continuous infusion. Patient is meeting activity goals.  Will benefit from local anesthetic bolus.          PLAN  - Nerve block infusion: continue ROpivacaine 0.2% infusion at 14mL/hr, 7mL/hr each catheter, plan to maintain catheters for a max of 7 days, or sooner if started on  warfarin    expected change of On-Q device is tomorrow    patient can be evaluated to receive local anesthetic bolus Q 12 hr PRN pain control.  Bedside RN must page RAPS to request bolus    - Anticoagulation: okay to continue subcutaneous heparin.  Please contact RAPS jobcode pager [phone] before ordering or making any medication changes    -  Follow up: will continue to follow and adjust as needed         --  Syed Lyman MD  [facility]  9/24/2020 10:39 AM    RAPS Contact Info (24 hour job code pager is the last 4 digits) For in-house use only:   Job code ID: [place] [id number]   [place] 0599  Peds 0602  [employer] phone: dial * * * [phone], enter jobcode ID, then enter call-back number.    Text: Use [employer] on the Intranet <Paging/Directory> tab and enter Jobcode ID.   If no call back at any time, contact the hospital  and ask for RAPS attending or backup

## 2020-09-25 ENCOUNTER — APPOINTMENT (OUTPATIENT)
Dept: PHYSICAL THERAPY | Facility: CLINIC | Age: 85
DRG: 228 | End: 2020-09-25
Attending: SURGERY
Payer: COMMERCIAL

## 2020-09-25 ENCOUNTER — APPOINTMENT (OUTPATIENT)
Dept: SPEECH THERAPY | Facility: CLINIC | Age: 85
DRG: 228 | End: 2020-09-25
Attending: SURGERY
Payer: COMMERCIAL

## 2020-09-25 ENCOUNTER — APPOINTMENT (OUTPATIENT)
Dept: OCCUPATIONAL THERAPY | Facility: CLINIC | Age: 85
DRG: 228 | End: 2020-09-25
Attending: SURGERY
Payer: COMMERCIAL

## 2020-09-25 LAB
ANION GAP SERPL CALCULATED.3IONS-SCNC: 4 MMOL/L (ref 3–14)
BLD PROD TYP BPU: NORMAL
BLD UNIT ID BPU: 0
BLOOD PRODUCT CODE: NORMAL
BPU ID: NORMAL
BUN SERPL-MCNC: 20 MG/DL (ref 7–30)
CALCIUM SERPL-MCNC: 8.4 MG/DL (ref 8.5–10.1)
CHLORIDE SERPL-SCNC: 115 MMOL/L (ref 94–109)
CO2 SERPL-SCNC: 26 MMOL/L (ref 20–32)
CREAT SERPL-MCNC: 1.01 MG/DL (ref 0.52–1.04)
ERYTHROCYTE [DISTWIDTH] IN BLOOD BY AUTOMATED COUNT: 15.3 % (ref 10–15)
GFR SERPL CREATININE-BSD FRML MDRD: 51 ML/MIN/{1.73_M2}
GLUCOSE BLDC GLUCOMTR-MCNC: 101 MG/DL (ref 70–99)
GLUCOSE BLDC GLUCOMTR-MCNC: 101 MG/DL (ref 70–99)
GLUCOSE BLDC GLUCOMTR-MCNC: 105 MG/DL (ref 70–99)
GLUCOSE BLDC GLUCOMTR-MCNC: 126 MG/DL (ref 70–99)
GLUCOSE BLDC GLUCOMTR-MCNC: 145 MG/DL (ref 70–99)
GLUCOSE BLDC GLUCOMTR-MCNC: 86 MG/DL (ref 70–99)
GLUCOSE SERPL-MCNC: 102 MG/DL (ref 70–99)
HBA1C MFR BLD: 5.1 % (ref 0–5.6)
HCT VFR BLD AUTO: 27.2 % (ref 35–47)
HGB BLD-MCNC: 8.4 G/DL (ref 11.7–15.7)
INR PPP: 1.43 (ref 0.86–1.14)
INTERPRETATION ECG - MUSE: NORMAL
INTERPRETATION ECG - MUSE: NORMAL
MAGNESIUM SERPL-MCNC: 2.3 MG/DL (ref 1.6–2.3)
MCH RBC QN AUTO: 32.1 PG (ref 26.5–33)
MCHC RBC AUTO-ENTMCNC: 30.9 G/DL (ref 31.5–36.5)
MCV RBC AUTO: 104 FL (ref 78–100)
PLATELET # BLD AUTO: 119 10E9/L (ref 150–450)
POTASSIUM SERPL-SCNC: 4 MMOL/L (ref 3.4–5.3)
RBC # BLD AUTO: 2.62 10E12/L (ref 3.8–5.2)
SODIUM SERPL-SCNC: 144 MMOL/L (ref 133–144)
TRANSFUSION STATUS PATIENT QL: NORMAL
TRANSFUSION STATUS PATIENT QL: NORMAL
WBC # BLD AUTO: 9.5 10E9/L (ref 4–11)

## 2020-09-25 PROCEDURE — 97535 SELF CARE MNGMENT TRAINING: CPT | Mod: GO

## 2020-09-25 PROCEDURE — 25000132 ZZH RX MED GY IP 250 OP 250 PS 637: Performed by: STUDENT IN AN ORGANIZED HEALTH CARE EDUCATION/TRAINING PROGRAM

## 2020-09-25 PROCEDURE — 25000132 ZZH RX MED GY IP 250 OP 250 PS 637: Performed by: SURGERY

## 2020-09-25 PROCEDURE — 40000048 ZZH STATISTIC DAILY SWAN MONITORING

## 2020-09-25 PROCEDURE — 40000196 ZZH STATISTIC RAPCV CVP MONITORING

## 2020-09-25 PROCEDURE — 40000014 ZZH STATISTIC ARTERIAL MONITORING DAILY

## 2020-09-25 PROCEDURE — 25000125 ZZHC RX 250: Performed by: STUDENT IN AN ORGANIZED HEALTH CARE EDUCATION/TRAINING PROGRAM

## 2020-09-25 PROCEDURE — 00000146 ZZHCL STATISTIC GLUCOSE BY METER IP

## 2020-09-25 PROCEDURE — 25000128 H RX IP 250 OP 636: Performed by: SURGERY

## 2020-09-25 PROCEDURE — 20000004 ZZH R&B ICU UMMC

## 2020-09-25 PROCEDURE — 92526 ORAL FUNCTION THERAPY: CPT | Mod: GN | Performed by: SPEECH-LANGUAGE PATHOLOGIST

## 2020-09-25 PROCEDURE — 83036 HEMOGLOBIN GLYCOSYLATED A1C: CPT | Performed by: STUDENT IN AN ORGANIZED HEALTH CARE EDUCATION/TRAINING PROGRAM

## 2020-09-25 PROCEDURE — 25000128 H RX IP 250 OP 636: Performed by: NURSE PRACTITIONER

## 2020-09-25 PROCEDURE — 27110038 ZZH RX 271: Performed by: STUDENT IN AN ORGANIZED HEALTH CARE EDUCATION/TRAINING PROGRAM

## 2020-09-25 PROCEDURE — 83735 ASSAY OF MAGNESIUM: CPT | Performed by: STUDENT IN AN ORGANIZED HEALTH CARE EDUCATION/TRAINING PROGRAM

## 2020-09-25 PROCEDURE — 97165 OT EVAL LOW COMPLEX 30 MIN: CPT | Mod: GO

## 2020-09-25 PROCEDURE — 25000128 H RX IP 250 OP 636: Performed by: STUDENT IN AN ORGANIZED HEALTH CARE EDUCATION/TRAINING PROGRAM

## 2020-09-25 PROCEDURE — 80048 BASIC METABOLIC PNL TOTAL CA: CPT | Performed by: STUDENT IN AN ORGANIZED HEALTH CARE EDUCATION/TRAINING PROGRAM

## 2020-09-25 PROCEDURE — 85610 PROTHROMBIN TIME: CPT | Performed by: STUDENT IN AN ORGANIZED HEALTH CARE EDUCATION/TRAINING PROGRAM

## 2020-09-25 PROCEDURE — 97530 THERAPEUTIC ACTIVITIES: CPT | Mod: GP

## 2020-09-25 PROCEDURE — 85027 COMPLETE CBC AUTOMATED: CPT | Performed by: STUDENT IN AN ORGANIZED HEALTH CARE EDUCATION/TRAINING PROGRAM

## 2020-09-25 PROCEDURE — 92610 EVALUATE SWALLOWING FUNCTION: CPT | Mod: GN | Performed by: SPEECH-LANGUAGE PATHOLOGIST

## 2020-09-25 RX ORDER — BUPIVACAINE HYDROCHLORIDE 2.5 MG/ML
10 INJECTION, SOLUTION EPIDURAL; INFILTRATION; INTRACAUDAL ONCE
Status: COMPLETED | OUTPATIENT
Start: 2020-09-25 | End: 2020-09-25

## 2020-09-25 RX ORDER — FUROSEMIDE 10 MG/ML
20 INJECTION INTRAMUSCULAR; INTRAVENOUS
Status: DISCONTINUED | OUTPATIENT
Start: 2020-09-25 | End: 2020-09-28

## 2020-09-25 RX ORDER — FUROSEMIDE 10 MG/ML
20 INJECTION INTRAMUSCULAR; INTRAVENOUS ONCE
Status: COMPLETED | OUTPATIENT
Start: 2020-09-25 | End: 2020-09-25

## 2020-09-25 RX ORDER — WARFARIN SODIUM 4 MG/1
4 TABLET ORAL
Status: COMPLETED | OUTPATIENT
Start: 2020-09-25 | End: 2020-09-25

## 2020-09-25 RX ORDER — NICOTINE POLACRILEX 4 MG
15-30 LOZENGE BUCCAL
Status: DISCONTINUED | OUTPATIENT
Start: 2020-09-25 | End: 2020-09-26

## 2020-09-25 RX ORDER — DEXTROSE MONOHYDRATE 25 G/50ML
25-50 INJECTION, SOLUTION INTRAVENOUS
Status: DISCONTINUED | OUTPATIENT
Start: 2020-09-25 | End: 2020-09-26

## 2020-09-25 RX ADMIN — Medication 12.5 MG: at 19:45

## 2020-09-25 RX ADMIN — FUROSEMIDE 20 MG: 10 INJECTION, SOLUTION INTRAVENOUS at 19:45

## 2020-09-25 RX ADMIN — HEPARIN SODIUM 5000 UNITS: 5000 INJECTION, SOLUTION INTRAVENOUS; SUBCUTANEOUS at 06:04

## 2020-09-25 RX ADMIN — FUROSEMIDE 20 MG: 10 INJECTION, SOLUTION INTRAVENOUS at 10:03

## 2020-09-25 RX ADMIN — WARFARIN SODIUM 4 MG: 4 TABLET ORAL at 18:20

## 2020-09-25 RX ADMIN — HYDROMORPHONE HYDROCHLORIDE 0.5 MG: 1 INJECTION, SOLUTION INTRAMUSCULAR; INTRAVENOUS; SUBCUTANEOUS at 08:11

## 2020-09-25 RX ADMIN — DORZOLAMIDE HYDROCHLORIDE AND TIMOLOL MALEATE 1 DROP: 20; 5 SOLUTION/ DROPS OPHTHALMIC at 19:45

## 2020-09-25 RX ADMIN — HEPARIN SODIUM 5000 UNITS: 5000 INJECTION, SOLUTION INTRAVENOUS; SUBCUTANEOUS at 13:48

## 2020-09-25 RX ADMIN — FUROSEMIDE 20 MG: 10 INJECTION, SOLUTION INTRAVENOUS at 16:32

## 2020-09-25 RX ADMIN — HEPARIN SODIUM 5000 UNITS: 5000 INJECTION, SOLUTION INTRAVENOUS; SUBCUTANEOUS at 21:44

## 2020-09-25 RX ADMIN — Medication: at 14:17

## 2020-09-25 RX ADMIN — ASPIRIN 81 MG CHEWABLE TABLET 81 MG: 81 TABLET CHEWABLE at 12:25

## 2020-09-25 RX ADMIN — ACETAMINOPHEN 975 MG: 325 TABLET, FILM COATED ORAL at 19:45

## 2020-09-25 RX ADMIN — ACETAMINOPHEN 975 MG: 325 TABLET, FILM COATED ORAL at 13:47

## 2020-09-25 RX ADMIN — BUPIVACAINE HYDROCHLORIDE 25 MG: 2.5 INJECTION, SOLUTION EPIDURAL; INFILTRATION; INTRACAUDAL at 08:57

## 2020-09-25 RX ADMIN — LATANOPROST 1 DROP: 50 SOLUTION OPHTHALMIC at 21:44

## 2020-09-25 RX ADMIN — DORZOLAMIDE HYDROCHLORIDE AND TIMOLOL MALEATE 1 DROP: 20; 5 SOLUTION/ DROPS OPHTHALMIC at 07:23

## 2020-09-25 RX ADMIN — HYDROMORPHONE HYDROCHLORIDE 0.5 MG: 1 INJECTION, SOLUTION INTRAMUSCULAR; INTRAVENOUS; SUBCUTANEOUS at 03:17

## 2020-09-25 ASSESSMENT — ACTIVITIES OF DAILY LIVING (ADL)
ADLS_ACUITY_SCORE: 14
ADLS_ACUITY_SCORE: 14
ADLS_ACUITY_SCORE: 15
ADLS_ACUITY_SCORE: 14
ADLS_ACUITY_SCORE: 15
ADLS_ACUITY_SCORE: 15

## 2020-09-25 NOTE — PROGRESS NOTES
09/25/20 1102   General Information   Onset Date 09/23/20   Start of Care Date 09/25/20   Referring Physician Dr. Owens   Patient Profile Review/OT: Additional Occupational Profile Info See Profile for full history and prior level of function   Patient/Family Goals Statement Pt's goal is to drink and eat   Swallowing Evaluation Bedside swallow evaluation   Behaviorial Observations Alert   Mode of current nutrition Oral diet   Type of oral diet Regular;Thin liquid   Comments Pt is a 84 year old female with history of bicuspid aortic valve with severe aortic stenosis s/p replacement with bioprosthesis and patent foramen ovale s/p repair (2004), HTN, and GERD developed aortic valve restenosis,  s/p redo sternotomy with aortic valve replacement with 19 mm [employer] [place] [facility] mechanical prosthesis on 09/23/20 with Dr. Rainey. Post op echo with mild RV dysfunction . Extubated on 9/24.   Pt noted to have non productive congested cough at baseline before eval.  Pt denies any h/o dysphagia.   Clinical Swallow Evaluation   Oral Musculature generally intact   Structural Abnormalities none present   Dentition present and adequate   Mucosal Quality dry   Mandibular Strength and Mobility intact   Oral Labial Strength and Mobility WFL   Lingual Strength and Mobility WFL   Buccal Strength and Mobility intact   Clinical Swallow Eval: Thin Liquid Texture Trial   Mode of Presentation, Thin Liquids spoon;cup;fed by clinician;self-fed   Volume of Liquid or Food Presented small sips   Oral Phase of Swallow WFL   Pharyngeal Phase of Swallow other (see comments)  (mild swallow delay)   Diagnostic Statement Pt noted to have slight increase in cough with thin liquid trials.  Mild swallow delay   Clinical Swallow Eval: Nectar Thick Liquid Texture Trial   Mode of Presentation, Nectar cup;spoon;self-fed;fed by clinician   Volume of Nectar Presented 3 oz nectar thick liquids   Oral Phase, [place] WFL   Pharyngeal Phase, [place] intact   Diagnostic  Statement Pt tolerated nectar thick liquids without outward s/sx of aspiration.     Clinical Swallow Eval: Puree Solid Texture Trial   Mode of Presentation, Puree spoon;fed by clinician;self-fed   Volume of Puree Presented pudding 1/2 tsp size   Oral Phase, Puree WFL   Pharyngeal Phase, Puree intact   Diagnostic Statement Pt tolerated puree solids without outward s/sx of aspiration.    Clinical Swallow Eval: Semisolid Texture Trial   Mode of Presentation, Semisolid spoon;fed by clinician   Volume of Semisolid Food Presented bite sized crackers coated with pudding   Oral Phase, Semisolid   (extra time needed for mastication)   Pharyngeal Phase, Semisolid intact   Diagnostic Statement Pt needed extra time for mastication.  Tolerated semisolids without outward s/sx of aspiration   Swallow Compensations   Swallow Compensations Reduce amounts   Esophageal Phase of Swallow   Patient reports or presents with symptoms of esophageal dysphagia No   General Therapy Interventions   Planned Therapy Interventions Dysphagia Treatment   Dysphagia treatment Oropharyngeal exercise training;Modified diet education;Instruction of safe swallow strategies;Compensatory strategies for swallowing   Swallow Eval: Clinical Impressions   Skilled Criteria for Therapy Intervention Skilled criteria met.  Treatment indicated.   Functional Assessment Scale (FAS) 4   Treatment Diagnosis Mild-moderate oropharyngeal dysphagia   Diet texture recommendations Dysphagia diet level 2;Nectar thick liquids   Recommended Feeding/Eating Techniques no straws;small sips/bites;maintain upright posture during/after eating for 30 mins   Demonstrates Need for Referral to Another Service respiratory therapy;physical therapy;occupational therapy;dietitian   Therapy Frequency Daily   Predicted Duration of Therapy Intervention (days/wks) 2   Anticipated Discharge Disposition inpatient rehabilitation facility   Risks and Benefits of Treatment have been explained. Yes    Clinical Impression Comments SLP: Pt seen for a bedside swallow evaluation while up in her chair. Pt noted to have a baseline nonproductive congested cough s/p surgery.  Pt demonstrates mild-moderate oropharyngeal dysphagia.  Pt with slight swallow delay.   Pt tolerated nectar thick liquids puree solids and soft solids without outward s/sx of aspiration.  Pt did take extra time for mastication and oral transit w/ solids.  Though congested cough noted during evaluation, it was not felt to be aspiration related with nectar thick liquids or soft solids.  Pt was noted to have increased incident of cough w/ thin liquid trials w/ increased s/sx of aspiration.  Recommended diet of dysphagia diet level 2 w/ nectar thick liquids, pt should be fully alert and upright for all PO, take small bites/sips,  no straws.  Monitor for s/sx  of aspiration.  If increased s/sx of aspiration noted would hold PO.  ST will continue to follow for diet tolerance and ability to safely adv diet.    Total Evaluation Time   Total Evaluation Time (Minutes) 15

## 2020-09-25 NOTE — PROGRESS NOTES
"Regional Anesthesia Pain Service Nerve Block Daily Progress Note  Monika Bee is a 84 year old female POD #2 s/p redo sternotomy, aortic valve (mechanical prosthesis) replacement of mechanical aortic valve originally replaced in 2004; placement of bilateral T4-5 erector spinae (ES) catheters on 9/23 for postop pain control.    Subjective    24 hour Interval History  - No acute events overnight  - Throat pain resolved, anterior chest pain intensity: 5/10 at rest.  Joseline continues to report pain control after this surgery is much better than it was after prior valve replacement surgery in 2004. Receiving local anesthetic nerve block infusion, scheduled Tylenol, PRN IV Dilaudid (did not pass swallow study yesterday)  - Drains: CT x 4, Rainey catheter in place  - Activity: up to chair with assist  - denies LE weakness, paresthesias, circumoral numbness, metallic taste, tinnitus     Antithrombotic or Thrombolytic Therapy ordered:   heparin ANTICOAGULANT injection 5,000 Units, SC, Q8H STEPHANIE   - CVICU note from today indicates plan to \"start warfarin tonight\"    Analgesic Medications ordered:  Medications related to Pain Management (From now, onward)    Start     Dose/Rate Route Frequency Ordered Stop    09/24/20 0800  aspirin (ASA) chewable tablet 81 mg      81 mg Oral DAILY 09/23/20 1434      09/24/20 0748  oxyCODONE (ROXICODONE) tablet 5-10 mg      5-10 mg Oral EVERY 4 HOURS PRN 09/24/20 0748      09/23/20 2349  HYDROmorphone (PF) (DILAUDID) injection 0.3-0.5 mg      0.3-0.5 mg Intravenous EVERY 3 HOURS PRN 09/23/20 2349      09/23/20 1500  acetaminophen (TYLENOL) tablet 975 mg      975 mg Oral 3 TIMES DAILY 09/23/20 1435      09/23/20 1433  lidocaine 1 % 0.1-1 mL      0.1-1 mL Other EVERY 1 HOUR PRN 09/23/20 1434      09/23/20 1433  lidocaine (LMX4) kit       Topical EVERY 1 HOUR PRN 09/23/20 1434      09/23/20 0800  ropivacaine 0.2% (NAROPIN) 750 mL in ON-Q C-Bloc select flow (PC0823 holds 600-750 mL) dual cath " disposable pump      14 mL/hr  Irrigation CONTINUOUS 09/23/20 0742              Objective  Exam  Vitals:   Temp:  [98.6  F (37  C)-100  F (37.8  C)] 99.1  F (37.3  C)  Pulse:  [72-84] 76  Resp:  [15-21] 18  BP: (101-142)/(39-80) 117/80  MAP:  [60 mmHg-99 mmHg] 99 mmHg  Arterial Line BP: ()/(33-66) 153/66  SpO2:  [90 %-100 %] 97 %     General: Alert, oriented, NAD  MSK/Neuro: normal strength upper and lower extremities and overall symmetric.   Skin: bilateral erector spinae (ES) catheter sites with dressing c/d/i, no tenderness, erythema, heme, edema       Labs/Diagnostics  Heme/INR:  Recent Labs   Lab Test 09/25/20  0324 09/24/20  0401   WBC 9.5 14.3*   RBC 2.62* 3.03*   HGB 8.4* 9.8*   HCT 27.2* 30.2*   * 100   MCH 32.1 32.3   MCHC 30.9* 32.5   RDW 15.3* 15.7*   * 187       Lab Results   Component Value Date    INR 1.43 09/25/2020    INR 1.38 09/23/2020    INR 1.59 09/23/2020          Assessment/Plan  ASSESSMENT  Monika Bee is a 84 year old female POD #2 s/p redo sternotomy, aortic valve (mechanical prosthesis) replacement of mechanical aortic valve originally replaced in 2004; placement of bilateral T4-5 erector spinae (ES) catheters on 9/23 for postop pain control.    Pain well controlled, tolerating nerve block infusion.  Motor function intact and adequate sensory block. No evidence of adverse side effects related to local anesthetic continuous infusion. Patient is meeting activity goals.  Will benefit from  nonopioid analgesia -local anesthetic bolus.      0900 Clinician Local Anesthetic Bolus  VSS  - MEDICATION: PF bupivacaine 0.25% 5mL via right and left nerve block catheters, Total given 10 mL  - PROCEDURE: Clinician bolus administered incrementally; negative aspirate.  No symptoms of local anesthetic systemic toxicity (LAST). Remained with and assessed patient for 10 min post-injection. BP, P and MAP stable  - POST-PROCEDURE: Bedside RN aware of need to continue BP, P and MAP  monitoring Q 10 min for an additional 20 min. Contact RAPS (jobcode ID [id number]) if experiencing any untoward effects or MAP less than 60       PLAN  - Nerve block infusion: continue ROpivacaine 0.2% infusion at 14mL/hr, 7mL/hr each catheter    expected change of On-Q device is today - bedside RN aware    patient can be evaluated to receive local anesthetic bolus Q 12 hr PRN pain control.  Bedside RN must page RAPS to request bolus    - Anticoagulation: plan to start warfarin tonight, okay to continue subcutaneous heparin as ordered.    Plans to start warfarin today so RAPS will remove nerve block catheters tomorrow AM, at least 4 hours after last dose subcutaneous heparin    Please contact RAPS jobcode pager [phone] before ordering or making any medication changes    -  Follow up: will continue to follow and adjust as needed    Discussed with attending anesthesiologist     --  FLORIN Sims [facility]  Regional Anesthesia Pain Service  9/25/2020 4:55 PM    RAPS Contact Info (24 hour job code pager is the last 4 digits) For in-house use only:   Job code ID: [id number]   [facility] 0599  [facility] [address]  [employer] phone: dial * * * [phone], enter jobcode ID, then enter call-back number.    Text: Use [employer] on the Intranet <Paging/Directory> tab and enter Jobcode ID.   If no call back at any time, contact the hospital  and ask for RAPS attending or backup

## 2020-09-25 NOTE — PLAN OF CARE
Major Shift Events: PA, A line pulled. Orders written 6C. Rainey pulled at 1400, due to void. 20mg of Lasix given twice with minimal output. Worked with PT/OT/SLP. Up to chair x2. DD2/mechanical soft diet with nectar thin liquids, tolerating well. Educated patient on pulmonary toileting, needs frequent encouragement and follow up.   Plan: Continue pulmonary toileting, monitor urine output and proceed per protocol. Transfer to floor when bed is available.   For vital signs and complete assessments, please see documentation flowsheets.

## 2020-09-25 NOTE — PROGRESS NOTES
Social Work: Assessment with Discharge Plan    Patient Name:  Monika Bee  :  1935  Age:  84 year old  MRN:  [id number]  Risk/Complexity Score:  Filed Complexity Screen Score: 7  Completed assessment with:  Patient and sister Pat at bedside     Presenting Information   Reason for Referral:  Discharge plan  Date of Intake:  2020  Referral Source:  Physician  Decision Maker:  Patient   Alternate Decision Maker:  Per [facility] policy  Health Care Directive:  Will bring in copy  Living Situation:  Apartment  Previous Functional Status:  Independent  Patient and family understanding of hospitalization:  Pt and family have appropriate understanding of hospitalization.   Cultural/Language/Spiritual Considerations:  Pt is a 84 year old female pt who identifies as Restorationist.   Adjustment to Illness:  Pt appears to be adjusting well. Pt very pleasant and conversant throughout assessment.     Physical Health  Reason for Admission:    1. Aortic stenosis    2. Aortic stenosis    3. History of aortic valve replacement      Services Needed/Recommended:  ARU    Mental Health/Chemical Dependency  Diagnosis:  Per pt and chart review, none.   Support/Services in Place:  Per pt and chart review, none.   Services Needed/Recommended:  NA    Support System  Significant relationship at present time:  Pt's sister's Pat (lives 20 miles from pt) and Nydia (lives 3 miles from pt.)  Family of origin is available for support:  Yes, family supportive.   Other support available:  Friend, Belkis   Gaps in support system:  Pt does not have 24 hr care giving available.   Patient is caregiver to:  None     Provider Information   Primary Care Physician:  [facility]   [phone]   Clinic:  [place] [address] / [facility] [address]      :  HETAL    Financial   Income Source:  assisted, social security   Financial Concerns:  Pt denied any concerns at this time.  Insurance:    Payor/Plan  Subscriber Name Rel Member # Group #   BCBS - BCBS MEDICARE * REYNA LOMBARDO* JUANITA [id number]       [id number]       Discharge Plan   Patient and family discharge goal:  ARU  Provided education on discharge plan:  YES  Patient agreeable to discharge plan:  YES  A list of Medicare Certified Facilities was provided to the patient and/or family to encourage patient choice. Patient's choices for facility are:    - [facility] ph: [phone] f: [phone]  Will NH provide Skilled rehabilitation or complex medical:  YES  General information regarding anticipated insurance coverage and possible out of pocket cost was discussed. Patient and patient's family are aware patient may incur the cost of transportation to the facility, pending insurance payment: YES  Barriers to discharge:  Medical stability     Discharge Recommendations   Anticipated Disposition:  Facility:  ARU TBD  Transportation Needs:  Family:  Friend Belkis vs medical transport     Additional comments   GUDELIAA completed with pt and sister, Pat at bedside. GUDELIA introduced self and role on treatment team. Pt and Pat agreeable with discussing with GUDELIA.  GUDELIA discussed current therapy recommendations. GUDELIA provided overview and education on ARU. Pt and Pat are agreeable to an ARU stay. Pt and Pat asked appropriate questions regarding ARU. GUDELIA provided medicare.gov list of rated facilities to pt and Pat. [facility] however will look over the list.   GUDELIA discussed with pt and Pat private pay transportation and estimated cost, if pt's friend Belkis cannot transport her at time of discharge.  GUDELIA will continue to follow for discharge planning needs.    Addend 1400: [facility] [phone]. Per admissions, they are accepting pt's however their ARU has moved to [facility] in [place] due to ARU in [place] being used as a COVID unit.   GUDELIA met with pt to discuss this. Pt is agreeable to [place]. Pt  requested SW come back when pt's sister is here to discuss.   SW spoke with Caty, she would be agreeable to [facility] ARU. Per Caty, she and Joseline are also thinking about  ARU.     NICOLE Evangelista, [facility]  Adult Acute Care Float SW  4A Neuro/4E ICU ph: [phone]  Pgr: [phone]

## 2020-09-25 NOTE — OP NOTE
Procedure Date: 09/23/2020      PREOPERATIVE DIAGNOSES:     1.  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   2.  History of bicuspid aortic valve with severe stenosis, status post mechanical prosthesis replacement in 2004.      POSTOPERATIVE DIAGNOSS:     1.  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   2.  History of bicuspid aortic valve with severe stenosis, status post mechanical prosthesis replacement in 2004.      PROCEDURES:   1.  Redo median sternotomy.   2.  Redo aortic valve replacement with 19-mm St. Romeo Normangee's mechanical valve.      SURGEON:  Jey Rainey MD      COSURGEON:  Dr. Alfie Lopez        ASSISTANT:  Aminata Owens MD.      NOTE:  A second attending surgeon was requested for the operation because of the absence of any qualified resident or fellow.      OPERATIVE INDICATIONS:  The patient is an 84-year-old female with severe prosthetic aortic valve stenosis with symptoms.  A decision was made to proceed with aortic valve replacement.  I discussed risks and benefits of surgery with the patient including risk of death, stroke, bleeding, wound from renal failure, and arrhythmias.  She asked to proceed with surgery.      OPERATIVE FINDINGS:  The patient's sternum was of adequate quality.  There were moderate adhesions in the pericardial space.  The aorta was mildly dilated and appeared thin-walled.  There was pannus seen him in the leaflet of the mechanical aortic valve with subvalvar pannus.      DESCRIPTION OF PROCEDURE:  The patient was brought to the operating room in stable condition with emergent general anesthesia.  The patient's chest, abdomen and lower extremities prepped and draped in the usual manner.  Redo median sternotomy was performed with the oscillating saw.  Adhesions were carefully dissected to expose the aorta and the right atrium.  Prep and cardiopulmonary bypass included ACT-guided heparinization and  admission of [facility].  Aorta was cannulated with an 18-Japanese arterial cannula via arthritic pursestring pledgeted suture x 2.  Venous cannula was placed in the right atrium.  Full cardiopulmonary bypass was initiated.  Antegrade and retrograde cardioplegic cannula were also placed.  Left ventricular vent was also placed.  The root was carefully dissected to expose the previous suture line.  Aortic pressure was temporarily reduced, and the aorta was cross-clamped.  Then 1.5 liters of cold blood cardioplegia was administered with antegrade and retrograde routes.  Subsequent dose of cardioplegia was given at no greater than 20 minute intervals, via the retrograde route.  The aortotomy was performed adjacent to the previous suture line.  The aortic valve was identified and carefully explanted by dividing the sutures and removing the existing pledgets.  The annulus was also thoroughly debrided.  The coronary ostia appeared fairly close to the annulus and because of that, we decided to replace this valve with another mechanical valve because of the low profile.  Two direct pledgeted sutures were placed on the annulus with pledgets on the ventricular side.  These sutures were then passed through the sewing ring over a 19-mm St. Romeo mechanical Rushford's valve, which was seated and tied without difficulty using the Cor-Knot device.  The oblique aortotomy was closed with 4-0 Prolene buttressed with a bovine pericardial felt strip.  A warm dose of retrograde hotshot cardioplegia was given and with high suction both vents and cross-clamp released.  Organized rhythm resumed without the need for defibrillations.  Rewarming and reperfusion allowed.  De-airing was confirmed by echocardiography.  The patient was gradually weaned off cardiopulmonary bypass, low dose epinephrine for internal cardiopulmonary bypass, and LV function within normal limits.  There was normal function of prosthetic valve with no paravalvular leak.   Protamine was given and all cannulas removed.  Careful hemostasis was obtained.  Two anterior mediastinal and bilateral pleural chest tubes placed.  Sternum was closed with surgical steel wires.  Fascia, subcutaneous tissue, and skin of chest were closed in layers.  The patient was taken to ICU in stable critical condition.         SHRAVAN SHARMA MD             D: 2020   T: 2020   MT:       Name:     REYNA LOMBARDO   MRN:      [id number]        Account:        [id number]   :      1935           Procedure Date: 2020      Document: [id number]

## 2020-09-25 NOTE — PLAN OF CARE
Discharge Planner OT   Patient plan for discharge: Pt would like to return home independently.   Current status: Evaluation completed and treatment initiated. Therapist educated pt on OT/CR role and discussed POC/Goals. Educated pt on sternal precautions and safety with functional transfers/ADLs. Pt required Min-Mod A x 2 for transfer sit<->standing x 3 reps with seated rest breaks. Pt completed self cares with setup, vc's and Min A. Pt tolerated therapy session well. VSS. 3L O2 NC.   Barriers to return to prior living situation: Decreased independence with functional transfers and ADLs. Decreased strength/endurance, sternal precautions.  Recommendations for discharge: ARU  Rationale for recommendations: Pt is independent and living alone at baseline. Pt will benefit from continued therapy to address barriers above and to maximize functional independence. Pt will tolerate 3hrs of therapy to return to independent function.        Entered by: Virgil Ramos 09/25/2020 5:15 PM

## 2020-09-25 NOTE — PROGRESS NOTES
CV ICU PROGRESS NOTE  September 24, 2020   Monika Bee  [id number]  Admitted: 9/23/2020  5:21 AM      CO-MORBIDITIES:   Aortic stenosis  Aortic stenosis  History of aortic valve replacement    ASSESSMENT: Monika Bee is a 84 year old female with history of bicuspid aortic valve with severe aortic stenosis s/p replacement with bioprosthesis and patent foramen ovale s/p repair (2004), HTN, and GERD developed aortic valve restenosis,  s/p redo sternotomy with aortic valve replacement with 19 mm St Romeo Corea mechanical prosthesis on 09/23/20 with Dr. Rainey. Post op echo with mild RV dysfunction . Extubated on 9/24. Postop course uneventful.     TODAY'S PROGRESS:   Start warfarin tonight  Plan for removal of erector spinae catheters tomorrow  Speech eval today  ASA 81 PO if passes speech eval  Lasix 20 mg IV BID  Remove Eileen goodwin today  Possible removal of chest tubes   Cap pacer wires  To the floor    PLAN:   Neuro/ pain/ sedation:  - Erector spinae catheter,   jacquie tylenol, oxycodone PRN- unable to give since she didn't pass swallow study  Currently on IV dilaudid      Pulmonary care:   - Extubated AM 9/24. On 2L NC.  - PT, mobilize      Cardiovascular:    - Post op echo with mild RV dysfunction   - Off pressors  - Pacer depended for 1 hr postop, V wires. In SR, pacer sensing --> cap today  - ASA 81 ordered but not given because hasn't passed swallow yet  - Metoprolol 12.5 BID     GI care/ Nutrition:   - Speech evaluation today, currently NPO except ice chips  - Bowel regimen  - Protonix      Renal/ Fluids/ Electrolytes:   - Electrolyte replacement protocol  - Hold PTA spirinolactone  - Lasix 20 mg IV BID  - Will continue to monitor intake and output.  - Consider removal of goodwin later this afternoon, after diuresis      Endocrine:    #Perioperative hyperglycemia  -insulin gtt, switched to sliding scale     ID/ Antibiotics:  #Intraop prophylactic antibiotics: cefepime  48hrs+ vanc 24hrs (9/23/20)    -No other indication for antibiotics.      Heme:     -Hemoglobin to trend. Maintain Hgb > 7.0  No concerning CT drainage  - subcutaneous heparin  -  start warfarin tonight for mechanical valve once stable (no bridging with heparin gtt per )      Prophylaxis:    -Mechanical prophylaxis for DVT.   -SubQ heparin   -Bowel regimen     Lines/ tubes/ drains:  -R.IJ central cath, PA cath, A.Line, PIVx2, goodwin (remove goodwin and Mangum today)    Disposition: To the floor    Patient seen, findings and plan discussed with CV ICU staff  .    Daniele Rainey MD   September 24, 2020   CV ICU resident  [place] [id number]       ====================================    TODAY'S PROGRESS:   SUBJECTIVE:   NPO because failed bedside swallow. Ice chips only. Not taking PO medications, pain controlled with IV dilaudid. Lasix 20 mg IV yesterday, then given albumin overnight.     OBJECTIVE:   1. VITAL SIGNS:   Temp:  [98.8  F (37.1  C)-100  F (37.8  C)] 99.9  F (37.7  C)  Pulse:  [72-90] 80  Resp:  [15-21] 21  MAP:  [60 mmHg-100 mmHg] 75 mmHg  Arterial Line BP: ()/(33-82) 119/46  SpO2:  [90 %-100 %] 93 %  Ventilation Mode: (S) CPAP/PS  (Continuous positive airway pressure with Pressure Support)  FiO2 (%): 40 %  Rate Set (breaths/minute): 14 breaths/min  Tidal Volume Set (mL): 450 mL  PEEP (cm H2O): 5 cmH2O  Pressure Support (cm H2O): (S) 10 cmH2O  Oxygen Concentration (%): 40 %  Resp: 21      2. INTAKE/ OUTPUT:   I/O last 3 completed shifts:  In: 1000.6 [I.V.:750.6]  Out: 1520 [Urine:1000; Chest Tube:520]    3. PHYSICAL EXAMINATION:   General:  Appears comfortable   CV: SR, not paced   Resp: 2L O2, Non laboured breathing, CT dressing intact, dry    Abd: Soft, not distended, no rigidity  Ext: Warm and perfused   Skin: No issues at present     4. INVESTIGATIONS:   Arterial Blood Gases   Recent Labs   Lab 09/24/20  0643 09/24/20  0401 09/23/20  2221 09/23/20  1718   PH 7.38 7.38 7.42 7.39   PCO2 36 41 32* 40   PO2 88 84 101 108*    HCO3 21 24 21 24     Complete Blood Count   Recent Labs   Lab 09/25/20 0324 09/24/20 0401 09/23/20  2221 09/23/20  1456 09/23/20 1300 09/21/20  1259   WBC 9.5 14.3*  --  17.7*  --   --   --  7.9   HGB 8.4* 9.8* 10.4* 11.8   < >  --    < > 11.5*   * 187  --  212  --  175  --  311    < > = values in this interval not displayed.     Basic Metabolic Panel  Recent Labs   Lab 09/25/20 0324 09/24/20  1815 09/24/20  1729 09/24/20  0737 09/24/20 0401 09/23/20  1456 09/23/20  1338  09/21/20  1259     --   --   --  145* 142 141   < > 138   POTASSIUM 4.0 4.4 2.6* 3.9 3.6 4.0 4.3   < > 4.1   CHLORIDE 115*  --   --   --  113* 113* 112*   < > 106   CO2 26  --   --   --  23 24  --   --  26   BUN 20  --   --   --  17 13  --   --  19   CR 1.01  --   --   --  0.99 0.73  --   --  0.93   *  --   --   --  140* 158* 164*   < > 100*    < > = values in this interval not displayed.     Liver Function Tests  Recent Labs   Lab 09/25/20 0324 09/24/20 0401 09/23/20 1456 09/23/20 1300 09/23/20  0629 09/21/20  1259   AST  --  41 Canceled, Test credited  --   --  41   ALT  --  15 23  --   --  22   ALKPHOS  --  50 54  --   --  74   BILITOTAL  --  1.1 1.2  --   --  0.8   ALBUMIN  --  3.4 2.5*  --   --  3.7   INR 1.43*  --  1.38* 1.59* 1.12 1.54*     Pancreatic Enzymes  No lab results found in last 7 days.  Coagulation Profile  Recent Labs   Lab 09/25/20 0324 09/23/20 1456 09/23/20 1300 09/23/20  0629 09/21/20  1259   INR 1.43* 1.38* 1.59* 1.12 1.54*   PTT  --  32 36  --  33     Lactate  Invalid input(s): LACTATE    5. RADIOLOGY:   No results found for this or any previous visit (from the past 24 hour(s)).=========================================

## 2020-09-25 NOTE — PROGRESS NOTES
Major Shift Events:  250 of albumin given for low UOP and concurrent borderline lower MAP. Urine unresponsive to fluid, but BP improved. 20 of Lasix given after with improvement in UOP and BP stable after as well. PRN dilaudid as needed. Chest tube output minimal.     Plan: Up to chair as able, speech consult today.     For vital signs and complete assessments, please see documentation flowsheets.

## 2020-09-25 NOTE — PROGRESS NOTES
Regional Anesthesia Catheter Evaluation    Assessed patient on evening pain rounds. Upon arrival, pt reporting 4/10 pain that increased significantly with deep breathing. Pt denied symptoms of LAST, she has chronic tinnitus but this was no worse than usual. Catheter Site intact, unobstructed and without abnormalities. Pt hemodynamically stable.     Bolus administered at 1830    - MEDICATION: PF bupivacaine 0.25% 5 mL via bilateral nerve block catheters, Total given 10mL  - PROCEDURE: Clinician bolus administered incrementally; negative aspirate.  No symptoms of local anesthetic systemic toxicity (LAST). Remained with and assessed patient for 10 min post-injection. BP, P and MAP stable  - POST-PROCEDURE: Bedside RN aware of need to continue BP, P and MAP monitoring Q 10 min for an additional 30 min. Contact RAPS (jobcode ID [id number]) if experiencing any untoward effects or MAP less than 60    Kathy Baker MD  9/24/2020 8:11 PM  Anesthesia Resident  PGY-3/CA-2

## 2020-09-25 NOTE — PROGRESS NOTES
09/25/20 0900   Quick Adds   Type of Visit Initial Occupational Therapy Evaluation   Living Environment   Lives With alone   Living Arrangements apartment   Home Accessibility other (see comments)   Transportation Anticipated car, drives self;family or friend will provide   Living Environment Comment Pt has a tub/shower.   Self-Care   Usual Activity Tolerance moderate   Current Activity Tolerance fair   Regular Exercise No   Equipment Currently Used at Home cane, straight   Functional Level   Ambulation 1-->assistive equipment   Transferring 0-->independent   Toileting 0-->independent   Bathing 0-->independent   Dressing 0-->independent   Eating 0-->independent   Communication 0-->understands/communicates without difficulty   Swallowing 0-->swallows foods/liquids without difficulty   Cognition 0 - no cognition issues reported   Fall history within last six months yes   Number of times patient has fallen within last six months 4   Which of the above functional risks had a recent onset or change? ambulation;transferring;toileting;bathing;dressing   General Information   Onset of Illness/Injury or Date of Surgery - Date 09/23/20   Referring Physician Aminata Owens MD     Patient/Family Goals Statement Pt would like to be able to return home independently.    Additional Occupational Profile Info/Pertinent History of Current Problem Monika Bee is a 84 year old female with history of bicuspid aortic valve with severe aortic stenosis s/p replacement with bioprosthesis and patent foramen ovale s/p repair (2004), HTN, and GERD developed aortic valve restenosis,  s/p redo sternotomy with aortic valve replacement with 19 mm St Romeo [place] mechanical prosthesis on 09/23/20 with Dr. Rainey. Post op echo with mild RV dysfunction . Extubated on 9/24. Postop course uneventful.    Precautions/Limitations fall precautions;sternal precautions   Weight-Bearing Status - LUE   (10lbs)   Weight-Bearing Status - RUE    (10lbs)   Weight-Bearing Status - LLE full weight-bearing   Weight-Bearing Status - RLE full weight-bearing   Cognitive Status Examination   Orientation orientation to person, place and time   Level of Consciousness alert   Follows Commands (Cognition) WFL   Visual Perception   Visual Perception No deficits were identified;Wears glasses   Sensory Examination   Sensory Quick Adds No deficits were identified   Pain Assessment   Patient Currently in Pain Yes, see Vital Sign flowsheet   Integumentary/Edema   Integumentary/Edema Comments LUE edema present.    Range of Motion (ROM)   ROM Comment BUE AROM limited by pain and precautions.    Strength   Strength Comments Pt demonstrates generalized post op weakness.    Transfer Skills   Transfer Comments Min-Mod A x2 and Max vc's for transfer sit<->standing    Activities of Daily Living Analysis   Impairments Contributing to Impaired Activities of Daily Living balance impaired;fear and anxiety;flexibility decreased;pain;post surgical precautions;ROM decreased;strength decreased   General Therapy Interventions   Planned Therapy Interventions ADL retraining;IADL retraining;bed mobility training;cognition;ROM;strengthening;stretching;transfer training;home program guidelines;progressive activity/exercise   Clinical Impression   Criteria for Skilled Therapeutic Interventions Met yes, treatment indicated   OT Diagnosis Decreased independence with functional transfers and ADLs.   Influenced by the following impairments Decreased functional mobility. Decreased strength/endurance.   Assessment of Occupational Performance 3-5 Performance Deficits   Identified Performance Deficits Decreased independence with funcntional transfers/ADLs, Decreased stregnth/endurance and ROM.    Clinical Decision Making (Complexity) Low complexity   Therapy Frequency 6x/week   Predicted Duration of Therapy Intervention (days/wks) 10/9/2020   Anticipated Discharge Disposition Acute Rehabilitation  "Facility   Risks and Benefits of Treatment have been explained. Yes   Patient, Family & other staff in agreement with plan of care Yes   [facility] AM-PAC  \"6 Clicks\" Daily Activity Inpatient Short Form   1. Putting on and taking off regular lower body clothing? 2 - A Lot   2. Bathing (including washing, rinsing, drying)? 2 - A Lot   3. Toileting, which includes using toilet, bedpan or urinal? 2 - A Lot   4. Putting on and taking off regular upper body clothing? 2 - A Lot   5. Taking care of personal grooming such as brushing teeth? 3 - A Little   6. Eating meals? 4 - None   Daily Activity Raw Score (Score out of 24.Lower scores equate to lower levels of function) 15   Total Evaluation Time   Total Evaluation Time (Minutes) 10     "

## 2020-09-25 NOTE — PLAN OF CARE
PT / 4E -     Discharge Planner PT   Patient plan for discharge: rehab with goals to return home  Current status: Demonstrating rolling with Mod A and lying>sitting transfer with Mod A x 2.  Transferred sitting>standing from higher and lower surface with Min A/CGA.  Needing Mod A for line management and navigation of FWW with step pivot transfer.    Barriers to return to prior living situation: level of assist, lives alone, sternal precautions  Recommendations for discharge: ARU  Rationale for recommendations: Joseline is motivated and ambitious to return to independence, she has multidisciplinary needs and would tolerate 3 hours of therapy.        Entered by: Penny Saez 09/25/2020 8:55 AM

## 2020-09-25 NOTE — PLAN OF CARE
Discharge Planner SLP   Patient plan for discharge: Pt plans to go home  Current status: Pt seen for a bedside swallow evaluation while up in her chair. Pt noted to have a baseline nonproductive congested cough s/p surgery.  Pt demonstrates mild-moderate oropharyngeal dysphagia.  Pt with slight swallow delay.   Pt tolerated nectar thick liquids puree solids and soft solids without outward s/sx of aspiration.  Pt did take extra time for mastication and oral transit w/ solids.  Though congested cough noted during evaluation, it was not felt to be aspiration related with nectar thick liquids or soft solids.  Pt was noted to have increased incident of cough w/ thin liquid trials w/ increased s/sx of aspiration.      Recommended diet of dysphagia diet level 2 w/ nectar thick liquids, pt should be fully alert and upright for all PO, take small bites/sips,  no straws.  Monitor for s/sx  of aspiration.  If increased s/sx of aspiration noted would hold PO.      ST will continue to follow for diet tolerance and ability to safely adv diet.   Barriers to return to prior living situation: None from SLP stand point  Recommendations for discharge: Pt may need follow up therapy if not at baseline diet at discharge  Rationale for recommendations: Ongoing dysphagia risk       Entered by: Veronika Gimenez 09/25/2020 11:45 AM

## 2020-09-26 ENCOUNTER — APPOINTMENT (OUTPATIENT)
Dept: SPEECH THERAPY | Facility: CLINIC | Age: 85
DRG: 228 | End: 2020-09-26
Attending: SURGERY
Payer: COMMERCIAL

## 2020-09-26 ENCOUNTER — APPOINTMENT (OUTPATIENT)
Dept: GENERAL RADIOLOGY | Facility: CLINIC | Age: 85
DRG: 228 | End: 2020-09-26
Attending: SURGERY
Payer: COMMERCIAL

## 2020-09-26 ENCOUNTER — APPOINTMENT (OUTPATIENT)
Dept: PHYSICAL THERAPY | Facility: CLINIC | Age: 85
DRG: 228 | End: 2020-09-26
Attending: SURGERY
Payer: COMMERCIAL

## 2020-09-26 LAB
ABO + RH BLD: ABNORMAL
ABO + RH BLD: ABNORMAL
ANION GAP SERPL CALCULATED.3IONS-SCNC: 4 MMOL/L (ref 3–14)
BLD GP AB SCN SERPL QL: ABNORMAL
BLOOD BANK CMNT PATIENT-IMP: ABNORMAL
BLOOD BANK CMNT PATIENT-IMP: ABNORMAL
BUN SERPL-MCNC: 28 MG/DL (ref 7–30)
CALCIUM SERPL-MCNC: 8.4 MG/DL (ref 8.5–10.1)
CHLORIDE SERPL-SCNC: 113 MMOL/L (ref 94–109)
CO2 SERPL-SCNC: 28 MMOL/L (ref 20–32)
CREAT SERPL-MCNC: 1.07 MG/DL (ref 0.52–1.04)
ERYTHROCYTE [DISTWIDTH] IN BLOOD BY AUTOMATED COUNT: 14.5 % (ref 10–15)
GFR SERPL CREATININE-BSD FRML MDRD: 48 ML/MIN/{1.73_M2}
GLUCOSE BLDC GLUCOMTR-MCNC: 106 MG/DL (ref 70–99)
GLUCOSE BLDC GLUCOMTR-MCNC: 106 MG/DL (ref 70–99)
GLUCOSE BLDC GLUCOMTR-MCNC: 117 MG/DL (ref 70–99)
GLUCOSE BLDC GLUCOMTR-MCNC: 126 MG/DL (ref 70–99)
GLUCOSE BLDC GLUCOMTR-MCNC: 96 MG/DL (ref 70–99)
GLUCOSE SERPL-MCNC: 100 MG/DL (ref 70–99)
HCT VFR BLD AUTO: 25.5 % (ref 35–47)
HGB BLD-MCNC: 8 G/DL (ref 11.7–15.7)
INR PPP: 1.44 (ref 0.86–1.14)
MAGNESIUM SERPL-MCNC: 2.2 MG/DL (ref 1.6–2.3)
MCH RBC QN AUTO: 32.9 PG (ref 26.5–33)
MCHC RBC AUTO-ENTMCNC: 31.4 G/DL (ref 31.5–36.5)
MCV RBC AUTO: 105 FL (ref 78–100)
PHOSPHATE SERPL-MCNC: 3.6 MG/DL (ref 2.5–4.5)
PLATELET # BLD AUTO: 136 10E9/L (ref 150–450)
POTASSIUM SERPL-SCNC: 3.6 MMOL/L (ref 3.4–5.3)
RBC # BLD AUTO: 2.43 10E12/L (ref 3.8–5.2)
SODIUM SERPL-SCNC: 145 MMOL/L (ref 133–144)
SPECIMEN EXP DATE BLD: ABNORMAL
WBC # BLD AUTO: 8.8 10E9/L (ref 4–11)

## 2020-09-26 PROCEDURE — 85610 PROTHROMBIN TIME: CPT | Performed by: STUDENT IN AN ORGANIZED HEALTH CARE EDUCATION/TRAINING PROGRAM

## 2020-09-26 PROCEDURE — 86901 BLOOD TYPING SEROLOGIC RH(D): CPT | Performed by: SURGERY

## 2020-09-26 PROCEDURE — 40000196 ZZH STATISTIC RAPCV CVP MONITORING

## 2020-09-26 PROCEDURE — 25000128 H RX IP 250 OP 636: Performed by: STUDENT IN AN ORGANIZED HEALTH CARE EDUCATION/TRAINING PROGRAM

## 2020-09-26 PROCEDURE — 86850 RBC ANTIBODY SCREEN: CPT | Performed by: SURGERY

## 2020-09-26 PROCEDURE — 25000132 ZZH RX MED GY IP 250 OP 250 PS 637: Performed by: SURGERY

## 2020-09-26 PROCEDURE — 80048 BASIC METABOLIC PNL TOTAL CA: CPT | Performed by: STUDENT IN AN ORGANIZED HEALTH CARE EDUCATION/TRAINING PROGRAM

## 2020-09-26 PROCEDURE — 83735 ASSAY OF MAGNESIUM: CPT | Performed by: STUDENT IN AN ORGANIZED HEALTH CARE EDUCATION/TRAINING PROGRAM

## 2020-09-26 PROCEDURE — 92526 ORAL FUNCTION THERAPY: CPT | Mod: GN

## 2020-09-26 PROCEDURE — 00000146 ZZHCL STATISTIC GLUCOSE BY METER IP

## 2020-09-26 PROCEDURE — 25000132 ZZH RX MED GY IP 250 OP 250 PS 637: Performed by: STUDENT IN AN ORGANIZED HEALTH CARE EDUCATION/TRAINING PROGRAM

## 2020-09-26 PROCEDURE — 71045 X-RAY EXAM CHEST 1 VIEW: CPT

## 2020-09-26 PROCEDURE — 84100 ASSAY OF PHOSPHORUS: CPT | Performed by: STUDENT IN AN ORGANIZED HEALTH CARE EDUCATION/TRAINING PROGRAM

## 2020-09-26 PROCEDURE — 86900 BLOOD TYPING SEROLOGIC ABO: CPT | Performed by: SURGERY

## 2020-09-26 PROCEDURE — 97530 THERAPEUTIC ACTIVITIES: CPT | Mod: GP

## 2020-09-26 PROCEDURE — 20000004 ZZH R&B ICU UMMC

## 2020-09-26 PROCEDURE — 85027 COMPLETE CBC AUTOMATED: CPT | Performed by: STUDENT IN AN ORGANIZED HEALTH CARE EDUCATION/TRAINING PROGRAM

## 2020-09-26 RX ORDER — WARFARIN SODIUM 4 MG/1
4 TABLET ORAL
Status: COMPLETED | OUTPATIENT
Start: 2020-09-26 | End: 2020-09-26

## 2020-09-26 RX ORDER — NICOTINE POLACRILEX 4 MG
15-30 LOZENGE BUCCAL
Status: DISCONTINUED | OUTPATIENT
Start: 2020-09-26 | End: 2020-10-02 | Stop reason: HOSPADM

## 2020-09-26 RX ORDER — SPIRONOLACTONE 25 MG/1
25 TABLET ORAL DAILY
Status: DISCONTINUED | OUTPATIENT
Start: 2020-09-26 | End: 2020-09-27

## 2020-09-26 RX ORDER — DEXTROSE MONOHYDRATE 25 G/50ML
25-50 INJECTION, SOLUTION INTRAVENOUS
Status: DISCONTINUED | OUTPATIENT
Start: 2020-09-26 | End: 2020-10-02 | Stop reason: HOSPADM

## 2020-09-26 RX ORDER — SIMVASTATIN 20 MG
20 TABLET ORAL AT BEDTIME
Status: DISCONTINUED | OUTPATIENT
Start: 2020-09-26 | End: 2020-10-02 | Stop reason: HOSPADM

## 2020-09-26 RX ORDER — SENNOSIDES 8.6 MG
8.6 TABLET ORAL 2 TIMES DAILY
Status: DISCONTINUED | OUTPATIENT
Start: 2020-09-26 | End: 2020-10-02 | Stop reason: HOSPADM

## 2020-09-26 RX ADMIN — Medication 12.5 MG: at 19:38

## 2020-09-26 RX ADMIN — Medication 12.5 MG: at 08:25

## 2020-09-26 RX ADMIN — POTASSIUM CHLORIDE 10 MEQ: 7.46 INJECTION, SOLUTION INTRAVENOUS at 05:55

## 2020-09-26 RX ADMIN — WARFARIN SODIUM 4 MG: 4 TABLET ORAL at 19:05

## 2020-09-26 RX ADMIN — MUPIROCIN 0.5 G: 20 OINTMENT TOPICAL at 08:27

## 2020-09-26 RX ADMIN — ACETAMINOPHEN 975 MG: 325 TABLET, FILM COATED ORAL at 13:48

## 2020-09-26 RX ADMIN — HEPARIN SODIUM 5000 UNITS: 5000 INJECTION, SOLUTION INTRAVENOUS; SUBCUTANEOUS at 22:20

## 2020-09-26 RX ADMIN — ACETAMINOPHEN 975 MG: 325 TABLET, FILM COATED ORAL at 19:38

## 2020-09-26 RX ADMIN — HEPARIN SODIUM 5000 UNITS: 5000 INJECTION, SOLUTION INTRAVENOUS; SUBCUTANEOUS at 05:55

## 2020-09-26 RX ADMIN — SIMVASTATIN 20 MG: 20 TABLET, FILM COATED ORAL at 22:20

## 2020-09-26 RX ADMIN — SENNOSIDES 8.6 MG: 8.6 TABLET, FILM COATED ORAL at 19:38

## 2020-09-26 RX ADMIN — SPIRONOLACTONE 25 MG: 25 TABLET ORAL at 11:14

## 2020-09-26 RX ADMIN — DORZOLAMIDE HYDROCHLORIDE AND TIMOLOL MALEATE 1 DROP: 20; 5 SOLUTION/ DROPS OPHTHALMIC at 19:38

## 2020-09-26 RX ADMIN — LATANOPROST 1 DROP: 50 SOLUTION OPHTHALMIC at 22:20

## 2020-09-26 RX ADMIN — DORZOLAMIDE HYDROCHLORIDE AND TIMOLOL MALEATE 1 DROP: 20; 5 SOLUTION/ DROPS OPHTHALMIC at 08:27

## 2020-09-26 RX ADMIN — SENNOSIDES 8.6 MG: 8.6 TABLET, FILM COATED ORAL at 11:14

## 2020-09-26 RX ADMIN — ASPIRIN 81 MG CHEWABLE TABLET 81 MG: 81 TABLET CHEWABLE at 08:25

## 2020-09-26 RX ADMIN — FUROSEMIDE 20 MG: 10 INJECTION, SOLUTION INTRAVENOUS at 16:43

## 2020-09-26 RX ADMIN — HEPARIN SODIUM 5000 UNITS: 5000 INJECTION, SOLUTION INTRAVENOUS; SUBCUTANEOUS at 13:48

## 2020-09-26 RX ADMIN — FUROSEMIDE 20 MG: 10 INJECTION, SOLUTION INTRAVENOUS at 08:25

## 2020-09-26 RX ADMIN — ACETAMINOPHEN 975 MG: 325 TABLET, FILM COATED ORAL at 08:25

## 2020-09-26 ASSESSMENT — ACTIVITIES OF DAILY LIVING (ADL)
ADLS_ACUITY_SCORE: 15
ADLS_ACUITY_SCORE: 15
ADLS_ACUITY_SCORE: 16
ADLS_ACUITY_SCORE: 15

## 2020-09-26 ASSESSMENT — MIFFLIN-ST. JEOR: SCORE: 1233.37

## 2020-09-26 ASSESSMENT — PAIN DESCRIPTION - DESCRIPTORS
DESCRIPTORS: SORE
DESCRIPTORS: SORE

## 2020-09-26 NOTE — PLAN OF CARE
Major Shift Events:  awaiting on a bed on 6C. Pt up in the chair most of the day. Pt started out the day with very coarse lung sounds throughout. After being up in the chair most of the day and really working hard on her flutter valve her lungs sound much clearer this evening. Pt using purwic and also using commode to pee. Had a medium BM this evening. Pt had a pretty good appetite, cleared for regular food and this liq by speech. Pt swallows pills with no problem. Pt in very good spirits and impressed with how well she in moving around. MARCELINO removed her onq catheters this afternoon, sites CDI. MAC R internal jugular removed this am.     Plan: transfer to 6C when bed becomes available.    For vital signs and complete assessments, please see documentation flowsheets.

## 2020-09-26 NOTE — PLAN OF CARE
4E PT  Discharge Planner PT   Patient plan for discharge: Open to rehab  Current status: Pt motivated to participate. Demonstrates good adherence to sternal precautions. ModA of 2 required for supine>sit. Sit<>stand and transfer EOB>chair and chair<>commode completed with CGA of 2 and FWW. Improved standing tolerance today, able to take a few steps with CGA of 2 and FWW. VSS on 1L O2 via NC.  Barriers to return to prior living situation: Medical status, post-op precautions, weakness, decreased activity tolerance  Recommendations for discharge: ARC  Rationale for recommendations: Pt below baseline and motivated to return to PLOF. Would benefit from intensive rehab services to maximize safety and independence with functional mobility. Pt would tolerate 3hrs of therapy per day.       Entered by: Hilary Blevins 09/26/2020 9:31 AM

## 2020-09-26 NOTE — PROGRESS NOTES
REGIONAL ANESTHESIA PAIN SERVICE CONTINUOUS NERVE INFUSION NOTE  Monika Bee is a 84 year old female POD #3 s/p C REPLACE AORT VALV,PROSTH VALV [18709] (redo sternotomy, replacement of mechanical aortic valve originally replaced in 2004, and all other associated procedures) and placement of bilateral T4-5 erector spinae (ES) catheters for pain control.    SUBJECTIVE:  Interval History: Overnight no acute events were reported per nursing staff.  Patient continues to report good pain pain control with nerve block continuous infusion and current analgesics (see below).  Denies weakness, paresthesias, circumoral numbness, metallic taste or tinnitus. Patient has been getting up to chair assistance. Chest tubes in place     Clinically Aligned Pain Assessment (CAPA):   Comfort (How is your pain?): Comfortably manageable  Change in Pain (Since your last medication/intervention?): About the same  Pain Control (How are your pain treatments working?):  Fully effective pain control  Functioning (Are you able to do activities to get better?) : Can do most things, but pain gets in the way of some   Sleep (Does your pain management allow you to sleep or rest?): Awake with occasional pain     Pain Intensity using Numerical Rating Scale (NRS):  4/10 at rest and 5/10 with activity      Antithrombotic/Thrombolytic Therapy ordered:  Warfarin started 9/25    Analgesic Medications:   Medications related to Pain Management (From now, onward)    Start     Dose/Rate Route Frequency Ordered Stop    09/24/20 0800  aspirin (ASA) chewable tablet 81 mg      81 mg Oral DAILY 09/23/20 1434      09/24/20 0748  oxyCODONE (ROXICODONE) tablet 5-10 mg      5-10 mg Oral EVERY 4 HOURS PRN 09/24/20 0748      09/23/20 2349  HYDROmorphone (PF) (DILAUDID) injection 0.3-0.5 mg      0.3-0.5 mg Intravenous EVERY 3 HOURS PRN 09/23/20 2349      09/23/20 1500  acetaminophen (TYLENOL) tablet 975 mg      975 mg Oral 3 TIMES DAILY 09/23/20 1435      09/23/20  1433  lidocaine 1 % 0.1-1 mL      0.1-1 mL Other EVERY 1 HOUR PRN 09/23/20 1434      09/23/20 1433  lidocaine (LMX4) kit       Topical EVERY 1 HOUR PRN 09/23/20 1434      09/23/20 0800  ropivacaine 0.2% (NAROPIN) 750 mL in ON-Q C-Bloc select flow ([id number] holds 600-750 mL) dual cath disposable pump      14 mL/hr  Irrigation CONTINUOUS 09/23/20 0742             OBJECTIVE:  Lab results  Recent Labs   Lab Test 09/26/20  0324   WBC 8.8   RBC 2.43*   HGB 8.0*   HCT 25.5*   *   MCH 32.9   MCHC 31.4*   RDW 14.5   *       Lab Results   Component Value Date    INR 1.44 09/26/2020    INR 1.43 09/25/2020    INR 1.38 09/23/2020         Vitals:    Temp:  [36.7  C (98.1  F)-37.7  C (99.9  F)] 36.9  C (98.4  F)  Pulse:  [66-86] 77  Resp:  [18-21] 18  BP: ()/(39-80) 155/60  MAP:  [65 mmHg-99 mmHg] 99 mmHg  Arterial Line BP: (101-153)/(44-66) 153/66  SpO2:  [93 %-99 %] 97 %    Exam:   GEN: alert and no distress  SKIN: bilateral erector spinae (ES) catheter site with dressing c/d/i, no tenderness, erythema, heme, edema      ASSESSMENT/PLAN:    Patient is receiving adequate analgesia with current multimodal therapy including bilateral T4-5 erector spinae (ES) catheters with infusion of Ropivacaine 0.2%  at 14mL/hr, 7mL/hr each catheter.     - continue Ropivacaine 0.2% infusion rate at 14mL/hr, 7mL/hr each catheter, POD #3   - Will plan for catheter removal this AM, timed with heparin  - Bolus administered prior to removal - 0.25%, 5mL per catheter  - antithrombotic/thrombolytic therapy Warfarin given on 9/25. Please contact [facility] (#[phone]) prior to any medication changes  - will continue to follow and adjust as needed    - Follow-up: Catheter removed post Bolus at 1200 without complication. Catheter tip intact.    - discussed plan with attending anesthesiologist    Bassem Contreras MD  Regional Anesthesia Pain Service  9/26/2020 7:17 AM    [facility] Contact Info (24 hour job code pager is the last 4 digits) For in-house use  only:   [place] phone: [place] [phone], [place] [phone], Peds [phone], then enter call-back number.    Text: Use [employer] on the Intranet <Paging/Directory> tab and enter Jobcode ID.   If no call back at any time, contact the hospital  and ask for RAPS attending or backup

## 2020-09-26 NOTE — PROGRESS NOTES
CV ICU PROGRESS NOTE  September 26, 2020   Monika Bee  [id number]  Admitted: 9/23/2020  5:21 AM      CO-MORBIDITIES:   Aortic stenosis  Aortic stenosis  History of aortic valve replacement    ASSESSMENT: Monika Bee is a 84 year old female with history of bicuspid aortic valve with severe aortic stenosis s/p replacement with bioprosthesis and patent foramen ovale s/p repair (2004), HTN, and GERD developed aortic valve restenosis,  s/p redo sternotomy with aortic valve replacement with 19 mm St Romeo [place] mechanical prosthesis on 09/23/20 with Dr. Rainey. Post op echo with mild RV dysfunction . Extubated on 9/24.     TODAY'S PROGRESS:   [facility], pull ES catheters   Lasix 20 mg BID   Remove CVC   BR  Statin, ASA   PTA spiranolactone   To the floor      Neuro/ pain/ sedation:  # Acute post-operative pain  - Monitor neurological status. Notify the MD for any acute changes in exam.  - Domenic tylenol   - PRN oxycodone   - ES catheters 09/23/20 by RAPs team      Pulmonary care:   - Extubated 9/24   - Pulmonary hygiene    Cardiovascular:    # HLD   # HTN   # Bicuspid aortic valve with severe aortic stenosis s/p replacement with St Romeo mechanical prosthesis and patent foramen ovale s/p repair (2004)  # S/p redo sternotomy with aortic valve replacement with 19 mm St Romeo [place] mechanical prosthesis.   - Monitor hemodynamic status.   - MAP goal > 65  - ASA 81  - PTA simvastatin 20 mg   - PTA spiranolactone 25 mg daily  - PTA metoprolol 50 mg BID - hold. Have started metoprolol 12.5 BID.         GI /Nutrition:   - Speech recommending regular diet, thin liquids    - Bowel regimen with senna BID, miralax daily  - Pantoprazole      Fluids/ Electrolytes/ Renal:   - Lasix 20   - No goodwin      Endocrine:    # Stress hyperglycemia  - Sliding scale (medium)      ID/ Antibiotics:  # Aortic valve   - Perioperative antibiotics with ancef and vancomycin 09/23/20   - No current antibiotics      Heme:     # Acute blood loss  anemia  - Hgb goal >7  - PTA warfarin (no bridge). INR 1.44.   - CBC and coags      Prophylaxis:    - SCDs  - Bowel regimen  - PPI  - Hold heparin. SubQ heparin and coumadin.      Lines/ tubes/ drains:  - 2 Pleural CT (R and L, 28 Fr) 09/23/20   - 2 Med CT 32 Fr 09/23/20   - PIV      Disposition:  - To the floor      Discussed with Dr. Kt Owens    ====================================    TODAY'S PROGRESS:   SUBJECTIVE:   Speech advanced to reg diet, thin liquids. Up in chair. Working with IS and flutter valve. Doing well.     OBJECTIVE:   1. VITAL SIGNS:   Temp:  [98.1  F (36.7  C)-99.1  F (37.3  C)] 98.2  F (36.8  C)  Pulse:  [66-86] 80  Resp:  [18] 18  BP: ()/(39-80) 133/54  MAP:  [65 mmHg-99 mmHg] 99 mmHg  Arterial Line BP: (101-153)/(44-66) 153/66  SpO2:  [93 %-99 %] 94 %  Resp: 18      2. INTAKE/ OUTPUT:   I/O last 3 completed shifts:  In: 862 [P.O.:720; I.V.:142]  Out: 856 [Urine:426; Chest Tube:430]    3. PHYSICAL EXAMINATION:   General:  Appears comfortable   CV: SR, not paced   Resp: On RA, CT dressing intact, dry    Abd: Soft, not distended, no rigidity  Ext: Warm and perfused   Skin: No issues at present     4. INVESTIGATIONS:   Arterial Blood Gases   Recent Labs   Lab 09/24/20  0643 09/24/20  0401 09/23/20  2221 09/23/20  1718   PH 7.38 7.38 7.42 7.39   PCO2 36 41 32* 40   PO2 88 84 101 108*   HCO3 21 24 21 24     Complete Blood Count   Recent Labs   Lab 09/26/20  0324 09/25/20  0324 09/24/20  0401 09/23/20  2221 09/23/20  1456   WBC 8.8 9.5 14.3*  --  17.7*   HGB 8.0* 8.4* 9.8* 10.4* 11.8   * 119* 187  --  212     Basic Metabolic Panel  Recent Labs   Lab 09/26/20  0324 09/25/20  0324 09/24/20  1815 09/24/20  1729  09/24/20  0401 09/23/20  1456   * 144  --   --   --  145* 142   POTASSIUM 3.6 4.0 4.4 2.6*   < > 3.6 4.0   CHLORIDE 113* 115*  --   --   --  113* 113*   CO2 28 26  --   --   --  23 24   BUN 28 20  --   --   --  17 13   CR 1.07* 1.01  --   --   --  0.99  0.73   * 102*  --   --   --  140* 158*    < > = values in this interval not displayed.     Liver Function Tests  Recent Labs   Lab 09/26/20 0324 09/25/20 0324 09/24/20  0401 09/23/20  1456 09/23/20 1300 09/21/20  1259   AST  --   --  41 Canceled, Test credited  --   --  41   ALT  --   --  15 23  --   --  22   ALKPHOS  --   --  50 54  --   --  74   BILITOTAL  --   --  1.1 1.2  --   --  0.8   ALBUMIN  --   --  3.4 2.5*  --   --  3.7   INR 1.44* 1.43*  --  1.38* 1.59*   < > 1.54*    < > = values in this interval not displayed.     Pancreatic Enzymes  No lab results found in last 7 days.  Coagulation Profile  Recent Labs   Lab 09/26/20 0324 09/25/20 0324 09/23/20 1456 09/23/20 1300 09/21/20  1259   INR 1.44* 1.43* 1.38* 1.59*   < > 1.54*   PTT  --   --  32 36  --  33    < > = values in this interval not displayed.     Lactate  Invalid input(s): LACTATE    5. RADIOLOGY:   No results found for this or any previous visit (from the past 24 hour(s)).=========================================

## 2020-09-26 NOTE — PLAN OF CARE
Shift [id number]    Updates: A&Ox4, VSS. NSR 70-80's. Scheduled tylenol for pain with oncu blocks in place. Remains on 1L NC. LS very coarse, semi productive congested cough. Using yanker. CT x4 with minimal output. Lasix given for low UOP. Minimal results. Team aware. CVP line set up; CVP 12-13, team updated.    Plan: Transfer patient to floor when bed available. Continue plan of care.

## 2020-09-26 NOTE — PLAN OF CARE
Discharge Planner SLP   Patient plan for discharge: not discussed  Current status: The patient was seen for dysphagia tx while up in the chair this AM. The patient continues to experience baseline cough, which was unchanged with all PO presentations during this session. RN/pt deny obvious relation to swallow. Voice noted to be clear and strong throughout trials and swallow response is timely, so suspicion for relation to swallow is low. Recommend diet upgrade to regular textures and thin liquids with aspiration precautions (sit upright, pace slowly, small bites/sips). SLP will f/u for ongoing education and diet tolerance. Should patient present increased concern for dysphagia, videoswallow study may be indicated which SLP will recommend as appropriate.  Barriers to return to prior living situation: medical status, deconditioning  Recommendations for discharge: defer to OT and PT  Rationale for recommendations: benefits from SLP tx for dysphagia to ensure safe swallowing, but it is expected pt may reach SLP goals prior to discharge       Entered by: Bebe Avila 09/26/2020 12:24 PM

## 2020-09-26 NOTE — PROGRESS NOTES
HPI  Monika Bee is a 85 y/o female who presents for evaluation of prosthetic valve stenosis.  She had a mechanical aortic valve replacement about 20 years ago. 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 EF remains normal at 65. She also has moderate mitral stenosis. She has had a syncopal episode and has an implantable loop recorder in. She had another syncopal episode in early February, but her loop recorder interrogation on 2/20/2020 showed no obvious cardiac rhythm responsible for syncope. She had previous thoracotomy for aortic valve replacement. She does have some dyspnea on exertion, but she finds that not to be unusual. She now presents for the above procedure.     PMH is also significant for s/p right knee arthroplasty and hx left femur fracture s/p surgical repair.     History was obtained from patient & chart review.      Past Medical History  Past Medical History        Past Medical History:   Diagnosis Date     Aortic aneurysm (H) 9/19/2012     Mild dilation of the ascending aorta measuring 3.8cm. Mild dilation of the ascending aorta measuring 3.8cm.     Aortic stenosis 8/19/2020     Added automatically from request for surgery [id number]     Aortic valve disorder 7/21/2020     A. S/p aortic valve replacement 2004 with 21mm [facility] Romeo B. Last echocardiogram 2006 demonstrating armida functioning valve -- mean gradient of 16mmHg. Normal LV size and function, no other significant valve disease. A. S/p aortic valve replacement 2004 with 21mm [facility]. Romeo B. Last echocardiogram 2006 demonstrating armida functioning valve -- mean gradient of 16mmHg. Normal LV size and function, no oth     Dyslipidemia 12/4/2013     Early dry stage nonexudative age-related macular degeneration of both eyes 1/13/2020     Hypertension, benign essential, goal below 140/90 12/4/2013     Long term current use of anticoagulant therapy 7/16/2009     Osteoarthrosis involving lower leg  7/21/2020     Ostium secundum type atrial septal defect 9/19/2012     Closed during aortic valve replacement surgically in 2004 Closed during aortic valve replacement surgically in 2004     PFO (patent foramen ovale) 9/19/2012     Closed during aortic valve replacement surgically in 2004 Closed during aortic valve replacement surgically in 2004     Vitamin D deficiency 1/18/2013           Past Surgical History  Past Surgical History         Past Surgical History:   Procedure Laterality Date     AS TOTAL KNEE ARTHROPLASTY Right 2015     CARPAL TUNNEL RELEASE RT/LT         CATARACT IOL, RT/LT         FEMUR SURGERY Left       fracture repair     REPAIR VALVE AORTIC          Prior to Admission Medications  Active Medications          Current Outpatient Medications   Medication Sig Dispense Refill     calcium carbonate 600 mg-vitamin D 400 units (CALTRATE) 600-400 MG-UNIT per tablet Take 2 tablets by mouth daily         carboxymethylcellulose PF (REFRESH PLUS) 0.5 % ophthalmic solution Place 1 drop into both eyes 3 times daily as needed for dry eyes         celecoxib (CELEBREX) 100 MG capsule Take 100 mg by mouth daily (with breakfast)          dorzolamide-timolol (COSOPT) 2-0.5 % ophthalmic solution Place 1 drop into both eyes 2 times daily          latanoprost (XALATAN) 0.005 % ophthalmic solution Place 1 drop into both eyes At Bedtime          Methylcellulose, Laxative, (CITRUCEL PO) Take 2 capsules by mouth daily         metoprolol tartrate (LOPRESSOR) 50 MG tablet TAKE 1 TABLET BY MOUTH TWICE A DAY         Multiple Vitamins-Minerals (OCUVITE ADULT FORMULA PO) Take 1 tablet by mouth daily          multivitamin (CENTRUM SILVER) tablet Take 1 tablet by mouth daily         simvastatin (ZOCOR) 20 MG tablet Take 20 mg by mouth At Bedtime          spironolactone (ALDACTONE) 25 MG tablet TAKE 1 TABLET(25 MG) BY MOUTH EVERY DAY IN THE MORNING         warfarin ANTICOAGULANT (COUMADIN) 4 MG tablet Take 2 mg (1/2 tablet) on  Fridays and 4 mg by mouth on all other days of the week.  Takes at 4:30 PM.               Allergies  No Known Allergies     Social History  Social History   Social History            Socioeconomic History     Marital status: Single       Spouse name: Not on file     Number of children: Not on file     Years of education: Not on file     Highest education level: Not on file   Occupational History     Not on file   Social Needs     Financial resource strain: Not on file     Food insecurity       Worry: Not on file       Inability: Not on file     Transportation needs       Medical: Not on file       Non-medical: Not on file   Tobacco Use     Smoking status: Never Smoker     Smokeless tobacco: Never Used   Substance and Sexual Activity     Alcohol use: Yes       Alcohol/week: 6.0 standard drinks       Types: 6 Glasses of wine per week       Frequency: 2-4 times a month       Drinks per session: 1 or 2       Binge frequency: Weekly     Drug use: Not on file     Sexual activity: Not on file   Lifestyle     Physical activity       Days per week: Not on file       Minutes per session: Not on file     Stress: Not on file   Relationships     Social connections       Talks on phone: Not on file       Gets together: Not on file       Attends [employer] service: Not on file       Active member of club or organization: Not on file       Attends meetings of clubs or organizations: Not on file       Relationship status: Not on file     Intimate partner violence       Fear of current or ex partner: Not on file       Emotionally abused: Not on file       Physically abused: Not on file       Forced sexual activity: Not on file   Other Topics Concern     Not on file   Social History Narrative     Not on file           Family History  Family History      Reviewed    ROS  10 point review of systems is negative other than noted in the HPI or here.        Physical Exam    BP: 130/76 Pulse: 91   Resp: 14 SpO2: 99 %          176 lbs 0 oz  4'  "11.5\"   Body mass index is 34.95 kg/m .  Constitutional: Awake, alert, cooperative, not in distress  Eyes: Pupils equal, round and reactive to light, extra ocular muscles intact, sclera clear, conjunctiva normal.  HENT: Normocephalic, oral pharynx with moist mucus membranes, good dentition. No goiter appreciated. No removable dental hardware.  Respiratory: Clear to auscultation bilaterally, no crackles or wheezing. No SOB when supine.  Cardiovascular: Regular rate and rhythm, normal S1 and S2, and 2/6 murmur noted.  Carotids +2, no bruits. No edema. Palpable pulses to radial, DP and PT arteries. Sternotomy scar well healed.  GI: Normal bowel sounds, soft, obese, non-tender   Skin: Warm and dry.  No rashes.   Musculoskeletal: mildly limited extension ROM of neck. There is no redness, warmth, or swelling of the joints. Gross motor strength is normal.    Neurologic: no focal deficits  Neuropsychiatric: Calm, cooperative. Normal affect.     ECHOCARDIOGRAM 7/29/20  CONCLUSION:  1. Normal left ventricular size and function.  Ejection fraction is 65%.  2. Mechanical aortic valve prosthesis with functional moderate stenosis and trivial regurgitation.  3. Mild mitral stenosis.   4. No evidence of intracardiac shunt, thrombus or vegetation.     HEART CATH 7/29/20  Conclusions    1. Normal coronary arteries.    2. Malfunctioning mechanical aortic valve with the leaflets not fully  opening during systole.  Recommendations    * Cardiothoracic surgical consultation.  Diagnostic Summary    * Left main is normal.    * Left anterior descending artery had mild luminal irregularities.    * Left circumflex is nondominant and normal.    * The right coronary artery is the dominant vessel with mild luminal  irregularities.    * On fluoroscopy, aortic valve leaflets do open and close.  However, the  leaflets do not fully open suggesting likely scar tissue present around the  mechanical aortic valve.    ASSESSMENT and PLAN  Monika BLANCA" Cristal is a 84 year old female with prosthetic aortic valve stenosis with symptoms. I agree with the plan for redo AVR. I discussed the risks and benefits of surgery including risks of death, bleeding, stroke, infection, renal failure and pacemaker. She understands and is willing to proceed with surgery. I have told her that we will try to place a tissue valve but for anatomic reasons, a mechanical valve may be used. She understands this and is willing to proceed with surgery.

## 2020-09-27 ENCOUNTER — APPOINTMENT (OUTPATIENT)
Dept: SPEECH THERAPY | Facility: CLINIC | Age: 85
DRG: 228 | End: 2020-09-27
Attending: SURGERY
Payer: COMMERCIAL

## 2020-09-27 ENCOUNTER — APPOINTMENT (OUTPATIENT)
Dept: GENERAL RADIOLOGY | Facility: CLINIC | Age: 85
DRG: 228 | End: 2020-09-27
Attending: SURGERY
Payer: COMMERCIAL

## 2020-09-27 LAB
ANION GAP SERPL CALCULATED.3IONS-SCNC: 6 MMOL/L (ref 3–14)
BUN SERPL-MCNC: 30 MG/DL (ref 7–30)
CALCIUM SERPL-MCNC: 8.3 MG/DL (ref 8.5–10.1)
CHLORIDE SERPL-SCNC: 111 MMOL/L (ref 94–109)
CO2 SERPL-SCNC: 26 MMOL/L (ref 20–32)
CREAT SERPL-MCNC: 1.02 MG/DL (ref 0.52–1.04)
ERYTHROCYTE [DISTWIDTH] IN BLOOD BY AUTOMATED COUNT: 13.9 % (ref 10–15)
GFR SERPL CREATININE-BSD FRML MDRD: 51 ML/MIN/{1.73_M2}
GLUCOSE BLDC GLUCOMTR-MCNC: 103 MG/DL (ref 70–99)
GLUCOSE BLDC GLUCOMTR-MCNC: 121 MG/DL (ref 70–99)
GLUCOSE BLDC GLUCOMTR-MCNC: 83 MG/DL (ref 70–99)
GLUCOSE BLDC GLUCOMTR-MCNC: 87 MG/DL (ref 70–99)
GLUCOSE SERPL-MCNC: 93 MG/DL (ref 70–99)
HCT VFR BLD AUTO: 28.1 % (ref 35–47)
HGB BLD-MCNC: 8.6 G/DL (ref 11.7–15.7)
INR PPP: 1.67 (ref 0.86–1.14)
MCH RBC QN AUTO: 32.1 PG (ref 26.5–33)
MCHC RBC AUTO-ENTMCNC: 30.6 G/DL (ref 31.5–36.5)
MCV RBC AUTO: 105 FL (ref 78–100)
PLATELET # BLD AUTO: 192 10E9/L (ref 150–450)
POTASSIUM SERPL-SCNC: 3.5 MMOL/L (ref 3.4–5.3)
RBC # BLD AUTO: 2.68 10E12/L (ref 3.8–5.2)
SODIUM SERPL-SCNC: 143 MMOL/L (ref 133–144)
WBC # BLD AUTO: 8.1 10E9/L (ref 4–11)

## 2020-09-27 PROCEDURE — 36415 COLL VENOUS BLD VENIPUNCTURE: CPT | Performed by: STUDENT IN AN ORGANIZED HEALTH CARE EDUCATION/TRAINING PROGRAM

## 2020-09-27 PROCEDURE — 21400000 ZZH R&B CCU UMMC

## 2020-09-27 PROCEDURE — 00000146 ZZHCL STATISTIC GLUCOSE BY METER IP

## 2020-09-27 PROCEDURE — 85027 COMPLETE CBC AUTOMATED: CPT | Performed by: SURGERY

## 2020-09-27 PROCEDURE — 25000132 ZZH RX MED GY IP 250 OP 250 PS 637: Performed by: STUDENT IN AN ORGANIZED HEALTH CARE EDUCATION/TRAINING PROGRAM

## 2020-09-27 PROCEDURE — 71045 X-RAY EXAM CHEST 1 VIEW: CPT

## 2020-09-27 PROCEDURE — 85049 AUTOMATED PLATELET COUNT: CPT | Performed by: STUDENT IN AN ORGANIZED HEALTH CARE EDUCATION/TRAINING PROGRAM

## 2020-09-27 PROCEDURE — 80048 BASIC METABOLIC PNL TOTAL CA: CPT | Performed by: SURGERY

## 2020-09-27 PROCEDURE — 25000128 H RX IP 250 OP 636: Performed by: STUDENT IN AN ORGANIZED HEALTH CARE EDUCATION/TRAINING PROGRAM

## 2020-09-27 PROCEDURE — 25000132 ZZH RX MED GY IP 250 OP 250 PS 637: Performed by: SURGERY

## 2020-09-27 PROCEDURE — 85610 PROTHROMBIN TIME: CPT | Performed by: STUDENT IN AN ORGANIZED HEALTH CARE EDUCATION/TRAINING PROGRAM

## 2020-09-27 PROCEDURE — 92526 ORAL FUNCTION THERAPY: CPT | Mod: GN

## 2020-09-27 RX ORDER — METOPROLOL TARTRATE 25 MG/1
25 TABLET, FILM COATED ORAL 2 TIMES DAILY
Status: DISCONTINUED | OUTPATIENT
Start: 2020-09-27 | End: 2020-10-02 | Stop reason: HOSPADM

## 2020-09-27 RX ORDER — WARFARIN SODIUM 4 MG/1
4 TABLET ORAL
Status: COMPLETED | OUTPATIENT
Start: 2020-09-27 | End: 2020-09-27

## 2020-09-27 RX ADMIN — Medication 12.5 MG: at 07:36

## 2020-09-27 RX ADMIN — ACETAMINOPHEN 975 MG: 325 TABLET, FILM COATED ORAL at 15:03

## 2020-09-27 RX ADMIN — LATANOPROST 1 DROP: 50 SOLUTION OPHTHALMIC at 19:43

## 2020-09-27 RX ADMIN — WARFARIN SODIUM 4 MG: 4 TABLET ORAL at 18:17

## 2020-09-27 RX ADMIN — DORZOLAMIDE HYDROCHLORIDE AND TIMOLOL MALEATE 1 DROP: 20; 5 SOLUTION/ DROPS OPHTHALMIC at 19:43

## 2020-09-27 RX ADMIN — FUROSEMIDE 20 MG: 10 INJECTION, SOLUTION INTRAVENOUS at 07:36

## 2020-09-27 RX ADMIN — HEPARIN SODIUM 5000 UNITS: 5000 INJECTION, SOLUTION INTRAVENOUS; SUBCUTANEOUS at 21:46

## 2020-09-27 RX ADMIN — DORZOLAMIDE HYDROCHLORIDE AND TIMOLOL MALEATE 1 DROP: 20; 5 SOLUTION/ DROPS OPHTHALMIC at 07:37

## 2020-09-27 RX ADMIN — POTASSIUM CHLORIDE 20 MEQ: 1.5 POWDER, FOR SOLUTION ORAL at 08:22

## 2020-09-27 RX ADMIN — SPIRONOLACTONE 25 MG: 25 TABLET ORAL at 07:37

## 2020-09-27 RX ADMIN — ACETAMINOPHEN 975 MG: 325 TABLET, FILM COATED ORAL at 19:42

## 2020-09-27 RX ADMIN — ASPIRIN 81 MG CHEWABLE TABLET 81 MG: 81 TABLET CHEWABLE at 07:36

## 2020-09-27 RX ADMIN — HEPARIN SODIUM 5000 UNITS: 5000 INJECTION, SOLUTION INTRAVENOUS; SUBCUTANEOUS at 15:03

## 2020-09-27 RX ADMIN — HEPARIN SODIUM 5000 UNITS: 5000 INJECTION, SOLUTION INTRAVENOUS; SUBCUTANEOUS at 05:58

## 2020-09-27 RX ADMIN — METOPROLOL TARTRATE 25 MG: 25 TABLET, FILM COATED ORAL at 19:42

## 2020-09-27 RX ADMIN — SIMVASTATIN 20 MG: 20 TABLET, FILM COATED ORAL at 21:46

## 2020-09-27 RX ADMIN — FUROSEMIDE 20 MG: 10 INJECTION, SOLUTION INTRAVENOUS at 15:03

## 2020-09-27 RX ADMIN — ACETAMINOPHEN 975 MG: 325 TABLET, FILM COATED ORAL at 07:36

## 2020-09-27 ASSESSMENT — ACTIVITIES OF DAILY LIVING (ADL)
ADLS_ACUITY_SCORE: 16

## 2020-09-27 ASSESSMENT — MIFFLIN-ST. JEOR: SCORE: 1234.37

## 2020-09-27 NOTE — PLAN OF CARE
Discharge Planner SLP   Patient plan for discharge: not stated  Current status: The patient was seen for dysphagia tx this AM with good progress. Coughing episodes appear to be decreasing. No overt signs/symptoms of aspiration present during breakfast meal with regular textures or thin liquids. Pt sat up in the chair and used safe swallow techniques, such as slow pacing and small bites/sips reliably. SLP will f/u to ensure ongoing safe swallowing with expectation pt will reach swallow goals this week.   Barriers to return to prior living situation: weakness, medical status  Recommendations for discharge: defer to OT and PT  Rationale for recommendations: benefits from SLP f/u for dysphagia to ensure safe swallowing, but pt is progressing well and likely won't need SLP intervention after hospital discharge       Entered by: Bebe Avila 09/27/2020 12:43 PM

## 2020-09-27 NOTE — PROGRESS NOTES
Report given to MOLLY Stevens on 6C. Pt transferred to floor on monitor via bed with chaparrita cheatham RN. All belongings, meds and chart sent with pt.

## 2020-09-27 NOTE — PLAN OF CARE
Shift Updates [id number]    No acute changes overnight. VSS. CT remain in place with minimal output. Airleak noted in mediastinal chest tubes, team notified, continue to monitor. UOP remains marginal. Voiding via purewick. See flowsheet for further assessment and documentation. Plan to transfer to floor when bed available. Possibly remove chest tubes today.

## 2020-09-27 NOTE — PROGRESS NOTES
CV ICU PROGRESS NOTE  September 27, 2020   Monika Bee  [id number]  Admitted: 9/23/2020  5:21 AM      ASSESSMENT: Monika Bee is a 84 year old female with history of bicuspid aortic valve with severe aortic stenosis s/p replacement with bioprosthesis and patent foramen ovale s/p repair (2004), HTN, and GERD developed aortic valve restenosis,  s/p redo sternotomy with aortic valve replacement with 19 mm St Romeo [place] mechanical prosthesis on 09/23/20 with Dr. Rainey. Post op echo with mild RV dysfunction . Extubated on 9/24.     TODAY'S PROGRESS:   Metoprolol 25 BID   discontinue spirolactone   Med CT removal  To the floor    Neuro/ pain/ sedation:  # Acute post-operative pain  - Monitor neurological status. Notify the MD for any acute changes in exam.  - Domenic tylenol   - PRN oxycodone   - ES catheters 09/23/20 by RAPs team      Pulmonary care:   - Extubated 9/24   - Pulmonary hygiene    Cardiovascular:    # HLD   # HTN   # Bicuspid aortic valve with severe aortic stenosis s/p replacement with St Romeo mechanical prosthesis and patent foramen ovale s/p repair (2004)  # S/p redo sternotomy with aortic valve replacement with 19 mm St Romeo [place] mechanical prosthesis.   - Monitor hemodynamic status.   - MAP goal > 65  - ASA 81  - PTA simvastatin 20 mg   - PTA spiranolactone 25 mg daily- hold   - PTA metoprolol 50 mg BID - hold. metoprolol 12.5 increase 25 BID.      GI /Nutrition:   - Speech recommending regular diet, thin liquids    - Bowel regimen with senna BID, miralax daily     Fluids/ Electrolytes/ Renal:   - Lasix 20 BID  - No goodwin      Endocrine:    # Stress hyperglycemia  - Sliding scale (medium)      ID/ Antibiotics:  # Aortic valve   - Perioperative antibiotics with ancef and vancomycin 09/23/20   - No current antibiotics      Heme:     # Acute blood loss anemia  - Hgb goal >7  - PTA warfarin (no bridge). INR today 1.67  - CBC and coags      Prophylaxis:    - SCDs  - Bowel regimen  -  SubQ  heparin and coumadin.      Lines/ tubes/ drains:  - 2 Pleural CT (R and L, 28 Fr) 09/23/20   - 2 Med CT 32 Fr 09/23/20 - possible removal today   - PIV      Disposition:  - To the floor      Discussed with CV[facility]      Daniele Rainey MD  ascom [id number]    ====================================    TODAY'S PROGRESS:   SUBJECTIVE:   No acute changes overnight    OBJECTIVE:   1. VITAL SIGNS:   Temp:  [97.4  F (36.3  C)-97.9  F (36.6  C)] 97.8  F (36.6  C)  Pulse:  [76-91] 91  Resp:  [16-18] 16  BP: (111-150)/(48-79) 149/62  SpO2:  [94 %-98 %] 95 %  Resp: 16      2. INTAKE/ OUTPUT:   I/O last 3 completed shifts:  In: 1089 [P.O.:1080; I.V.:9]  Out: 1035 [Urine:725; Chest Tube:310]    3. PHYSICAL EXAMINATION:   General:  Appears comfortable   CV: SR, not paced   Resp: On RA, CT dressing intact, dry    Abd: Soft, not distended, no rigidity  Ext: Warm and perfused   Skin: No issues at present     4. INVESTIGATIONS:   Arterial Blood Gases   Recent Labs   Lab 09/24/20  0643 09/24/20  0401 09/23/20  2221 09/23/20  1718   PH 7.38 7.38 7.42 7.39   PCO2 36 41 32* 40   PO2 88 84 101 108*   HCO3 21 24 21 24     Complete Blood Count   Recent Labs   Lab 09/27/20  0733 09/26/20  0324 09/25/20  0324 09/24/20  0401   WBC 8.1 8.8 9.5 14.3*   HGB 8.6* 8.0* 8.4* 9.8*    136* 119* 187     Basic Metabolic Panel  Recent Labs   Lab 09/27/20  0733 09/26/20  0324 09/25/20  0324 09/24/20  1815  09/24/20  0401    145* 144  --   --  145*   POTASSIUM 3.5 3.6 4.0 4.4   < > 3.6   CHLORIDE 111* 113* 115*  --   --  113*   CO2 26 28 26  --   --  23   BUN 30 28 20  --   --  17   CR 1.02 1.07* 1.01  --   --  0.99   GLC 93 100* 102*  --   --  140*    < > = values in this interval not displayed.     Liver Function Tests  Recent Labs   Lab 09/27/20  0733 09/26/20  0324 09/25/20  0324 09/24/20  0401 09/23/20  1456  09/21/20  1259   AST  --   --   --  41 Canceled, Test credited  --  41   ALT  --   --   --  15 23  --  22   ALKPHOS  --   --   --  50 54  --  74    BILITOTAL  --   --   --  1.1 1.2  --  0.8   ALBUMIN  --   --   --  3.4 2.5*  --  3.7   INR 1.67* 1.44* 1.43*  --  1.38*   < > 1.54*    < > = values in this interval not displayed.     Pancreatic Enzymes  No lab results found in last 7 days.  Coagulation Profile  Recent Labs   Lab 09/27/20  0733 09/26/20  0324 09/25/20  0324 09/23/20  1456 09/23/20  1300  09/21/20  1259   INR 1.67* 1.44* 1.43* 1.38* 1.59*   < > 1.54*   PTT  --   --   --  32 36  --  33    < > = values in this interval not displayed.     Lactate  Invalid input(s): LACTATE    5. RADIOLOGY:   Recent Results (from the past 24 hour(s))   XR Chest Port 1 View    Narrative    EXAM: XR CHEST PORT 1 VW 9/27/2020 4:05 AM    HISTORY: Chest tubes interval change    COMPARISON: 9/26/2020    FINDINGS: Portable AP view of the chest. Postsurgical changes of the  chest with intact median sternotomy wires and aortic valve prosthesis.  Interval removal of a right IJ central venous catheter. Stable  position of the mediastinal drains and the bilateral chest tubes.  Midline trachea. Cardiomediastinal silhouette is stable. Unchanged  retrocardiac atelectasis. Small left pleural effusion. No  pneumothorax. Upper abdomen is unremarkable.      Impression    IMPRESSION:  1. Removal of the right IJ central venous catheter. Otherwise stable  support devices.  2. Small left pleural effusion with associated retrocardiac  atelectasis. No new airspace opacity.    I have personally reviewed the examination and initial interpretation  and I agree with the findings.    LYLE HANDLEY MD   =========================================

## 2020-09-27 NOTE — PLAN OF CARE
Transfer  Transferred from:   Via:bed  Reason for transfer:Pt appropriate for 6C- improved patient condition  Family: Aware of transfer  Belongings: Sent with pt  Chart: Sent with pt  Medications: Meds from bin sent with pt  Report called from: MOLLY Wetzel  2 person skin check completed with Gavin JAIMES RN

## 2020-09-28 ENCOUNTER — APPOINTMENT (OUTPATIENT)
Dept: GENERAL RADIOLOGY | Facility: CLINIC | Age: 85
DRG: 228 | End: 2020-09-28
Attending: SURGERY
Payer: COMMERCIAL

## 2020-09-28 ENCOUNTER — APPOINTMENT (OUTPATIENT)
Dept: SPEECH THERAPY | Facility: CLINIC | Age: 85
DRG: 228 | End: 2020-09-28
Attending: SURGERY
Payer: COMMERCIAL

## 2020-09-28 ENCOUNTER — APPOINTMENT (OUTPATIENT)
Dept: PHYSICAL THERAPY | Facility: CLINIC | Age: 85
DRG: 228 | End: 2020-09-28
Attending: SURGERY
Payer: COMMERCIAL

## 2020-09-28 LAB
ANION GAP SERPL CALCULATED.3IONS-SCNC: 7 MMOL/L (ref 3–14)
BUN SERPL-MCNC: 26 MG/DL (ref 7–30)
CALCIUM SERPL-MCNC: 8.3 MG/DL (ref 8.5–10.1)
CHLORIDE SERPL-SCNC: 111 MMOL/L (ref 94–109)
CO2 SERPL-SCNC: 25 MMOL/L (ref 20–32)
COPATH REPORT: NORMAL
CREAT SERPL-MCNC: 0.97 MG/DL (ref 0.52–1.04)
ERYTHROCYTE [DISTWIDTH] IN BLOOD BY AUTOMATED COUNT: 13.7 % (ref 10–15)
GFR SERPL CREATININE-BSD FRML MDRD: 54 ML/MIN/{1.73_M2}
GLUCOSE BLDC GLUCOMTR-MCNC: 112 MG/DL (ref 70–99)
GLUCOSE SERPL-MCNC: 91 MG/DL (ref 70–99)
HCT VFR BLD AUTO: 27.8 % (ref 35–47)
HGB BLD-MCNC: 8.3 G/DL (ref 11.7–15.7)
INR PPP: 1.91 (ref 0.86–1.14)
INTERPRETATION ECG - MUSE: NORMAL
MAGNESIUM SERPL-MCNC: 2.1 MG/DL (ref 1.6–2.3)
MCH RBC QN AUTO: 31.7 PG (ref 26.5–33)
MCHC RBC AUTO-ENTMCNC: 29.9 G/DL (ref 31.5–36.5)
MCV RBC AUTO: 106 FL (ref 78–100)
PLATELET # BLD AUTO: 230 10E9/L (ref 150–450)
POTASSIUM SERPL-SCNC: 3.5 MMOL/L (ref 3.4–5.3)
RBC # BLD AUTO: 2.62 10E12/L (ref 3.8–5.2)
SODIUM SERPL-SCNC: 143 MMOL/L (ref 133–144)
WBC # BLD AUTO: 7.1 10E9/L (ref 4–11)

## 2020-09-28 PROCEDURE — 85027 COMPLETE CBC AUTOMATED: CPT | Performed by: SURGERY

## 2020-09-28 PROCEDURE — 25000128 H RX IP 250 OP 636: Performed by: STUDENT IN AN ORGANIZED HEALTH CARE EDUCATION/TRAINING PROGRAM

## 2020-09-28 PROCEDURE — 80048 BASIC METABOLIC PNL TOTAL CA: CPT | Performed by: PHYSICIAN ASSISTANT

## 2020-09-28 PROCEDURE — 25000132 ZZH RX MED GY IP 250 OP 250 PS 637: Performed by: PHYSICIAN ASSISTANT

## 2020-09-28 PROCEDURE — 71045 X-RAY EXAM CHEST 1 VIEW: CPT

## 2020-09-28 PROCEDURE — 83735 ASSAY OF MAGNESIUM: CPT | Performed by: SURGERY

## 2020-09-28 PROCEDURE — G0463 HOSPITAL OUTPT CLINIC VISIT: HCPCS

## 2020-09-28 PROCEDURE — 25000132 ZZH RX MED GY IP 250 OP 250 PS 637: Performed by: SURGERY

## 2020-09-28 PROCEDURE — 25000128 H RX IP 250 OP 636: Performed by: PHYSICIAN ASSISTANT

## 2020-09-28 PROCEDURE — 85610 PROTHROMBIN TIME: CPT | Performed by: SURGERY

## 2020-09-28 PROCEDURE — 93010 ELECTROCARDIOGRAM REPORT: CPT | Performed by: INTERNAL MEDICINE

## 2020-09-28 PROCEDURE — 25000132 ZZH RX MED GY IP 250 OP 250 PS 637: Performed by: STUDENT IN AN ORGANIZED HEALTH CARE EDUCATION/TRAINING PROGRAM

## 2020-09-28 PROCEDURE — 92526 ORAL FUNCTION THERAPY: CPT | Mod: GN | Performed by: SPEECH-LANGUAGE PATHOLOGIST

## 2020-09-28 PROCEDURE — 97116 GAIT TRAINING THERAPY: CPT | Mod: GP | Performed by: REHABILITATION PRACTITIONER

## 2020-09-28 PROCEDURE — 93005 ELECTROCARDIOGRAM TRACING: CPT

## 2020-09-28 PROCEDURE — 00000146 ZZHCL STATISTIC GLUCOSE BY METER IP

## 2020-09-28 PROCEDURE — 80048 BASIC METABOLIC PNL TOTAL CA: CPT | Performed by: SURGERY

## 2020-09-28 PROCEDURE — 97530 THERAPEUTIC ACTIVITIES: CPT | Mod: GP | Performed by: REHABILITATION PRACTITIONER

## 2020-09-28 PROCEDURE — 21400000 ZZH R&B CCU UMMC

## 2020-09-28 PROCEDURE — 36415 COLL VENOUS BLD VENIPUNCTURE: CPT | Performed by: SURGERY

## 2020-09-28 RX ORDER — FUROSEMIDE 10 MG/ML
20 INJECTION INTRAMUSCULAR; INTRAVENOUS
Status: DISCONTINUED | OUTPATIENT
Start: 2020-09-28 | End: 2020-09-30

## 2020-09-28 RX ORDER — POTASSIUM CHLORIDE 750 MG/1
20-40 TABLET, EXTENDED RELEASE ORAL
Status: DISCONTINUED | OUTPATIENT
Start: 2020-09-28 | End: 2020-10-02 | Stop reason: HOSPADM

## 2020-09-28 RX ORDER — PANTOPRAZOLE SODIUM 40 MG/1
40 TABLET, DELAYED RELEASE ORAL
Status: DISCONTINUED | OUTPATIENT
Start: 2020-09-29 | End: 2020-10-02 | Stop reason: HOSPADM

## 2020-09-28 RX ORDER — WARFARIN SODIUM 4 MG/1
4 TABLET ORAL
Status: COMPLETED | OUTPATIENT
Start: 2020-09-28 | End: 2020-09-28

## 2020-09-28 RX ORDER — FUROSEMIDE 10 MG/ML
40 INJECTION INTRAMUSCULAR; INTRAVENOUS
Status: DISCONTINUED | OUTPATIENT
Start: 2020-09-28 | End: 2020-09-28

## 2020-09-28 RX ORDER — POTASSIUM CHLORIDE 750 MG/1
20 TABLET, EXTENDED RELEASE ORAL 2 TIMES DAILY
Status: DISCONTINUED | OUTPATIENT
Start: 2020-09-28 | End: 2020-10-01

## 2020-09-28 RX ORDER — FUROSEMIDE 10 MG/ML
20 INJECTION INTRAMUSCULAR; INTRAVENOUS ONCE
Status: COMPLETED | OUTPATIENT
Start: 2020-09-28 | End: 2020-09-28

## 2020-09-28 RX ORDER — POTASSIUM CHLORIDE 750 MG/1
40 TABLET, EXTENDED RELEASE ORAL 2 TIMES DAILY
Status: DISCONTINUED | OUTPATIENT
Start: 2020-09-28 | End: 2020-09-28

## 2020-09-28 RX ADMIN — FUROSEMIDE 20 MG: 10 INJECTION, SOLUTION INTRAVENOUS at 08:26

## 2020-09-28 RX ADMIN — METOPROLOL TARTRATE 25 MG: 25 TABLET, FILM COATED ORAL at 08:26

## 2020-09-28 RX ADMIN — HEPARIN SODIUM 5000 UNITS: 5000 INJECTION, SOLUTION INTRAVENOUS; SUBCUTANEOUS at 21:08

## 2020-09-28 RX ADMIN — Medication 2.5 MG: at 13:58

## 2020-09-28 RX ADMIN — FUROSEMIDE 20 MG: 10 INJECTION, SOLUTION INTRAVENOUS at 11:13

## 2020-09-28 RX ADMIN — POTASSIUM CHLORIDE 40 MEQ: 750 TABLET, EXTENDED RELEASE ORAL at 11:13

## 2020-09-28 RX ADMIN — WARFARIN SODIUM 4 MG: 4 TABLET ORAL at 18:43

## 2020-09-28 RX ADMIN — POTASSIUM CHLORIDE 20 MEQ: 750 TABLET, EXTENDED RELEASE ORAL at 21:07

## 2020-09-28 RX ADMIN — ACETAMINOPHEN 975 MG: 325 TABLET, FILM COATED ORAL at 13:58

## 2020-09-28 RX ADMIN — METOPROLOL TARTRATE 25 MG: 25 TABLET, FILM COATED ORAL at 21:07

## 2020-09-28 RX ADMIN — LATANOPROST 1 DROP: 50 SOLUTION OPHTHALMIC at 21:07

## 2020-09-28 RX ADMIN — DORZOLAMIDE HYDROCHLORIDE AND TIMOLOL MALEATE 1 DROP: 20; 5 SOLUTION/ DROPS OPHTHALMIC at 08:27

## 2020-09-28 RX ADMIN — SIMVASTATIN 20 MG: 20 TABLET, FILM COATED ORAL at 21:07

## 2020-09-28 RX ADMIN — ACETAMINOPHEN 975 MG: 325 TABLET, FILM COATED ORAL at 08:26

## 2020-09-28 RX ADMIN — HEPARIN SODIUM 5000 UNITS: 5000 INJECTION, SOLUTION INTRAVENOUS; SUBCUTANEOUS at 08:26

## 2020-09-28 RX ADMIN — ASPIRIN 81 MG CHEWABLE TABLET 81 MG: 81 TABLET CHEWABLE at 08:26

## 2020-09-28 RX ADMIN — POTASSIUM CHLORIDE 20 MEQ: 750 TABLET, EXTENDED RELEASE ORAL at 16:51

## 2020-09-28 RX ADMIN — ACETAMINOPHEN 975 MG: 325 TABLET, FILM COATED ORAL at 21:08

## 2020-09-28 RX ADMIN — DORZOLAMIDE HYDROCHLORIDE AND TIMOLOL MALEATE 1 DROP: 20; 5 SOLUTION/ DROPS OPHTHALMIC at 21:07

## 2020-09-28 RX ADMIN — HEPARIN SODIUM 5000 UNITS: 5000 INJECTION, SOLUTION INTRAVENOUS; SUBCUTANEOUS at 16:51

## 2020-09-28 RX ADMIN — FUROSEMIDE 20 MG: 10 INJECTION, SOLUTION INTRAVENOUS at 16:51

## 2020-09-28 ASSESSMENT — ACTIVITIES OF DAILY LIVING (ADL)
ADLS_ACUITY_SCORE: 14

## 2020-09-28 ASSESSMENT — MIFFLIN-ST. JEOR: SCORE: 1214.34

## 2020-09-28 NOTE — PROGRESS NOTES
Cardiovascular Surgery Progress Note  09/28/2020           Assessment and Plan:   Monika Bee is a 84 year old female with a PMH of bicuspid aortic valve with severe aortic stenosis s/p replacement with bioprosthesis and patent foramen ovale s/p repair (2004), HTN, and GERD developed aortic valve restenosis,  s/p redo sternotomy with aortic valve replacement with 19 mm St Romeo [place] mechanical prosthesis on 09/23/20 with Dr. Rainey. Post op echo with mild RV dysfunction . Extubated on 9/24.      Cardiovascular:   Severe aortic stenosis of bioprosthetic aortic valve - s/p redo sternotomy AVR (mechanical)  HTN  HLD  Most recent echo ([facility] pre-op on 7/29) showed LVEF 65% with aortic stenosis.   - ASA 81, simvastatin 20 mg at bedtime, metoprolol 25 mg BID  - Coumadin for mechanical aortic valve, INR 1.91, no bridging per Dr. Rainey  - Mediastinal tubes removed 9/28, bilateral pleural tubes split 9/28, remain to suction, CXR in am  - TPW capped, will need to cut at discharge given INR    Pulmonary:  Extubated POD 1; Saturating well on RA.  Encourage IS, C&DB, ambulating    Neuro/MSK:  Neuro intact  Acute post-operative pain   - Pain well controlled with IV dilaudid, PO oxycodone    /Renal:  Baseline Creatinine WNL, Pre-op weight 176 lbs, Creatinine today stable  Diuresis - hypervolemic on lasix 20 mg IV BID, gave extra dose of 20 mg IV x1 on 9/28 (40 mg IV total 9/28 AM), then continue 20 mg IV BID, reassess in am    GI/FEN:   Regular diet, continue bowel regimen, + BM  Replace lytes as needed    Endo:  Stress induced hyperglycemia   Managed on insulin drip postop, transitioned to sliding scale goal BG <180  - Discontinue glucose checks    Heme:   Acute blood loss anemia and thrombocytopenia  - Hgb stable; Plt WNL, no signs or symptoms of bleeding    ID:  Stress induced leukocytosis   - WBC WNL, afebrile, no signs or symptoms of infection  - Completed perioperative antibiotics    Prophylaxis:   Stress ulcer  prophylaxis: Pantoprazole 40 mg daily  DVT prophylaxis: Subcutaneous heparin, PCD    Dispo:   Transferred to  on 9/27  Rehab recommending discharge to ARU, pending diuresis and chest tube removal    Staff surgeons have been informed of changes through both verbal and written communication.       Jorge Reyes PA-C  Cardiothoracic Surgery  p: [phone]          Interval History:   No acute events overnight. States pain is well managed on current regimen, Breathing is good, does have some DA SILVA, nothing at rest. Tolerating diet, is passing flatus, + BM. Denies chest pain, palpitations, dizziness, syncopal symptoms, chills, myalgias or sternal popping/clicking.          Medications:       acetaminophen  975 mg Oral TID     aspirin  81 mg Oral Daily     dorzolamide-timolol  1 drop Both Eyes BID     furosemide  20 mg Intravenous BID     heparin ANTICOAGULANT  5,000 Units Subcutaneous Q8H STEPHANIE     latanoprost  1 drop Both Eyes At Bedtime     metoprolol tartrate  25 mg Oral BID     mupirocin  0.5 g Both Nostrils BID     [START ON 9/29/2020] pantoprazole  40 mg Oral QAM AC     potassium chloride  20 mEq Oral BID     sennosides  8.6 mg Oral BID     simvastatin  20 mg Oral At Bedtime     sodium chloride (PF)  3 mL Intracatheter Q8H     warfarin ANTICOAGULANT  4 mg Oral ONCE at 18:00     - MEDICATION INSTRUCTIONS -, carboxymethylcellulose PF, dextrose, glucose **OR** dextrose **OR** glucagon, hydrALAZINE, HYDROmorphone, lidocaine 4%, lidocaine (buffered or not buffered), magnesium sulfate, magnesium sulfate, naloxone, - MEDICATION INSTRUCTIONS -, oxyCODONE, potassium chloride, potassium chloride with lidocaine, potassium chloride, potassium chloride, potassium chloride, potassium phosphate (KPHOS) in D5W IV, potassium phosphate (KPHOS) in D5W IV, potassium phosphate (KPHOS) in D5W IV, potassium phosphate (KPHOS) in D5W IV, potassium phosphate (KPHOS) in D5W IV, sodium chloride (PF), Warfarin Therapy Reminder          Physical  "Exam:   Vitals were reviewed  Blood pressure 105/50, pulse 76, temperature 98.5  F (36.9  C), temperature source Oral, resp. rate 16, height 1.511 m (4' 11.49\"), weight 84.1 kg (185 lb 6.4 oz), SpO2 95 %.  Vitals:    09/26/20 0000 09/27/20 0400 09/28/20 1142   Weight: 86 kg (189 lb 9.5 oz) 86.1 kg (189 lb 13.1 oz) 84.1 kg (185 lb 6.4 oz)      I/O last 3 completed shifts:  In: 480 [P.O.:480]  Out: 1320 [Urine:1200; Chest Tube:120]    Gen: A&Ox4, NAD  CV: RRR, normal S1, S2, no murmurs, rubs or gallops   Pulm: Lungs diminished in bases, otherwise CTA, no wheezing or rhonchi  Abd: Soft, NT, ND, +BS  Ext: 1-2+ LE edema  Neuro: Nonfocal  Incision: c/d/i, no erythema, sternum stable  Tubes/drains: Dressing clean and dry, 200cc serosanguinous output from mediastinal tubes, 200 ml from pleural tubes, no air leak.          Data:    All labs and imaging reviewed by me.  Labs:    Data   Recent Labs   Lab 09/28/20  0515 09/27/20  0733 09/26/20  0324  09/24/20  0401  09/23/20  1456   WBC 7.1 8.1 8.8   < > 14.3*  --  17.7*   HGB 8.3* 8.6* 8.0*   < > 9.8*   < > 11.8   * 105* 105*   < > 100  --  98    192 136*   < > 187  --  212   INR 1.91* 1.67* 1.44*   < >  --   --  1.38*    143 145*   < > 145*  --  142   POTASSIUM 3.5 3.5 3.6   < > 3.6  --  4.0   CHLORIDE 111* 111* 113*   < > 113*  --  113*   CO2 25 26 28   < > 23  --  24   BUN 26 30 28   < > 17  --  13   CR 0.97 1.02 1.07*   < > 0.99  --  0.73   ANIONGAP 7 6 4   < > 8  --  5   RYANN 8.3* 8.3* 8.4*   < > 8.3*  --  8.2*   GLC 91 93 100*   < > 140*  --  158*   ALBUMIN  --   --   --   --  3.4  --  2.5*   PROTTOTAL  --   --   --   --  5.6*  --  5.3*   BILITOTAL  --   --   --   --  1.1  --  1.2   ALKPHOS  --   --   --   --  50  --  54   ALT  --   --   --   --  15  --  23   AST  --   --   --   --  41  --  Canceled, Test credited    < > = values in this interval not displayed.     Recent Labs   Lab 09/28/20  1439 09/28/20  0515 09/27/20  2115 09/27/20  1702 " 09/27/20  1232 09/27/20  0733 09/27/20  0424 09/26/20  1933  09/26/20  0324  09/25/20  0324  09/24/20  0401  09/23/20  1456   GLC  --  91  --   --   --  93  --   --   --  100*  --  102*  --  140*  --  158*   *  --  103* 87 121*  --  83 126*   < >  --    < >  --    < >  --    < >  --     < > = values in this interval not displayed.        Imaging:  Recent Results (from the past 24 hour(s))   XR Chest Port 1 View    Narrative    EXAM: XR CHEST PORT 1 VW  9/28/2020 9:45 AM     HISTORY:  chest tubes, interval change       COMPARISON:  9/27/2020    TECHNIQUE: Single frontal radiograph of the chest    FINDINGS:   Postsurgical mediastinal changes and bibasilar chest tubes, stable.    The trachea is midline. The cardiomediastinal silhouette is  well-defined. The pulmonary vasculature are distinct. No acute  pulmonary opacity or appreciable pneumothorax. Decreased left pleural  fluid and low lung volumes. Aortic valve prosthesis.    No acute osseous abnormalities. The included soft tissues and upper  abdomen and are within normal limits.        Impression    IMPRESSION: Decreased left pleural fluid, otherwise no significant  interval change. Aortic valve prosthesis.    I have personally reviewed the examination and initial interpretation  and I agree with the findings.    JOSE ROACH MD

## 2020-09-28 NOTE — PLAN OF CARE
Discharge Planner SLP   Patient plan for discharge: Rehab  Current status: Pt seen to follow up for swallowing. Pt reports tolerating current diet well and has been cognizant of taking small bites/sips and taking her time while eating.  Pt tolerated regular diet textures well without outward s/sx of aspiration.  Cough was  noted at the end of the snack but it was not felt to be related to swallowing.  Pt's baseline congested cough is noted to be improving.  Recommend continue with regular diet and thin liquids, pt to  continue to take small bites/sips, meds can be given in pudding or apple sauce or like texture.  ST will plan to follow up x1 to ensure safe swallowing prior to signing off.  Barriers to return to prior living situation: none from SLP standpoint  Recommendations for discharge: Per PT and OT, pt will likely meet swallow goals prior to discharge  Rationale for recommendations: Pt will likely meet goals prior to discharge  Entered by: Veronika Gimenez 09/28/2020 2:08 PM

## 2020-09-28 NOTE — INTERIM SUMMARY
[facility] Rehab [facility] Pre-Admission Screen    Referral Source:  UNIT 6C [facility] U6C  Admit date to referring facility: 9/23/2020    Physical Medicine and Rehab Consult Completed: No    Rehab Diagnosis:    09 Cardiac: s/p aortic valve replacement in setting of bicuspid aortic valve for severe aortic stenosis    Justification for Acute Inpatient Rehabilitation  Joseline Bee is a 84 year old female w/ PMH of bicuspid aortic valve, s/p AVR and patent foramen oval repair (2004), HTN, HLD, and obesity who presents w/ severe aortic stenosis. She is s/p redo sternotomy w/ aortic valve replacement on 9/23/2020. Her post-op ECHO reveals mild RV dysfunction.  She has required acute medical management of her pain, bowels, fluid balance requiring diuresis, stress hyperglycemia, and acute blood loss anemia. She is now medically stable and ready to discharge to acute inpatient rehab.   Patient requires an intensive inpatient rehab program to address the following acute impairments: post-operative sternal precautions, pain, impaired strength, impaired activity tolerance, impaired balance, LUE edema, and dysphagia. These impairments are contributing to functional limitations, specifically impacting her safety and independence w/ bed mobility, transfers, gait, stairs, ADLs, IADLs, and swallowing.     Current Active Medical Management Needs/Risks for Clinical Complications  The patient requires the high level of rehabilitation physician supervision that accompanies the provision of intensive rehabilitation therapy.  The patient needs the services of the rehabilitation physician to assess the patient medically and functionally and to modify the course of treatment as needed to maximize the patient's capacity to benefit from the rehabilitation process.  The patient requires medical mgmt and assessment of:    Cardiovascular status in setting of HTN, HLD, and s/p AVR: MAP goal > 65, on ASA, Simvastatin,  Metoprolol, Zocor.    Anticoagulation in setting of AVR: Heparin, Warfarin.    Pain: Tylenol, Oxycodone.    Nutritional status in pt w/ dysphagia: on regular diet w/ thin liquids, ensure adequate nutritional intake.    Renal function, fluid and electrolyte balance: hypervolemic - currently on Lasix, has required potassium chloride supplementation.    Acute blood loss anemia: keep Hgb > 7.0, trend CBC and Coags.    Bowel function as pt at risk for opioid induced constipation: Senokot.    Stress ulcer prophylaxis: Pantoprazole.    Ophthalmalogy needs in setting of macular degeneration and glaucoma: Cosopt, Xalatan.  The patient is at risk for falls w/ injury, skin breakdown, wound infection, aspiration, and DVT.   Body Mass Index 36.83    Past Medical/Surgical History  Surgery in the past 100 days: Yes  Additional relevant past medical history: bicuspid aortic valve with severe aortic stenosis s/p replacement with bioprosthesis and patent foramen ovale s/p repair (2004), HTN, GERD, aortic valve restenosis,  s/p redo sternotomy with aortic valve replacement, aortic aneurysm, HLD, early dry stage nonexudative age-related macular degeneration of B eyes, primary osteoarthritis affecting multiple joints, B eye pseudophakia, right posterior capsular opacification, palpitations, vitamin D deficiency, class 1 obesity, L femur fracture s/p repair, syncope, chronic open angle glaucoma B eyes, s/p Right TKA (2015), B carpal tunnel release.    Level of Functioning prior to Admission:  Home Environment  Lives with: alone  Living arrangements:  apartment  Home accessibility: no concerns  Stairs to enter home:  none  Stairs to negotiate within home: none   Stair railings at home:  n/a  Living environment comments: Pt has a tub/shower.  Equipment currently used at home: cane, straight  Activity/exercise/self-care comment: Would like to get back to walking for exercise but has not been able to do recently    Ambulation: 1-->assistive  equipment  Transferrin-->independent  Toiletin-->independent  Bathin-->independent  Dressin-->independent   Eatin-->independent  Communication: 0-->understands/communicates without difficulty  Swallowin-->swallows foods/liquids without difficulty  Cognition: 0 - no cognition issues reported  Prior Functional Level Comment: Pt uses cane for any mobility out of apartment(taking out garbage, walking to car, ect), IND with mobility within apartment    Level of Function: GG Scale (Section GG Functional Ability and Goals; CMS's STORY Version 3.0 Manual effective 10.1.2019):  PT Current Function Goals for Rehab   Bed Rolling 3 Partial/moderate assistance 6 Independent   Supine to Sit 3 Partial/moderate assistance 6 Independent   Sit to Stand 3 Partial/moderate assistance 6 Independent   Transfer 3 Partial/moderate assistance 6 Independent   Ambulation 1 Dependent 6 Independent   Stairs Not completed 6 Independent     OT Current Function Goals for Rehab   Feeding 5 Setup or clean-up assistance 6 Independent   Grooming 3 Partial/moderate assistance 6 Independent   Bathing 1 Dependent 6 Independent   Upper Body Dressing Not completed 6 Independent   Lower Body Dressing Not completed 6 Independent   Toileting 2 Substantial/maximal assistance 6 Independent   Toilet Transfer 1 Dependent 6 Independent   Tub/Shower Transfer Not completed 6 Independent   Cognition Not Assessed Independent     SLP Current Function Goals for Rehab   Swallow Impaired Tolerate least restrictive diet without signs & symptoms of aspiration and adhere to safe swallow strategies   Communication Not Impaired Not applicable       Current Diet:  Regular diet and Thin liquids    Summary Statement:  Joseline requires cues for safe transfer technique and safe ADL completion in setting of new post-surgical sternal precautions. She performs bed mobility and supine <>sit w/ mod A w/ cues for logroll technique. She performs STS transfers to  w/  min-mod A w/ cues for technique. She ambulated a total of 125 ft w/ WW, CGA + Ax1 for w/c follow w/ rest breaks needed d/t fatigue. She requires cues for improved posture and step length during ambulation. She has demonstrated good carryover of previously learned information in therapy. She required Ax2 to transfer to commode w/ use of WW and max A for completion of pericares in standing. She performed seated g/h tasks w/ min A + setup A and verbal cues. She performs a bed bath w/ Ax2. She would also benefit from assessment of her lymphedema needs in setting of LUE edema. She will also require a full ADL assessment while admitted to acute inpatient rehab. She had demonstrated dysphagia and recently progressed to a regular diet w/ thin liquids.     Expected Therapies and Services required during Inpatient Rehab admission  Intensity of Therapy: Patient requires intensive therapies not available in a lesser level of care. Patient is motivated, making gains, and can tolerate 3 hours of therapy a day.  Physical Therapy: 90 minutes per day, at least 5 days a week for 10 days  Occupational Therapy: 90 minutes per day, at least 5 days a week for 10 days  Speech and Language Therapy: no speech therapy needs identified at this time.   Rehabilitation Nursing Needs: Patient requires 24 hour Rehab Nursing to manage wound care, bowel program, vitals, medication education, carryover of new rehab techniques, care coordination, skin integrity, pain management, provide safe environment for patient at falls risk, monitor nutritional intake and edema management.    Precautions/restrictions/special needs:   Precautions: fall precautions and Sternal precautions   Restrictions: no lifting, pushing, pulling > 10# w/ BUEs x 8 weeks   Special Needs: visually impaired    Expected Level of Improvement: Pt will achieve a level of mod IND w/ bed mobility, transfers and gait w/ WW, stairs, ADLs w/ adaptive equipment, IADLs, and effective  swallowing.  Expected Length of time to achieve: 10 days    Anticipated Discharge Needs:  Anticipated Discharge Destination: Home  Anticipated Discharge Support: Family member and Friends  24/7 support available : Unknown  Identified caregiver(s):  Sisters Nydia Byrne, friend Belkis  Anticipated Discharge Needs: Home with outpatient therapy    Identified challenges/barriers: None.    Rehab Admissions Liaison Signature:     Physician statement of review and agreement:  I have reviewed and am in agreement of the need for IRF stay to address above functional and medical needs. In addition to above statements address, Patient requires intensive active and ongoing therapeutic intervention and multiple therapies; Patient requires medical supervision; Expected to actively participate in the intensive rehab program; Sufficiently stable to actively participate; Expectation for measurable improvement in functional capacity or adaption to impairments.    Physician Signature/Date/Time:

## 2020-09-28 NOTE — PLAN OF CARE
6C PT  Discharge Planner PT   Patient plan for discharge: ARU  Current status: Patient continues to be motivated to participate in therapy. Patient states sternal precautions w/ cueing. Sit<>stand, Mod A x1 progressing to Min A x1. Ambulated 125ft w/FWW and CGA, w/c follow. Intermittent seated rest breaks taken throughout bout. VSS on RA throughout activity.    Barriers to return to prior living situation: Medical status, post-op precautions, weakness, decreased activity tolerance  Recommendations for discharge: ARU  Rationale for recommendations: Pt below baseline and motivated to return to PLOF. Would benefit from intensive rehab services to maximize safety and independence with functional mobility. Pt would tolerate 3hrs of therapy per day.

## 2020-09-28 NOTE — CONSULTS
[facility] Nurse Inpatient Wound Assessment   Reason for consultation: Evaluate and treat  Coccyx and axilla wounds    Assessment  Allison cleft wounds due to Intertrigo  Status: initial assessment    Right axilla wounds due to Fungal  Status: initial assessment    Patient has a sternal incisional VAC, most likely managed by CV surgery, but not mentioned in their notes - sticky note left for MD.    Treatment Plan  Allison cleft BID and after each incontinent episode use young cleanse and protect and landry dry wipes. Remove only soiled paste, then reapply thin layer of critic aid barrier paste.  If complete removal is needed use baby/mineral oil. Avoid pre moisten wipes. Avoid use of diaper    Recommend nystatin powder to right axilla due to fungal dermatitis - sticky note left for MD    Orders Written  Recommended provider order: Antifungal fungal powder to right axilla  WO Nurse follow-up plan:signing off  Nursing to notify the Provider(s) and re-consult the WOC Nurse if wound(s) deteriorates or new skin concern.    Patient History  According to provider note(s):  Monika Bee is a 84 year old female with history of bicuspid aortic valve with severe aortic stenosis s/p replacement with bioprosthesis and patent foramen ovale s/p repair (), HTN, and GERD developed aortic valve restenosis,  s/p redo sternotomy with aortic valve replacement with 19 mm [employer] mechanical prosthesis on 20 with Dr. Rainey. Post op echo with mild RV dysfunction . Extubated on .    Objective Data  Containment of urine/stool: Incontinence Protocol, per patient intermittently using external female catheter.    Active Diet Order  Orders Placed This Encounter      Regular Diet Adult Thin Liquids (water, ice chips, juice, milk, gelatin, ice cream, etc)      Output:   I/O last 3 completed shifts:  In: 720 [P.O.:720]  Out: 1150 [Urine:700; Other:150; Chest Tube:300]    Risk Assessment:   Sensory Perception: 4-->no  impairment  Moisture: 3-->occasionally moist  Activity: 2-->chairfast  Mobility: 3-->slightly limited  Nutrition: 3-->adequate  Friction and Shear: 2-->potential problem  Haider Score: 17                          Labs:   Recent Labs   Lab 20  0515  20  0324 20  0401   ALBUMIN  --   --   --  3.4   HGB 8.3*   < > 8.4* 9.8*   INR 1.91*   < > 1.43*  --    WBC 7.1   < > 9.5 14.3*   A1C  --   --  5.1  --     < > = values in this interval not displayed.       Physical Exam  Areas of skin assessed: focused Allison cleft, coccyx, sacrum, bilateral buttock, bilateral axilla    Wound Location:   cleft    20    Date of last photo 20  Wound History: Educated patient regarding moisture management, per patient has reoccurring moisture issues as home as well  Wound Base: 100 % dermis     Palpation of the wound bed: normal      Drainage: none     Description of drainage: none     Measurements (length x width x depth, in cm) 2.5  x 0.2  x  0.01 cm      Tunneling N/A     Undermining N/A  Periwound skin: intact      Color: normal and consistent with surrounding tissue      Temperature: normal   Odor: none    Wound Location:  Right axilla     20    Date of last photo 20  Wound History: Per patient it doesn't itch, no erythema on left axilla    Wound Base: 100 % blanchable erythema     Palpation of the wound bed: normal      Drainage: none     Description of drainage: none     Measurements (length x width x depth, in cm) 8  x 2  x  0 cm      Tunneling N/A     Undermining N/A  Periwound skin: intact      Color: normal and consistent with surrounding tissue      Temperature: normal   Odor: none      Interventions  Visual inspection and assessment completed   Wound Care Rationale Protect periwound skin, Promote moist wound healing without tissue dehydration  and Provide protection   Wound Care: done per plan of care  Supplies: at bedside and discussed with patient  Current off-loading measures:  Pillows  Current support surface: Standard  Atmos Air mattress  Education provided to: importance of repositioning, plan of care, wound progress and Moisture management  Discussed plan of care with Patient    Ramila Gomez, RN, CWOCN

## 2020-09-28 NOTE — PHARMACY-ADMISSION MEDICATION HISTORY
Admission medication history interview status for the 9/23/2020 admission is complete. See Epic admission navigator for allergy information, pharmacy, prior to admission medications and immunization status.     Medication history interview sources:    -Reviewed pharmacy preop assessment and clinic note from 9/18/2020 which includes updated medication history. Formal medication history will not be conducted during this admission.  -RN completed last dose times.    Changes made to PTA medication list (reason)  Added: none  Deleted: none  Changed: none    Prior to Admission medications    Medication Sig Last Dose Taking? Auth Provider   carboxymethylcellulose PF (REFRESH PLUS) 0.5 % ophthalmic solution Place 1 drop into both eyes 3 times daily as needed for dry eyes 9/23/2020 at Unknown time Yes Unknown, Entered By History   dorzolamide-timolol (COSOPT) 2-0.5 % ophthalmic solution Place 1 drop into both eyes 2 times daily  9/23/2020 at Unknown time Yes Reported, Patient   enoxaparin ANTICOAGULANT (LOVENOX) 80 MG/0.8ML syringe  9/22/2020 at 0800 Yes Reported, Patient   latanoprost (XALATAN) 0.005 % ophthalmic solution Place 1 drop into both eyes At Bedtime  9/23/2020 at Unknown time Yes Reported, Patient   metoprolol tartrate (LOPRESSOR) 50 MG tablet TAKE 1 TABLET BY MOUTH TWICE A DAY 9/23/2020 at 0445 Yes Reported, Patient   calcium carbonate 600 mg-vitamin D 400 units (CALTRATE) 600-400 MG-UNIT per tablet Take 2 tablets by mouth daily 9/16/2020  Unknown, Entered By History   celecoxib (CELEBREX) 100 MG capsule Take 100 mg by mouth daily (with breakfast)  9/19/2020  Reported, Patient   Methylcellulose, Laxative, (CITRUCEL PO) Take 2 capsules by mouth daily   Unknown, Entered By History   Multiple Vitamins-Minerals (OCUVITE ADULT FORMULA PO) Take 1 tablet by mouth daily  9/16/2020  Reported, Patient   multivitamin (CENTRUM SILVER) tablet Take 1 tablet by mouth daily 9/16/2020  Unknown, Entered By History   simvastatin  (ZOCOR) 20 MG tablet Take 20 mg by mouth At Bedtime  9/16/2020  Reported, Patient   spironolactone (ALDACTONE) 25 MG tablet TAKE 1 TABLET(25 MG) BY MOUTH EVERY DAY IN THE MORNING 9/16/2020  Reported, Patient   warfarin ANTICOAGULANT (COUMADIN) 4 MG tablet Take 2 mg (1/2 tablet) on Fridays and 4 mg by mouth on all other days of the week.  Takes at 4:30 PM. 9/17/2020  Reported, Patient     Medication history completed by: Belem Holley, Pharm.D., [facility]  Pager [phone]

## 2020-09-28 NOTE — PLAN OF CARE
D/I/A: Pt here s/p AVR. 2 CTs removed. Purewick catheter in place but patient appears continent. K replaced. Some sinus arrhythmia and irregular heart rhythm but does not appear to be a-fib.   P: Continue to monitor.

## 2020-09-28 NOTE — PROGRESS NOTES
Social Work Services Progress Note    Hospital Day: 6  Date of Initial Social Work Evaluation:  9/25/2020  Collaborated with:  CVTS, [facility] admissions, chart review, pt, pt's sister    Data:  Per CVTS rounds, pt may be ready for discharge to ARU later this week. Pt currently has chest tubes and needs further diuresis.     Pt being referred to ARU and needs prior auth form [facility] Medicare Advantage insurance.     SW called [facility] ([facility]); admissions staff was out. GUDELIA then called covering admisisons [facility] who states they are not taking ARU admissions at this time due to their ARU being converted to Covid-only rehab.     SW updated pt and sister, Pat, on the phone. Pt and sister both state they actually prefer [facility] and would like to pursue placement there.     SW updated [facility]; they believe pt looks clinically appropriate for ARU.     SW will submit for prior auth on 9/29/2020 or later, when pt is medically stable for discharge to ARU.     Intervention:  Discharge planning and coordination of care.      Assessment:  Pt remains hospitalized at this time    Plan:    Anticipated Disposition:  Facility:  [facility]    Barriers to d/c plan:  Chest tubes, IV diuretics, prior auth, bed availability    Follow Up:  SW to continue to follow for discharge planning.      Juany RIVERA, LICSW  6C Cardiology Unit   [facility]  Phone: [phone]  Pager: [phone]

## 2020-09-28 NOTE — PLAN OF CARE
Temp: 98.5  F (36.9  C) Temp src: Oral BP: (!) 136/92 Pulse: 83   Resp: 17 SpO2: 96 % O2 Device: None (Room air) Oxygen Delivery: 1 LPM     D: Admitted for redo AVR 9/23. Hx aortic stenosis, HTN, GERD, glaucoma, HLD    I/A: Joseline (she/her) is A&O x4. Tele in place, SR. VSS on RA. PIV in place, SL. CT x4 in place, draining adequately. Wound vac to sternal incision, no output. No c/o pain this shift. Up with assist of 2 per report, though not OOB overnight. Slept well    P: Continue to monitor and follow POC. Notify CVTS with changes

## 2020-09-29 ENCOUNTER — APPOINTMENT (OUTPATIENT)
Dept: CARDIOLOGY | Facility: CLINIC | Age: 85
DRG: 228 | End: 2020-09-29
Attending: PHYSICIAN ASSISTANT
Payer: COMMERCIAL

## 2020-09-29 ENCOUNTER — APPOINTMENT (OUTPATIENT)
Dept: SPEECH THERAPY | Facility: CLINIC | Age: 85
DRG: 228 | End: 2020-09-29
Attending: SURGERY
Payer: COMMERCIAL

## 2020-09-29 ENCOUNTER — APPOINTMENT (OUTPATIENT)
Dept: GENERAL RADIOLOGY | Facility: CLINIC | Age: 85
DRG: 228 | End: 2020-09-29
Attending: PHYSICIAN ASSISTANT
Payer: COMMERCIAL

## 2020-09-29 ENCOUNTER — APPOINTMENT (OUTPATIENT)
Dept: ULTRASOUND IMAGING | Facility: CLINIC | Age: 85
DRG: 228 | End: 2020-09-29
Attending: PHYSICIAN ASSISTANT
Payer: COMMERCIAL

## 2020-09-29 LAB
ANION GAP SERPL CALCULATED.3IONS-SCNC: 5 MMOL/L (ref 3–14)
BUN SERPL-MCNC: 25 MG/DL (ref 7–30)
CALCIUM SERPL-MCNC: 8.4 MG/DL (ref 8.5–10.1)
CHLORIDE SERPL-SCNC: 111 MMOL/L (ref 94–109)
CO2 SERPL-SCNC: 26 MMOL/L (ref 20–32)
CREAT SERPL-MCNC: 0.93 MG/DL (ref 0.52–1.04)
ERYTHROCYTE [DISTWIDTH] IN BLOOD BY AUTOMATED COUNT: 13.4 % (ref 10–15)
GFR SERPL CREATININE-BSD FRML MDRD: 57 ML/MIN/{1.73_M2}
GLUCOSE SERPL-MCNC: 101 MG/DL (ref 70–99)
HCT VFR BLD AUTO: 29.2 % (ref 35–47)
HGB BLD-MCNC: 9.2 G/DL (ref 11.7–15.7)
INR PPP: 2.02 (ref 0.86–1.14)
INTERPRETATION ECG - MUSE: NORMAL
MAGNESIUM SERPL-MCNC: 2.1 MG/DL (ref 1.6–2.3)
MCH RBC QN AUTO: 32.7 PG (ref 26.5–33)
MCHC RBC AUTO-ENTMCNC: 31.5 G/DL (ref 31.5–36.5)
MCV RBC AUTO: 104 FL (ref 78–100)
PLATELET # BLD AUTO: 291 10E9/L (ref 150–450)
POTASSIUM SERPL-SCNC: 4 MMOL/L (ref 3.4–5.3)
RBC # BLD AUTO: 2.81 10E12/L (ref 3.8–5.2)
SODIUM SERPL-SCNC: 142 MMOL/L (ref 133–144)
WBC # BLD AUTO: 7.6 10E9/L (ref 4–11)

## 2020-09-29 PROCEDURE — 80048 BASIC METABOLIC PNL TOTAL CA: CPT | Performed by: PHYSICIAN ASSISTANT

## 2020-09-29 PROCEDURE — 83735 ASSAY OF MAGNESIUM: CPT | Performed by: PHYSICIAN ASSISTANT

## 2020-09-29 PROCEDURE — 25500064 ZZH RX 255 OP 636: Performed by: INTERNAL MEDICINE

## 2020-09-29 PROCEDURE — 92526 ORAL FUNCTION THERAPY: CPT | Mod: GN | Performed by: SPEECH-LANGUAGE PATHOLOGIST

## 2020-09-29 PROCEDURE — 25000132 ZZH RX MED GY IP 250 OP 250 PS 637: Performed by: STUDENT IN AN ORGANIZED HEALTH CARE EDUCATION/TRAINING PROGRAM

## 2020-09-29 PROCEDURE — 21400000 ZZH R&B CCU UMMC

## 2020-09-29 PROCEDURE — 25000132 ZZH RX MED GY IP 250 OP 250 PS 637: Performed by: PHYSICIAN ASSISTANT

## 2020-09-29 PROCEDURE — 25000128 H RX IP 250 OP 636: Performed by: PHYSICIAN ASSISTANT

## 2020-09-29 PROCEDURE — 93005 ELECTROCARDIOGRAM TRACING: CPT

## 2020-09-29 PROCEDURE — 36415 COLL VENOUS BLD VENIPUNCTURE: CPT | Performed by: PHYSICIAN ASSISTANT

## 2020-09-29 PROCEDURE — 93010 ELECTROCARDIOGRAM REPORT: CPT | Performed by: INTERNAL MEDICINE

## 2020-09-29 PROCEDURE — 71046 X-RAY EXAM CHEST 2 VIEWS: CPT

## 2020-09-29 PROCEDURE — 85027 COMPLETE CBC AUTOMATED: CPT | Performed by: PHYSICIAN ASSISTANT

## 2020-09-29 PROCEDURE — 85610 PROTHROMBIN TIME: CPT | Performed by: PHYSICIAN ASSISTANT

## 2020-09-29 PROCEDURE — 25000128 H RX IP 250 OP 636: Performed by: STUDENT IN AN ORGANIZED HEALTH CARE EDUCATION/TRAINING PROGRAM

## 2020-09-29 PROCEDURE — 25000132 ZZH RX MED GY IP 250 OP 250 PS 637: Performed by: SURGERY

## 2020-09-29 PROCEDURE — 93971 EXTREMITY STUDY: CPT | Mod: LT

## 2020-09-29 PROCEDURE — 93306 TTE W/DOPPLER COMPLETE: CPT | Mod: 26 | Performed by: INTERNAL MEDICINE

## 2020-09-29 PROCEDURE — 71045 X-RAY EXAM CHEST 1 VIEW: CPT

## 2020-09-29 RX ORDER — FUROSEMIDE 10 MG/ML
20 INJECTION INTRAMUSCULAR; INTRAVENOUS ONCE
Status: COMPLETED | OUTPATIENT
Start: 2020-09-29 | End: 2020-09-29

## 2020-09-29 RX ORDER — WARFARIN SODIUM 4 MG/1
4 TABLET ORAL
Status: COMPLETED | OUTPATIENT
Start: 2020-09-29 | End: 2020-09-29

## 2020-09-29 RX ADMIN — HEPARIN SODIUM 5000 UNITS: 5000 INJECTION, SOLUTION INTRAVENOUS; SUBCUTANEOUS at 16:13

## 2020-09-29 RX ADMIN — ACETAMINOPHEN 975 MG: 325 TABLET, FILM COATED ORAL at 13:49

## 2020-09-29 RX ADMIN — METOPROLOL TARTRATE 25 MG: 25 TABLET, FILM COATED ORAL at 19:46

## 2020-09-29 RX ADMIN — LATANOPROST 1 DROP: 50 SOLUTION OPHTHALMIC at 19:50

## 2020-09-29 RX ADMIN — WARFARIN SODIUM 4 MG: 4 TABLET ORAL at 18:17

## 2020-09-29 RX ADMIN — POTASSIUM CHLORIDE 20 MEQ: 750 TABLET, EXTENDED RELEASE ORAL at 19:50

## 2020-09-29 RX ADMIN — DORZOLAMIDE HYDROCHLORIDE AND TIMOLOL MALEATE 1 DROP: 20; 5 SOLUTION/ DROPS OPHTHALMIC at 19:51

## 2020-09-29 RX ADMIN — FUROSEMIDE 20 MG: 10 INJECTION, SOLUTION INTRAVENOUS at 16:12

## 2020-09-29 RX ADMIN — METOPROLOL TARTRATE 25 MG: 25 TABLET, FILM COATED ORAL at 08:58

## 2020-09-29 RX ADMIN — HEPARIN SODIUM 5000 UNITS: 5000 INJECTION, SOLUTION INTRAVENOUS; SUBCUTANEOUS at 21:58

## 2020-09-29 RX ADMIN — FUROSEMIDE 20 MG: 10 INJECTION, SOLUTION INTRAVENOUS at 16:13

## 2020-09-29 RX ADMIN — ACETAMINOPHEN 975 MG: 325 TABLET, FILM COATED ORAL at 19:46

## 2020-09-29 RX ADMIN — DORZOLAMIDE HYDROCHLORIDE AND TIMOLOL MALEATE 1 DROP: 20; 5 SOLUTION/ DROPS OPHTHALMIC at 09:00

## 2020-09-29 RX ADMIN — POTASSIUM CHLORIDE 20 MEQ: 750 TABLET, EXTENDED RELEASE ORAL at 16:12

## 2020-09-29 RX ADMIN — PANTOPRAZOLE SODIUM 40 MG: 40 TABLET, DELAYED RELEASE ORAL at 09:00

## 2020-09-29 RX ADMIN — POTASSIUM CHLORIDE 20 MEQ: 750 TABLET, EXTENDED RELEASE ORAL at 08:59

## 2020-09-29 RX ADMIN — SIMVASTATIN 20 MG: 20 TABLET, FILM COATED ORAL at 21:58

## 2020-09-29 RX ADMIN — ASPIRIN 81 MG CHEWABLE TABLET 81 MG: 81 TABLET CHEWABLE at 08:57

## 2020-09-29 RX ADMIN — HUMAN ALBUMIN MICROSPHERES AND PERFLUTREN 6 ML: 10; .22 INJECTION, SOLUTION INTRAVENOUS at 14:30

## 2020-09-29 RX ADMIN — FUROSEMIDE 20 MG: 10 INJECTION, SOLUTION INTRAVENOUS at 09:01

## 2020-09-29 RX ADMIN — HEPARIN SODIUM 5000 UNITS: 5000 INJECTION, SOLUTION INTRAVENOUS; SUBCUTANEOUS at 08:58

## 2020-09-29 RX ADMIN — ACETAMINOPHEN 975 MG: 325 TABLET, FILM COATED ORAL at 09:00

## 2020-09-29 ASSESSMENT — PAIN DESCRIPTION - DESCRIPTORS: DESCRIPTORS: ACHING;SORE

## 2020-09-29 ASSESSMENT — ACTIVITIES OF DAILY LIVING (ADL)
ADLS_ACUITY_SCORE: 15
ADLS_ACUITY_SCORE: 14
ADLS_ACUITY_SCORE: 15
ADLS_ACUITY_SCORE: 15
ADLS_ACUITY_SCORE: 14
ADLS_ACUITY_SCORE: 13

## 2020-09-29 ASSESSMENT — MIFFLIN-ST. JEOR: SCORE: 1209.8

## 2020-09-29 NOTE — PROGRESS NOTES
Cardiovascular Surgery Progress Note  09/29/2020           Assessment and Plan:   Monika Bee is a 84 year old female with a PMH of bicuspid aortic valve with severe aortic stenosis s/p replacement with bioprosthesis and patent foramen ovale s/p repair (2004), HTN, and GERD developed aortic valve restenosis,  s/p redo sternotomy with aortic valve replacement with 19 mm [employer] [place] mechanical prosthesis on 09/23/20 with Dr. Rainey. Post op echo with mild RV dysfunction . Extubated on 9/24.      Cardiovascular:   Severe aortic stenosis of bioprosthetic aortic valve - s/p redo sternotomy AVR (mechanical)  HTN  HLD  Most recent echo ([facility] pre-op on 7/29) showed LVEF 65% with aortic stenosis.   - ASA 81, simvastatin 20 mg at bedtime, metoprolol 25 mg BID  - Coumadin for mechanical aortic valve, INR therapeutic, no bridging per Dr. Rainey  - Mediastinal tubes removed 9/28, bilateral pleural tubes split 9/28, CXR stable, removed bilateral pleural tubes on 9/29, repeat CXR this PM  - TPW capped, will need to cut at discharge given INR  Atrial flutter variable AV block  Went into aflutter 9/29, rates in 90s to 100s. Patient not having any symptoms. EKG obtained confirming aflutter, but also showed STEMI. Patient without any chest pain or SOB. Pre-op coronary arteries without disease. Curbsided cardiology, recommended echo.   - Will check echo for WMA.    Pulmonary:  Extubated POD 1; Saturating well on RA.  Encourage IS, C&DB, ambulating    Neuro/MSK:  Neuro intact  Acute post-operative pain   - Pain well controlled with IV dilaudid, PO oxycodone    /Renal:  Baseline Creatinine WNL, Pre-op weight 176 lbs, Creatinine today stable  Diuresis - hypervolemic on lasix 20 mg IV BID, gave extra dose of 20 mg IV x1 on 9/28 (40 mg IV total 9/28 AM), Weight down only 1 lb, will give 40 mg IV x1 on 9/29 PM, reassess in am    GI/FEN:   Regular diet, continue bowel regimen, + BM  Replace lytes as needed    Endo:  Stress induced  hyperglycemia   Managed on insulin drip postop, transitioned to sliding scale goal BG <180  - Discontinue glucose checks    Heme:   Acute blood loss anemia and thrombocytopenia  - Hgb stable; Plt WNL, no signs or symptoms of bleeding    ID:  Stress induced leukocytosis   - WBC WNL, afebrile, no signs or symptoms of infection  - Completed perioperative antibiotics    Prophylaxis:   Stress ulcer prophylaxis: Pantoprazole 40 mg daily  DVT prophylaxis: Subcutaneous heparin, PCD    Dispo:   Transferred to  on 9/27  Rehab recommending discharge to ARU, pending diuresis and chest tube removal    Staff surgeons have been informed of changes through both verbal and written communication.       Jorge Reyes PA-C  Cardiothoracic Surgery  p: [phone]          Interval History:   No acute events overnight. Went into aflutter this am. Rates controlled. Patient denies any chest pain/pressure, SOB, orthopnea, palpitations, fatigue, lightheadedness, dizziness. She does complain of increased LUE swelling. States pain is well managed on current regimen. Tolerating diet, appetite is okay, is passing flatus, + BM. Denies syncopal symptoms, chills, myalgias or sternal popping/clicking.          Medications:       acetaminophen  975 mg Oral TID     aspirin  81 mg Oral Daily     dorzolamide-timolol  1 drop Both Eyes BID     furosemide  20 mg Intravenous BID     heparin ANTICOAGULANT  5,000 Units Subcutaneous Q8H STEPHANIE     latanoprost  1 drop Both Eyes At Bedtime     metoprolol tartrate  25 mg Oral BID     pantoprazole  40 mg Oral QAM AC     potassium chloride  20 mEq Oral BID     sennosides  8.6 mg Oral BID     simvastatin  20 mg Oral At Bedtime     sodium chloride (PF)  3 mL Intracatheter Q8H     - MEDICATION INSTRUCTIONS -, carboxymethylcellulose PF, dextrose, glucose **OR** dextrose **OR** glucagon, hydrALAZINE, HYDROmorphone, lidocaine 4%, lidocaine (buffered or not buffered), magnesium sulfate, magnesium sulfate, naloxone, -  "MEDICATION INSTRUCTIONS -, oxyCODONE, potassium chloride, potassium chloride with lidocaine, potassium chloride, potassium chloride, potassium chloride, potassium phosphate (KPHOS) in D5W IV, potassium phosphate (KPHOS) in D5W IV, potassium phosphate (KPHOS) in D5W IV, potassium phosphate (KPHOS) in D5W IV, potassium phosphate (KPHOS) in D5W IV, sodium chloride (PF), Warfarin Therapy Reminder          Physical Exam:   Vitals were reviewed  Blood pressure 121/63, pulse 97, temperature 98.5  F (36.9  C), temperature source Oral, resp. rate 16, height 1.511 m (4' 11.49\"), weight 84.1 kg (185 lb 6.4 oz), SpO2 96 %.  Vitals:    09/26/20 0000 09/27/20 0400 09/28/20 1142   Weight: 86 kg (189 lb 9.5 oz) 86.1 kg (189 lb 13.1 oz) 84.1 kg (185 lb 6.4 oz)      I/O last 3 completed shifts:  In: 960 [P.O.:960]  Out: 930 [Urine:800; Chest Tube:130]    Gen: A&Ox4, NAD  CV: RRR, normal S1, S2, no murmurs, rubs or gallops   Pulm: Lungs diminished in bases, otherwise CTA, no wheezing or rhonchi  Abd: Soft, NT, ND, +BS  Ext: 1-2+ LE edema  Neuro: Nonfocal  Incision: c/d/i, no erythema, sternum stable  Tubes/drains: Dressing clean and dry, <100 ml from both pleural tubes, no air leak.          Data:    All labs and imaging reviewed by me.  Labs:    Data   Recent Labs   Lab 09/29/20  0530 09/28/20  0515 09/27/20  0733  09/24/20  0401  09/23/20  1456   WBC 7.6 7.1 8.1   < > 14.3*  --  17.7*   HGB 9.2* 8.3* 8.6*   < > 9.8*   < > 11.8   * 106* 105*   < > 100  --  98    230 192   < > 187  --  212   INR 2.02* 1.91* 1.67*   < >  --   --  1.38*    143 143   < > 145*  --  142   POTASSIUM 4.0 3.5 3.5   < > 3.6  --  4.0   CHLORIDE 111* 111* 111*   < > 113*  --  113*   CO2 26 25 26   < > 23  --  24   BUN 25 26 30   < > 17  --  13   CR 0.93 0.97 1.02   < > 0.99  --  0.73   ANIONGAP 5 7 6   < > 8  --  5   RYANN 8.4* 8.3* 8.3*   < > 8.3*  --  8.2*   * 91 93   < > 140*  --  158*   ALBUMIN  --   --   --   --  3.4  --  2.5* "   PROTTOTAL  --   --   --   --  5.6*  --  5.3*   BILITOTAL  --   --   --   --  1.1  --  1.2   ALKPHOS  --   --   --   --  50  --  54   ALT  --   --   --   --  15  --  23   AST  --   --   --   --  41  --  Canceled, Test credited    < > = values in this interval not displayed.     Recent Labs   Lab 09/29/20  0530 09/28/20  1439 09/28/20  0515 09/27/20  2115 09/27/20  1702 09/27/20  1232 09/27/20  0733 09/27/20  0424 09/26/20  1933  09/26/20  0324  09/25/20  0324  09/24/20  0401   *  --  91  --   --   --  93  --   --   --  100*  --  102*  --  140*   BGM  --  112*  --  103* 87 121*  --  83 126*   < >  --    < >  --    < >  --     < > = values in this interval not displayed.        Imaging:  Recent Results (from the past 24 hour(s))   XR Chest Port 1 View    Narrative    EXAM: XR CHEST PORT 1 VW  9/28/2020 9:45 AM     HISTORY:  chest tubes, interval change       COMPARISON:  9/27/2020    TECHNIQUE: Single frontal radiograph of the chest    FINDINGS:   Postsurgical mediastinal changes and bibasilar chest tubes, stable.    The trachea is midline. The cardiomediastinal silhouette is  well-defined. The pulmonary vasculature are distinct. No acute  pulmonary opacity or appreciable pneumothorax. Decreased left pleural  fluid and low lung volumes. Aortic valve prosthesis.    No acute osseous abnormalities. The included soft tissues and upper  abdomen and are within normal limits.        Impression    IMPRESSION: Decreased left pleural fluid, otherwise no significant  interval change. Aortic valve prosthesis.    I have personally reviewed the examination and initial interpretation  and I agree with the findings.    JOSE ROACH MD

## 2020-09-29 NOTE — PLAN OF CARE
D: Pt admitted 9/23, s/p AVR re-do. She has a past medical history of aortic stenosis, HTN, GERD, glaucoma, and HLD.    I/A:  A&Ox4, VSS, afebrile. Rate-controlled A-flutter (HR 70-90's) identified on the tele monitor this am; 12-lead EKG done and confirmed rhythm as a-flutter with potential MI component--CHU Patel of CVTS, notified and assessed pt at bedside. Pt denied related symptoms, including palpitations, shortness of breath and chest pain. Pt had RHC yesterday showing clear coronary arteries, however, Echo done at bedside to further assess for wall motion abnormality. Of note, when talking with tele monitoring staff, it was noted that patient has been in and out of a-flutter since arriving to  on 9/27; pt denied having hx of a-flutter, therefore, it's likely d/t valve surgery from 9/23.     Left hand, arm, and shoulder notably swollen and tender to the touch, per pt. Ultrasound of left arm was negative for DVT. Pain in left shoulder area adequately relieved with scheduled tylenol.    Pt's last two chest tubes removed this afternoon, follow-up portable CXR done two hours later. Sternal wound vac remains intact.    Weight down one pound from yesterday; pt continues to diurese with IV lasix. Purewick in place, otherwise pt using commode with assist of 1 and walker.     P: Anticipate discharge to ARU this Thursday or Friday. Continue to encourage and assist with ambulation as needed. Contact CVTS for any questions or concerns.    Penny Mckeon, RN  Cardiology

## 2020-09-29 NOTE — PROGRESS NOTES
Social Work Services Progress Note     Hospital Day: 7  Date of Initial Social Work Evaluation:  9/25/2020  Collaborated with:  [facility], [facility] admissions, chart review, [employer]     Data:  Per CVTS rounds, pt may be ready for discharge to ARU Thursday versus Friday. Chest tubes will be removed today.      Pt being referred to ARU and needs prior auth form [facility] Medicare Advantage insurance. SW submitted paperwork for auth (fax: [phone]).    SW updated [facility] admissions.     Intervention:  Discharge planning and coordination of care.        Assessment:  Pt remains hospitalized at this time     Plan:    Anticipated Disposition:  Facility:  ARU    Barriers to d/c plan:  Chest tubes, IV diuretics, prior auth, bed availability    Follow Up:  SW to continue to follow for discharge planning.        Juany RIEVRA, LICSW  6C Cardiology Unit   [facility]  Phone: [phone]  Pager: [phone]

## 2020-09-29 NOTE — PLAN OF CARE
Discharge Planner SLP   Patient plan for discharge: ARU  Current status: Pt seen to follow up with swallowing. Pt continues to tolerate regular diet without outward s/sx of aspiration.  Pt has met goals for swallowing and ST will sign off.  Recommend continue with regular diet and thin liquids. Pt indep with use of safe swallowing ex.  No further swallowing tx warranted  Barriers to return to prior living situation: None from SLP  Recommendations for discharge: Per OT and PT recs ARU  Rationale for recommendations: Pt continues to have PT and OT needs for ARU    Entered by: Veronika Gimenez 09/29/2020 1:52 PM    Speech Language Therapy Discharge Summary    Reason for therapy discharge:    All goals and outcomes met, no further needs identified.    Progress towards therapy goal(s). See goals on Care Plan in [facility] electronic health record for goal details.  Goals met    Therapy recommendation(s):    No further therapy is recommended. Pt tolerating regular diet and thin liquids at time ST signed off.

## 2020-09-29 NOTE — PLAN OF CARE
6C OT: Cancel     Pt w/multiple providers this AM, PT this PM. Will cancel and reschedule per POC.

## 2020-09-29 NOTE — PLAN OF CARE
Temp: 98.3  F (36.8  C) Temp src: Oral BP: 117/48 Pulse: 79   Resp: 16 SpO2: 96 % O2 Device: None (Room air)       D: Admitted for redo AVR 9/23. Hx aortic stenosis, HTN, GERD, glaucoma, HLD     I/A: Joseline (she/her) is A&O x4. Tele in place, SR. VSS on RA. PIV in place, SL. CT x2 in place, scant output overnight. Wound vac to sternal incision, no output. No c/o pain this shift. Up with assist x1. Slept well overnight     P: Continue to monitor and follow POC. Discharge to ARU when ready. Notify CVTS with changes

## 2020-09-29 NOTE — PLAN OF CARE
PT 6C: Cancel- patient off unit and with other providers upon multiple attempts today. Will reschedule.

## 2020-09-30 ENCOUNTER — HOSPITAL ENCOUNTER (INPATIENT)
Facility: CLINIC | Age: 85
End: 2020-09-30
Payer: COMMERCIAL

## 2020-09-30 ENCOUNTER — APPOINTMENT (OUTPATIENT)
Dept: OCCUPATIONAL THERAPY | Facility: CLINIC | Age: 85
DRG: 228 | End: 2020-09-30
Attending: SURGERY
Payer: COMMERCIAL

## 2020-09-30 ENCOUNTER — APPOINTMENT (OUTPATIENT)
Dept: GENERAL RADIOLOGY | Facility: CLINIC | Age: 85
DRG: 228 | End: 2020-09-30
Attending: PHYSICIAN ASSISTANT
Payer: COMMERCIAL

## 2020-09-30 ENCOUNTER — APPOINTMENT (OUTPATIENT)
Dept: PHYSICAL THERAPY | Facility: CLINIC | Age: 85
DRG: 228 | End: 2020-09-30
Attending: SURGERY
Payer: COMMERCIAL

## 2020-09-30 LAB
ANION GAP SERPL CALCULATED.3IONS-SCNC: 4 MMOL/L (ref 3–14)
BUN SERPL-MCNC: 23 MG/DL (ref 7–30)
CALCIUM SERPL-MCNC: 8.8 MG/DL (ref 8.5–10.1)
CHLORIDE SERPL-SCNC: 110 MMOL/L (ref 94–109)
CO2 SERPL-SCNC: 26 MMOL/L (ref 20–32)
CREAT SERPL-MCNC: 0.99 MG/DL (ref 0.52–1.04)
ERYTHROCYTE [DISTWIDTH] IN BLOOD BY AUTOMATED COUNT: 13.3 % (ref 10–15)
GFR SERPL CREATININE-BSD FRML MDRD: 52 ML/MIN/{1.73_M2}
GLUCOSE SERPL-MCNC: 100 MG/DL (ref 70–99)
HCT VFR BLD AUTO: 29.6 % (ref 35–47)
HGB BLD-MCNC: 9.2 G/DL (ref 11.7–15.7)
INR PPP: 2.3 (ref 0.86–1.14)
MCH RBC QN AUTO: 32.2 PG (ref 26.5–33)
MCHC RBC AUTO-ENTMCNC: 31.1 G/DL (ref 31.5–36.5)
MCV RBC AUTO: 104 FL (ref 78–100)
PLATELET # BLD AUTO: 355 10E9/L (ref 150–450)
POTASSIUM SERPL-SCNC: 4.3 MMOL/L (ref 3.4–5.3)
RBC # BLD AUTO: 2.86 10E12/L (ref 3.8–5.2)
SODIUM SERPL-SCNC: 141 MMOL/L (ref 133–144)
WBC # BLD AUTO: 9.2 10E9/L (ref 4–11)

## 2020-09-30 PROCEDURE — 21400000 ZZH R&B CCU UMMC

## 2020-09-30 PROCEDURE — 25000132 ZZH RX MED GY IP 250 OP 250 PS 637: Performed by: SURGERY

## 2020-09-30 PROCEDURE — 71046 X-RAY EXAM CHEST 2 VIEWS: CPT

## 2020-09-30 PROCEDURE — 25000132 ZZH RX MED GY IP 250 OP 250 PS 637: Performed by: STUDENT IN AN ORGANIZED HEALTH CARE EDUCATION/TRAINING PROGRAM

## 2020-09-30 PROCEDURE — 36415 COLL VENOUS BLD VENIPUNCTURE: CPT | Performed by: PHYSICIAN ASSISTANT

## 2020-09-30 PROCEDURE — 40000802 ZZH SITE CHECK

## 2020-09-30 PROCEDURE — 85049 AUTOMATED PLATELET COUNT: CPT | Performed by: STUDENT IN AN ORGANIZED HEALTH CARE EDUCATION/TRAINING PROGRAM

## 2020-09-30 PROCEDURE — 25000132 ZZH RX MED GY IP 250 OP 250 PS 637: Performed by: PHYSICIAN ASSISTANT

## 2020-09-30 PROCEDURE — 85027 COMPLETE CBC AUTOMATED: CPT | Performed by: PHYSICIAN ASSISTANT

## 2020-09-30 PROCEDURE — 40000141 ZZH STATISTIC PERIPHERAL IV START W/O US GUIDANCE

## 2020-09-30 PROCEDURE — 97535 SELF CARE MNGMENT TRAINING: CPT | Mod: GO | Performed by: OCCUPATIONAL THERAPIST

## 2020-09-30 PROCEDURE — 80048 BASIC METABOLIC PNL TOTAL CA: CPT | Performed by: PHYSICIAN ASSISTANT

## 2020-09-30 PROCEDURE — 97116 GAIT TRAINING THERAPY: CPT | Mod: GP | Performed by: REHABILITATION PRACTITIONER

## 2020-09-30 PROCEDURE — 85610 PROTHROMBIN TIME: CPT | Performed by: PHYSICIAN ASSISTANT

## 2020-09-30 RX ORDER — WARFARIN SODIUM 4 MG/1
4 TABLET ORAL
Status: COMPLETED | OUTPATIENT
Start: 2020-09-30 | End: 2020-09-30

## 2020-09-30 RX ORDER — HYDROXYZINE HYDROCHLORIDE 10 MG/1
10 TABLET, FILM COATED ORAL
Status: DISCONTINUED | OUTPATIENT
Start: 2020-09-30 | End: 2020-10-02 | Stop reason: HOSPADM

## 2020-09-30 RX ORDER — FUROSEMIDE 40 MG
40 TABLET ORAL
Status: DISCONTINUED | OUTPATIENT
Start: 2020-09-30 | End: 2020-10-01

## 2020-09-30 RX ADMIN — DORZOLAMIDE HYDROCHLORIDE AND TIMOLOL MALEATE 1 DROP: 20; 5 SOLUTION/ DROPS OPHTHALMIC at 08:14

## 2020-09-30 RX ADMIN — POTASSIUM CHLORIDE 20 MEQ: 750 TABLET, EXTENDED RELEASE ORAL at 20:50

## 2020-09-30 RX ADMIN — ACETAMINOPHEN 975 MG: 325 TABLET, FILM COATED ORAL at 15:49

## 2020-09-30 RX ADMIN — FUROSEMIDE 40 MG: 40 TABLET ORAL at 08:30

## 2020-09-30 RX ADMIN — POTASSIUM CHLORIDE 20 MEQ: 750 TABLET, EXTENDED RELEASE ORAL at 08:12

## 2020-09-30 RX ADMIN — FUROSEMIDE 40 MG: 40 TABLET ORAL at 15:49

## 2020-09-30 RX ADMIN — ASPIRIN 81 MG CHEWABLE TABLET 81 MG: 81 TABLET CHEWABLE at 08:12

## 2020-09-30 RX ADMIN — HYDROXYZINE HYDROCHLORIDE 10 MG: 10 TABLET ORAL at 22:14

## 2020-09-30 RX ADMIN — WARFARIN SODIUM 4 MG: 4 TABLET ORAL at 17:38

## 2020-09-30 RX ADMIN — LATANOPROST 1 DROP: 50 SOLUTION OPHTHALMIC at 20:57

## 2020-09-30 RX ADMIN — ACETAMINOPHEN 975 MG: 325 TABLET, FILM COATED ORAL at 20:50

## 2020-09-30 RX ADMIN — METOPROLOL TARTRATE 25 MG: 25 TABLET, FILM COATED ORAL at 08:12

## 2020-09-30 RX ADMIN — SIMVASTATIN 20 MG: 20 TABLET, FILM COATED ORAL at 22:14

## 2020-09-30 RX ADMIN — Medication 1 MG: at 22:14

## 2020-09-30 RX ADMIN — PANTOPRAZOLE SODIUM 40 MG: 40 TABLET, DELAYED RELEASE ORAL at 08:12

## 2020-09-30 RX ADMIN — SENNOSIDES 8.6 MG: 8.6 TABLET, FILM COATED ORAL at 08:13

## 2020-09-30 RX ADMIN — METOPROLOL TARTRATE 25 MG: 25 TABLET, FILM COATED ORAL at 20:50

## 2020-09-30 RX ADMIN — DORZOLAMIDE HYDROCHLORIDE AND TIMOLOL MALEATE 1 DROP: 20; 5 SOLUTION/ DROPS OPHTHALMIC at 20:50

## 2020-09-30 RX ADMIN — Medication 1 MG: at 00:12

## 2020-09-30 RX ADMIN — ACETAMINOPHEN 975 MG: 325 TABLET, FILM COATED ORAL at 08:12

## 2020-09-30 ASSESSMENT — ACTIVITIES OF DAILY LIVING (ADL)
ADLS_ACUITY_SCORE: 15
ADLS_ACUITY_SCORE: 14
ADLS_ACUITY_SCORE: 15

## 2020-09-30 ASSESSMENT — MIFFLIN-ST. JEOR: SCORE: 1193.93

## 2020-09-30 NOTE — PROGRESS NOTES
"Social Work Services Progress Note     Hospital Day: 8  Date of Initial Social Work Evaluation:  9/25/2020  Collaborated with:  CVTS, [facility] admissions, chart review, [employer]     Data:  Per CVTS rounds, pt may be ready for discharge to ARU Thursday versus Friday. EP has been consulted for flutter and possible cardioversion before discharge.      Pt being referred to ARU and needs prior auth form [facility] Medicare Advantage insurance. SW resubmitted paperwork for auth (fax: [phone]), as original paperwork sent yesterday was not accepted as pt's name on her [employer] account has her nickname \"Joseline\" rather than full name \"Monika\".     SW updated [facility] admissions.     Intervention:  Discharge planning and coordination of care.        Assessment:  Pt remains hospitalized at this time     Plan:    Anticipated Disposition:  Facility:  [facility]    Barriers to d/c plan:  Chest tubes, IV diuretics, prior auth, bed availability    Follow Up:  SW to continue to follow for discharge planning.        Juany RIVERA, LICSW  6C Cardiology Unit    [facility]  Phone: [phone]  Pager: [phone]  "

## 2020-09-30 NOTE — PLAN OF CARE
Discharge Planner OT   Patient plan for discharge: ARU  Current status: Pt ambulating to and from bathroom with SBA using 2WW.  Pt standing at sink to complete grooming and hygiene tasks while standing at sink to build endurance and strength  Barriers to return to prior living situation: Decreased endurance, precautions  Recommendations for discharge: ARU  Rationale for recommendations: Pt will benefit from continuing skilled OT services to progress functional endurance and independence with ADL, Pt would likely tolerate 3 hrs of therapy a day       Entered by: David Landis 09/30/2020 1:21 PM

## 2020-09-30 NOTE — PLAN OF CARE
6C PT  Discharge Planner PT   Patient plan for discharge: ARU  Current status: Min A x1 progressing to CGA for sit<>stand transfers. Ambulated 100ft, 50ft, 100ft w/CGA and FWW. Seated rest breaks between bouts. Patient demonstrates good awareness of sternal precautions throughout mobility. Patient eager to participate in therapy. VSS on RA throughout session.   Barriers to return to prior living situation: Medical status, post-op precautions, decreased activity tolerance  Recommendations for discharge: ARU  Rationale for recommendations: Pt below baseline and motivated to return to PLOF. Would benefit from intensive rehab services to maximize safety and independence with functional mobility. Pt would tolerate 3hrs of therapy per day.

## 2020-09-30 NOTE — PLAN OF CARE
VSS on RA. Juan 100-130s.  Denies pain. Pacer wires capped, in place. Wound vac dc'd and removed today, guaze dressing in place. IV lasix switch to PO, adequate UO, some output uncharted d/t incontinence. Up in chair and working with therapies, will continue POC, possible discharge to ARU on Thursday or Friday, EP consulted for juan.

## 2020-09-30 NOTE — PLAN OF CARE
D: Pt admitted 9/23, s/p AVR re-do and lysis of adhesions.   PMH: aortic stenosis-s/p AVR and PFO closure 2004, HTN, GERD, glaucoma, and HLD.    I: Monitored vitals and assessed pt status.   Running: PIV SL'd  PRN: Melatonin 1mg PO    A: A0x4. VSS, on RA. Monitor AFlutter  with r/PVC and r/PVC cplt. Pain controlled with scheduled tylenol. Last BM 9/29. Purewick in place- good UO. Wound vac in place to sternal incision. Dressings CDI.  Slept fair after melatonin.    No intake/output data recorded.    Temp:  [97.6  F (36.4  C)-98.8  F (37.1  C)] 98.8  F (37.1  C)  Pulse:  [] 79  Resp:  [16-18] 18  BP: (109-135)/(53-77) 109/53  SpO2:  [93 %-96 %] 94 %      P: Continue to monitor Pt status and report changes to treatment team. Plan for ARU Thursday or Friday.

## 2020-09-30 NOTE — PROGRESS NOTES
"CLINICAL NUTRITION SERVICES - ASSESSMENT NOTE     Nutrition Prescription    RECOMMENDATIONS FOR MDs/PROVIDERS TO ORDER:  None at this time     Malnutrition Status:    Non-severe malnutrition in the context of chronic illness    Recommendations already ordered by Registered Dietitian (RD):  Magic cup with dinner     Future/Additional Recommendations:  -- monitor tolerance/acceptance of magic cup   -- monitor oral intake      REASON FOR ASSESSMENT  Monika Bee is a/an 84 year old female assessed by the dietitian for LOS    NUTRITION HISTORY  Per chart review patient with history of bicuspid aortic valve with severe aortic stenosis s/p replacement with St Romeo mechanical prosthesis and patent foramen ovale s/p repair (2004), HTN, and GERD    Speech therapy recommends to continue regular diet with thin liquids (9/29- signed off)     Patient reports decreased appetite since admit. She reports eating ~ 50% of entree on meals and fruit plate. She typically eats 2 meals daily (breakfast and dinner)    CURRENT NUTRITION ORDERS  Diet: Regular  Intake/Tolerance: Per nursing documentation po intake > 50% of meals     LABS  Labs reviewed    MEDICATIONS  Medications reviewed  Lasix  Klor-con m  Senokot    ANTHROPOMETRICS  Height: 151.1 cm (4' 11.488\")  Most Recent Weight: 82.1 kg (180 lb 14.4 oz)    IBW: 43.77 kg  BMI: Obesity Grade II BMI 35-39.9  Weight History:   Wt Readings from Last 10 Encounters:   09/30/20 82.1 kg (180 lb 14.4 oz)   09/21/20 79.8 kg (176 lb)   09/14/20 82.2 kg (181 lb 3.5 oz)   admit weight 80.2 kg (9/23)  Dosing Weight: 53 kg (adjBW based on IBW and admit weight)    ASSESSED NUTRITION NEEDS  Estimated Energy Needs: 9857-9161 kcals/day (20 - 25 kcals/kg)  Justification: Obese  Estimated Protein Needs: 64-80 grams protein/day (1.2 - 1.5 grams of pro/kg)  Justification: Obesity guidelines  Estimated Fluid Needs: 1 mL/kcal  Justification: Maintenance    PHYSICAL FINDINGS  See malnutrition section " below.  GI: ~ 1 BM daily   WOC nurse note ()  cleft wounds due to Intertrigo - Status: initial assessment; Right axilla wounds due to Fungal- Status: initial assessment    MALNUTRITION  % Intake: < 75% for > 7 days (non-severe)  % Weight Loss: None noted  Subcutaneous Fat Loss: None observed  Muscle Loss: None observed  Fluid Accumulation/Edema: Mild  Malnutrition Diagnosis: Non-severe malnutrition in the context of chronic illness    NUTRITION DIAGNOSIS  Inadequate oral intake related to decreased appetite as evidenced by PO intake ~ 50% of BID meals      INTERVENTIONS  Implementation  1. Nutrition Education: See education flowsheet   2 Medical food supplement therapy - magic cup     Goals  Patient to consume % of nutritionally adequate meal trays TID, or the equivalent with supplements/snacks.     Monitoring/Evaluation  Progress toward goals will be monitored and evaluated per protocol.    Ernestina Valadez, MS/RD/[facility]  [address]C RD Pager: [phone]

## 2020-09-30 NOTE — PROGRESS NOTES
Cardiovascular Surgery Progress Note  09/30/2020           Assessment and Plan:   Monika Bee is a 84 year old female with a PMH of bicuspid aortic valve with severe aortic stenosis s/p replacement with bioprosthesis and patent foramen ovale s/p repair (2004), HTN, and GERD developed aortic valve restenosis,  s/p redo sternotomy with aortic valve replacement with 19 mm [employer] [place] mechanical prosthesis on 09/23/20 with Dr. Rainey. Post op echo with mild RV dysfunction . Extubated on 9/24.      Cardiovascular:   Severe aortic stenosis of bioprosthetic aortic valve - s/p redo sternotomy AVR (mechanical)  HTN  HLD  Pre-op WESLEY echo on 7/29 showed LVEF 65% with aortic stenosis. Post-operative echo on 9/29 with LVEF 60-65% with mechanical AV gradient without AI.  - ASA 81, simvastatin 20 mg at bedtime, metoprolol 25 mg BID  - Coumadin for mechanical aortic valve, INR therapeutic, no bridging per Dr. Rainey  - Mediastinal tubes removed 9/28, bilateral pleural tubes split 9/28, CXR stable, removed bilateral pleural tubes on 9/29, follow-up CXR stable. Repeat baseline CXR 9/30 stable  - TPW capped, will need to cut at discharge given INR  Atrial flutter variable AV block  Went into aflutter 9/29, rates in 90s to 100s. Patient not having any symptoms. EKG obtained confirming aflutter, but also showed STEMI. Patient without any chest pain or SOB. Pre-op coronary arteries without disease. Curbsided cardiology, recommended echo. Echo on 9/29 without wall motion abnormalities, normal LVEF.   - Consult EP for consideration of cardioversion    Pulmonary:  Extubated POD 1; Saturating well on RA.  Encourage IS, C&DB, ambulating    Neuro/MSK:  Neuro intact  Acute post-operative pain   - Pain well controlled with IV dilaudid, PO oxycodone    /Renal:  Baseline Creatinine WNL, Pre-op weight 176 lbs, Creatinine today stable  Diuresis - hypervolemic on lasix 20 mg IV BID, gave extra dose of 20 mg IV x1 on 9/28 and 9/29 (60 mg IV  total daily), Weight down nicely  - Transition to lasix 40 mg PO BID with scheduled K on 9/30, reassess in am    GI/FEN:   Regular diet, continue bowel regimen, + BM  Replace lytes as needed    Endo:  Stress induced hyperglycemia   Managed on insulin drip postop, transitioned to sliding scale goal BG <180  - Discontinued glucose checks    Heme:   Acute blood loss anemia and thrombocytopenia  - Hgb stable; Plt WNL, no signs or symptoms of bleeding    ID:  Stress induced leukocytosis   - WBC WNL, afebrile, no signs or symptoms of infection  - Completed perioperative antibiotics    Prophylaxis:   Stress ulcer prophylaxis: Pantoprazole 40 mg daily  DVT prophylaxis: Subcutaneous heparin, PCD    Dispo:   Transferred to  on 9/27  Rehab recommending discharge to ARU, pending EP evaluation and diuresis    Staff surgeons have been informed of changes through both verbal and written communication.       Jorge Reyes PA-C  Cardiothoracic Surgery  p: [phone]          Interval History:   No acute events overnight. Continues in aflutter, rates controlled. Feels a little more fatigued today, had a busy day yesterday. Patient denies any chest pain/pressure, SOB, orthopnea, palpitations, lightheadedness, dizziness. LUE edema is improving. States pain is well managed on current regimen. Tolerating diet, appetite is okay, is passing flatus, + BM. Denies syncopal symptoms, chills, myalgias or sternal popping/clicking.          Medications:       acetaminophen  975 mg Oral TID     aspirin  81 mg Oral Daily     dorzolamide-timolol  1 drop Both Eyes BID     furosemide  40 mg Oral BID     latanoprost  1 drop Both Eyes At Bedtime     metoprolol tartrate  25 mg Oral BID     pantoprazole  40 mg Oral QAM AC     potassium chloride  20 mEq Oral BID     sennosides  8.6 mg Oral BID     simvastatin  20 mg Oral At Bedtime     sodium chloride (PF)  10 mL Intravenous Once     sodium chloride (PF)  3 mL Intracatheter Q8H     - MEDICATION  "INSTRUCTIONS -, carboxymethylcellulose PF, dextrose, glucose **OR** dextrose **OR** glucagon, hydrALAZINE, HYDROmorphone, lidocaine 4%, lidocaine (buffered or not buffered), magnesium sulfate, magnesium sulfate, melatonin, naloxone, - MEDICATION INSTRUCTIONS -, oxyCODONE, potassium chloride, potassium chloride with lidocaine, potassium chloride, potassium chloride, potassium chloride, potassium phosphate (KPHOS) in D5W IV, potassium phosphate (KPHOS) in D5W IV, potassium phosphate (KPHOS) in D5W IV, potassium phosphate (KPHOS) in D5W IV, potassium phosphate (KPHOS) in D5W IV, sodium chloride (PF), Warfarin Therapy Reminder          Physical Exam:   Vitals were reviewed  Blood pressure 127/62, pulse 104, temperature 98.2  F (36.8  C), temperature source Oral, resp. rate 18, height 1.511 m (4' 11.49\"), weight 82.1 kg (180 lb 14.4 oz), SpO2 93 %.  Vitals:    09/28/20 1142 09/29/20 0900 09/30/20 0628   Weight: 84.1 kg (185 lb 6.4 oz) 83.6 kg (184 lb 6.4 oz) 82.1 kg (180 lb 14.4 oz)      I/O last 3 completed shifts:  In: 1060 [P.O.:1060]  Out: 2260 [Urine:2200; Chest Tube:60]    Gen: A&Ox4, NAD  CV: RRR, normal S1, S2, no murmurs, rubs or gallops. Valve click present  Pulm: Lungs diminished in bases, otherwise CTA, no wheezing or rhonchi  Abd: Soft, NT, ND, +BS  Ext: 1+ LE edema, 1+ LUE edema, improving  Neuro: Nonfocal  Incision: c/d/i, no erythema, sternum stable  Tubes/drains: Dressing clean and dry.          Data:    All labs and imaging reviewed by me.  Labs:    Data   Recent Labs   Lab 09/30/20  0540 09/29/20  0530 09/28/20  0515  09/24/20  0401  09/23/20  1456   WBC 9.2 7.6 7.1   < > 14.3*  --  17.7*   HGB 9.2* 9.2* 8.3*   < > 9.8*   < > 11.8   * 104* 106*   < > 100  --  98    291 230   < > 187  --  212   INR 2.30* 2.02* 1.91*   < >  --   --  1.38*    142 143   < > 145*  --  142   POTASSIUM 4.3 4.0 3.5   < > 3.6  --  4.0   CHLORIDE 110* 111* 111*   < > 113*  --  113*   CO2 26 26 25   < > 23  -- "  24   BUN 23 25 26   < > 17  --  13   CR 0.99 0.93 0.97   < > 0.99  --  0.73   ANIONGAP 4 5 7   < > 8  --  5   RYANN 8.8 8.4* 8.3*   < > 8.3*  --  8.2*   * 101* 91   < > 140*  --  158*   ALBUMIN  --   --   --   --  3.4  --  2.5*   PROTTOTAL  --   --   --   --  5.6*  --  5.3*   BILITOTAL  --   --   --   --  1.1  --  1.2   ALKPHOS  --   --   --   --  50  --  54   ALT  --   --   --   --  15  --  23   AST  --   --   --   --  41  --  Canceled, Test credited    < > = values in this interval not displayed.     Recent Labs   Lab 09/30/20  0540 09/29/20  0530 09/28/20  1439 09/28/20  0515 09/27/20  2115 09/27/20  1702 09/27/20  1232 09/27/20  0733 09/27/20  0424 09/26/20  1933  09/26/20  0324  09/25/20  0324   * 101*  --  91  --   --   --  93  --   --   --  100*  --  102*   BGM  --   --  112*  --  103* 87 121*  --  83 126*   < >  --    < >  --     < > = values in this interval not displayed.        Imaging:  Recent Results (from the past 24 hour(s))   XR Chest 2 Views    Narrative    EXAM: XR CHEST 2 VW  9/29/2020 10:50 AM     HISTORY:  interval       COMPARISON:  Chest x-ray 9/28/2020    FINDINGS:   Upright PA and lateral views of the chest. Sternotomy wires in place.  Aortic valve prosthesis in unchanged position. Left chest wall loop  recorder. Bilateral chest tubes in place.    The trachea is midline. The cardiac silhouette is stable. Trace right  pleural effusion. No focal consolidative opacity, pleural effusion, or  appreciable pneumothorax.      Impression    IMPRESSION:   Devices in appropriate position. No new focal consolidation. Trace  right pleural effusion.    I have personally reviewed the examination and initial interpretation  and I agree with the findings.    LYLE HANDLEY MD   US Upper Extremity Venous Duplex Left    Narrative    LEFT UPPER EXTREMITY DUPLEX VENOUS ULTRASOUND 9/29/2020    CLINICAL HISTORY: LUE edema, eval for DVT.     COMPARISONS: Chest CT 9/21/2020    REFERRING PROVIDER: KILO  GERALD METZ    TECHNIQUE: Left internal jugular vein evaluated with grayscale, color  Doppler, Doppler waveform ultrasound. Left innominate, subclavian, and  axillary veins evaluated with color Doppler and Doppler waveform  ultrasound. Left brachial, basilic, and cephalic veins evaluated with  grayscale imaging and compression.    FINDINGS: Left internal jugular vein is patent, fully compressible,  demonstrates normal phasic Doppler waveform.    Left innominate, subclavian, and axillary veins fill [id number] in  color Doppler images with normal phasic waveforms.    Left brachial, basilic, and cephalic veins are patent and fully  compressible.      Impression    IMPRESSION: No deep venous thrombosis demonstrated in the left arm.    KE ROONEY MD   Echo Complete    Narrative    [id number]  [id number]  [id number]  [id number]^ISAÍAS^KILO^GERALD           [facility],[place]  Echocardiography Laboratory  [address] [place] [id number]     Name: REYNA LOMBARDO  MRN: [id number]  : 1935  Study Date: 2020 01:55 PM  Age: 84 yrs  Gender: Female  Patient Location: [facility] [place]  Reason For Study: Aortic Valve Replacement (2020)  Ordering Physician: KILO METZ  Performed By: ALLYSON Guzman     BSA: 1.8 m2  Height: 59 in  Weight: 185 lb  BP: 111/59 mmHg  _____________________________________________________________________________  __        Procedure  Complete Portable Echo Adult. Contrast Optison. Technically difficult  study.Extremely poor acoustic windows. Limited information was obtained during  study. Optison (NDC #5672-6339-93) given intravenously. Patient was given 6 ml  mixture of 3 ml Optison and 6 ml saline. 3 ml wasted. IV start location R  Hand .  _____________________________________________________________________________  __        Interpretation Summary  Technically difficult study.Extremely poor acoustic windows.  Limited information was obtained during  study.  S/P AVR with 19mm St[employer] Rome[employer] [place] mechanical valveo on 9/25/2020. Mean  gradient 9mmHg. No AI.  Left ventricular function, chamber size, wall motion, and wall thickness are  normal.The EF is 60-65%.  No regional wall motion abnormalities are seen.  Right ventricular function is normal.  No pericardial effusion is present.  There is no prior study for direct comparison.        _____________________________________________________________________________  __        Left Ventricle  Left ventricular function, chamber size, wall motion, and wall thickness are  normal.The EF is 60-65%. Left ventricular diastolic function is not  assessable. No regional wall motion abnormalities are seen.     Right Ventricle  Right ventricular function, chamber size, wall motion, and thickness are  normal.     Atria  Both atria appear normal.        Mitral Valve  The valve leaflets are not well visualized. On Doppler interrogation, there is  no significant stenosis or regurgitation. Moderate mitral annular  calcification is present.     Aortic Valve  S/P AVR with 19mm St. Rome[employer] [place] mechanical valveo on 9/25/2020. Mean  gradient 9mmHg. No AI.     Tricuspid Valve  The tricuspid valve cannot be assessed. Pulmonary artery systolic pressure  cannot be assessed.     Pulmonic Valve  The valve leaflets are not well visualized. On Doppler interrogation, there is  no significant stenosis or regurgitation.     Vessels  The thoracic aorta cannot be assessed. The aorta root cannot be assessed. The  inferior vena cava cannot be assessed. The pulmonary artery cannot be  assessed.     Pericardium  No pericardial effusion is present.        Compared to Previous Study  There is no prior study for direct comparison.     Attestation  I have personally viewed the imaging and agree with the interpretation and  report as documented by the fellow, Tony Rios, and/or edited by  me.  _____________________________________________________________________________  __     MMode/2D Measurements & Calculations  IVSd: 0.88 cm  LVIDd: 3.3 cm  LVIDs: 2.3 cm  LVPWd: 1.1 cm  FS: 31.5 %  LV mass(C)d: 91.6 grams  LV mass(C)dI: 51.3 grams/m2  asc Aorta Diam: 3.2 cm  LVOT diam: 1.9 cm  LVOT area: 2.8 cm2  LA Volume (BP): 40.1 ml     LA Volume Index (BP): 22.5 ml/m2  RWT: 0.65        Doppler Measurements & Calculations  Ao V2 max: 213.3 cm/sec  Ao max P.2 mmHg  Ao V2 mean: 141.4 cm/sec  Ao mean P.2 mmHg  Ao V2 VTI: 35.3 cm  PA V2 max: 75.6 cm/sec  PA max P.3 mmHg  PA acc time: 0.12 sec     _____________________________________________________________________________  __           Report approved by: Robert Wong 2020 03:22 PM      XR Chest Port 1 View    Narrative    EXAM: XR CHEST PORT 1 VW  2020 3:43 PM     HISTORY:  chest tube removal       COMPARISON:  2020 at 1047 hours    TECHNIQUE: Single frontal radiograph of the chest    FINDINGS/    Impression    IMPRESSION:  Postsurgical chest, stable. Removal of right chest tube.  No appreciable pneumothorax.. Aortic atherosclerosis. Aortic valve  replacement.    I have personally reviewed the examination and initial interpretation  and I agree with the findings.    JOSE ROACH MD   XR Chest 2 Views    Narrative    Chest 2 views    INDICATION: Interval    COMPARISON: 2020    FINDINGS: Heart size and shape appear normal. Aortic arch  atherosclerosis. Median sternotomy again evident. Aortic valve  replacement again noted. Unchanged mild left and right costophrenic  angle blunting column likely scarring versus small chronic effusions.  Linear densities in the left mid lung laterally appears similar,  likely related to atelectasis.      Impression    IMPRESSION: Mild bibasilar scarring rather than small chronic pleural  effusions. Left lung subsegmental atelectasis. Aortic valve  replacement. Aortic  atherosclerosis.    JOSE ROACH MD

## 2020-10-01 ENCOUNTER — APPOINTMENT (OUTPATIENT)
Dept: OCCUPATIONAL THERAPY | Facility: CLINIC | Age: 85
DRG: 228 | End: 2020-10-01
Attending: SURGERY
Payer: COMMERCIAL

## 2020-10-01 LAB
ANION GAP SERPL CALCULATED.3IONS-SCNC: 5 MMOL/L (ref 3–14)
BUN SERPL-MCNC: 24 MG/DL (ref 7–30)
CALCIUM SERPL-MCNC: 8.4 MG/DL (ref 8.5–10.1)
CHLORIDE SERPL-SCNC: 110 MMOL/L (ref 94–109)
CO2 SERPL-SCNC: 27 MMOL/L (ref 20–32)
CREAT SERPL-MCNC: 1.14 MG/DL (ref 0.52–1.04)
ERYTHROCYTE [DISTWIDTH] IN BLOOD BY AUTOMATED COUNT: 13.4 % (ref 10–15)
GFR SERPL CREATININE-BSD FRML MDRD: 44 ML/MIN/{1.73_M2}
GLUCOSE SERPL-MCNC: 97 MG/DL (ref 70–99)
HCT VFR BLD AUTO: 28.2 % (ref 35–47)
HGB BLD-MCNC: 8.6 G/DL (ref 11.7–15.7)
INR PPP: 2.51 (ref 0.86–1.14)
INR PPP: 2.77 (ref 0.86–1.14)
LABORATORY COMMENT REPORT: NORMAL
MAGNESIUM SERPL-MCNC: 2.1 MG/DL (ref 1.6–2.3)
MCH RBC QN AUTO: 32.6 PG (ref 26.5–33)
MCHC RBC AUTO-ENTMCNC: 30.5 G/DL (ref 31.5–36.5)
MCV RBC AUTO: 107 FL (ref 78–100)
PLATELET # BLD AUTO: 394 10E9/L (ref 150–450)
POTASSIUM SERPL-SCNC: 4.4 MMOL/L (ref 3.4–5.3)
POTASSIUM SERPL-SCNC: 4.7 MMOL/L (ref 3.4–5.3)
RBC # BLD AUTO: 2.64 10E12/L (ref 3.8–5.2)
SARS-COV-2 RNA SPEC QL NAA+PROBE: NEGATIVE
SARS-COV-2 RNA SPEC QL NAA+PROBE: NORMAL
SODIUM SERPL-SCNC: 142 MMOL/L (ref 133–144)
SPECIMEN SOURCE: NORMAL
SPECIMEN SOURCE: NORMAL
WBC # BLD AUTO: 6.2 10E9/L (ref 4–11)

## 2020-10-01 PROCEDURE — 80048 BASIC METABOLIC PNL TOTAL CA: CPT | Performed by: PHYSICIAN ASSISTANT

## 2020-10-01 PROCEDURE — 85027 COMPLETE CBC AUTOMATED: CPT | Performed by: PHYSICIAN ASSISTANT

## 2020-10-01 PROCEDURE — 83735 ASSAY OF MAGNESIUM: CPT | Performed by: PHYSICIAN ASSISTANT

## 2020-10-01 PROCEDURE — 93005 ELECTROCARDIOGRAM TRACING: CPT

## 2020-10-01 PROCEDURE — 99222 1ST HOSP IP/OBS MODERATE 55: CPT | Performed by: NURSE PRACTITIONER

## 2020-10-01 PROCEDURE — 36415 COLL VENOUS BLD VENIPUNCTURE: CPT | Performed by: PHYSICIAN ASSISTANT

## 2020-10-01 PROCEDURE — 85610 PROTHROMBIN TIME: CPT | Performed by: PHYSICIAN ASSISTANT

## 2020-10-01 PROCEDURE — 93010 ELECTROCARDIOGRAM REPORT: CPT | Mod: 76 | Performed by: INTERNAL MEDICINE

## 2020-10-01 PROCEDURE — 250N000013 HC RX MED GY IP 250 OP 250 PS 637: Performed by: SURGERY

## 2020-10-01 PROCEDURE — 214N000001 HC R&B CCU UMMC

## 2020-10-01 PROCEDURE — 85610 PROTHROMBIN TIME: CPT | Performed by: NURSE PRACTITIONER

## 2020-10-01 PROCEDURE — 36415 COLL VENOUS BLD VENIPUNCTURE: CPT | Performed by: NURSE PRACTITIONER

## 2020-10-01 PROCEDURE — 250N000013 HC RX MED GY IP 250 OP 250 PS 637: Performed by: PHYSICIAN ASSISTANT

## 2020-10-01 PROCEDURE — 250N000013 HC RX MED GY IP 250 OP 250 PS 637: Performed by: STUDENT IN AN ORGANIZED HEALTH CARE EDUCATION/TRAINING PROGRAM

## 2020-10-01 PROCEDURE — 83735 ASSAY OF MAGNESIUM: CPT | Performed by: NURSE PRACTITIONER

## 2020-10-01 PROCEDURE — U0003 INFECTIOUS AGENT DETECTION BY NUCLEIC ACID (DNA OR RNA); SEVERE ACUTE RESPIRATORY SYNDROME CORONAVIRUS 2 (SARS-COV-2) (CORONAVIRUS DISEASE [COVID-19]), AMPLIFIED PROBE TECHNIQUE, MAKING USE OF HIGH THROUGHPUT TECHNOLOGIES AS DESCRIBED BY CMS-2020-01-R: HCPCS | Performed by: PHYSICIAN ASSISTANT

## 2020-10-01 PROCEDURE — 84132 ASSAY OF SERUM POTASSIUM: CPT | Performed by: NURSE PRACTITIONER

## 2020-10-01 PROCEDURE — 97110 THERAPEUTIC EXERCISES: CPT | Mod: GO | Performed by: OCCUPATIONAL THERAPIST

## 2020-10-01 RX ORDER — NALOXONE HYDROCHLORIDE 0.4 MG/ML
.1-.4 INJECTION, SOLUTION INTRAMUSCULAR; INTRAVENOUS; SUBCUTANEOUS
Status: DISCONTINUED | OUTPATIENT
Start: 2020-10-01 | End: 2020-10-01 | Stop reason: CLARIF

## 2020-10-01 RX ORDER — FENTANYL CITRATE 50 UG/ML
25 INJECTION, SOLUTION INTRAMUSCULAR; INTRAVENOUS
Status: DISCONTINUED | OUTPATIENT
Start: 2020-10-01 | End: 2020-10-01 | Stop reason: CLARIF

## 2020-10-01 RX ORDER — MAGNESIUM SULFATE HEPTAHYDRATE 40 MG/ML
2 INJECTION, SOLUTION INTRAVENOUS
Status: DISCONTINUED | OUTPATIENT
Start: 2020-10-01 | End: 2020-10-02 | Stop reason: HOSPADM

## 2020-10-01 RX ORDER — LIDOCAINE 40 MG/G
CREAM TOPICAL
Status: DISCONTINUED | OUTPATIENT
Start: 2020-10-01 | End: 2020-10-01 | Stop reason: CLARIF

## 2020-10-01 RX ORDER — POTASSIUM CHLORIDE 750 MG/1
10 TABLET, EXTENDED RELEASE ORAL DAILY
Status: DISCONTINUED | OUTPATIENT
Start: 2020-10-01 | End: 2020-10-02 | Stop reason: HOSPADM

## 2020-10-01 RX ORDER — ACYCLOVIR 200 MG/1
9.5 CAPSULE ORAL
Status: DISCONTINUED | OUTPATIENT
Start: 2020-10-01 | End: 2020-10-01 | Stop reason: CLARIF

## 2020-10-01 RX ORDER — SODIUM CHLORIDE 9 MG/ML
INJECTION, SOLUTION INTRAVENOUS CONTINUOUS PRN
Status: DISCONTINUED | OUTPATIENT
Start: 2020-10-01 | End: 2020-10-01 | Stop reason: CLARIF

## 2020-10-01 RX ORDER — POTASSIUM CHLORIDE 750 MG/1
20 TABLET, EXTENDED RELEASE ORAL
Status: DISCONTINUED | OUTPATIENT
Start: 2020-10-01 | End: 2020-10-02 | Stop reason: HOSPADM

## 2020-10-01 RX ORDER — WARFARIN SODIUM 1 MG/1
2 TABLET ORAL
Status: COMPLETED | OUTPATIENT
Start: 2020-10-01 | End: 2020-10-01

## 2020-10-01 RX ORDER — FLUMAZENIL 0.1 MG/ML
0.2 INJECTION, SOLUTION INTRAVENOUS
Status: DISCONTINUED | OUTPATIENT
Start: 2020-10-01 | End: 2020-10-02 | Stop reason: HOSPADM

## 2020-10-01 RX ORDER — FUROSEMIDE 20 MG
20 TABLET ORAL DAILY
Status: DISCONTINUED | OUTPATIENT
Start: 2020-10-01 | End: 2020-10-02 | Stop reason: HOSPADM

## 2020-10-01 RX ORDER — LIDOCAINE 40 MG/G
CREAM TOPICAL
Status: DISCONTINUED | OUTPATIENT
Start: 2020-10-01 | End: 2020-10-02 | Stop reason: HOSPADM

## 2020-10-01 RX ORDER — POTASSIUM CHLORIDE 750 MG/1
40 TABLET, EXTENDED RELEASE ORAL
Status: DISCONTINUED | OUTPATIENT
Start: 2020-10-01 | End: 2020-10-02 | Stop reason: HOSPADM

## 2020-10-01 RX ORDER — LIDOCAINE HYDROCHLORIDE 20 MG/ML
15 SOLUTION OROPHARYNGEAL ONCE
Status: DISCONTINUED | OUTPATIENT
Start: 2020-10-01 | End: 2020-10-02 | Stop reason: HOSPADM

## 2020-10-01 RX ADMIN — ASPIRIN 81 MG CHEWABLE TABLET 81 MG: 81 TABLET CHEWABLE at 08:53

## 2020-10-01 RX ADMIN — ACETAMINOPHEN 975 MG: 325 TABLET, FILM COATED ORAL at 21:00

## 2020-10-01 RX ADMIN — DORZOLAMIDE HYDROCHLORIDE AND TIMOLOL MALEATE 1 DROP: 20; 5 SOLUTION/ DROPS OPHTHALMIC at 21:00

## 2020-10-01 RX ADMIN — ACETAMINOPHEN 975 MG: 325 TABLET, FILM COATED ORAL at 13:48

## 2020-10-01 RX ADMIN — DORZOLAMIDE HYDROCHLORIDE AND TIMOLOL MALEATE 1 DROP: 20; 5 SOLUTION/ DROPS OPHTHALMIC at 08:54

## 2020-10-01 RX ADMIN — PANTOPRAZOLE SODIUM 40 MG: 40 TABLET, DELAYED RELEASE ORAL at 08:54

## 2020-10-01 RX ADMIN — SIMVASTATIN 20 MG: 20 TABLET, FILM COATED ORAL at 21:24

## 2020-10-01 RX ADMIN — METOPROLOL TARTRATE 25 MG: 25 TABLET, FILM COATED ORAL at 21:00

## 2020-10-01 RX ADMIN — FUROSEMIDE 20 MG: 20 TABLET ORAL at 08:53

## 2020-10-01 RX ADMIN — POTASSIUM CHLORIDE 10 MEQ: 750 TABLET, EXTENDED RELEASE ORAL at 08:53

## 2020-10-01 RX ADMIN — LATANOPROST 1 DROP: 50 SOLUTION OPHTHALMIC at 21:00

## 2020-10-01 RX ADMIN — Medication 1 MG: at 21:24

## 2020-10-01 RX ADMIN — METOPROLOL TARTRATE 25 MG: 25 TABLET, FILM COATED ORAL at 08:53

## 2020-10-01 RX ADMIN — ACETAMINOPHEN 975 MG: 325 TABLET, FILM COATED ORAL at 08:53

## 2020-10-01 RX ADMIN — WARFARIN SODIUM 2 MG: 1 TABLET ORAL at 18:41

## 2020-10-01 ASSESSMENT — ACTIVITIES OF DAILY LIVING (ADL)
ADLS_ACUITY_SCORE: 15
ADLS_ACUITY_SCORE: 16
ADLS_ACUITY_SCORE: 14
ADLS_ACUITY_SCORE: 16
ADLS_ACUITY_SCORE: 16
ADLS_ACUITY_SCORE: 14

## 2020-10-01 ASSESSMENT — MIFFLIN-ST. JEOR: SCORE: 1185.31

## 2020-10-01 NOTE — PLAN OF CARE
"Patient is a 84 year old female admitted on 9/23 with aortic stenosis s/p mechanical AVR re-do and lysis of adhesions on 9/23. PMHx relevant for AVR & PFO closure (2004), HTN, GERD, glaucoma, and HLD.     /58 (BP Location: Right arm)   Pulse 94   Temp 97.7  F (36.5  C) (Oral)   Resp 18   Ht 1.511 m (4' 11.49\")   Wt 81.2 kg (179 lb)   SpO2 94%   BMI 35.56 kg/m      A&Ox4, calls appropriately. Given atarax and melatonin at HS for sleep, but still w/ difficulty falling & remaining asleep throughout the night. Atrial flutter on tele (new as of 9/29) with rates in the 90s-100s. Temporary pacer wires capped. VS otherwise stable on RA. Denies chest pain, palpitations, shortness of breath, lightheadedness, or other concern. CT removed 9/29, wound vac removed from midline incision on 9/30 with \"tape burn\" from removal per day RN, gauze dressing in place. L arm edema, US negative for DVT. Also with minor bilateral LE edema. Purewick external catheter in place at HS for urinary incontinence. +BM x2 on days yesterday 9/30. Declined scheduled bowel meds as pt states her stools have been loose. Regular diet, nutrition consulting. R PIV x1, saline locked. Denies pain, receiving scheduled tylenol.    EP consulting for a-flutter, to determine if they want to cardiovert pt prior to discharge to ARU likely today or tomorrow.    Hilda Gould RN  6:27 AM    "

## 2020-10-01 NOTE — PROGRESS NOTES
Patient arrived in echo lab for WESLEY & DCCV. Upon assessment, it was noted that patient is in NSR. EKG obtained and LESLEY Russell NP, verbally cancelled procedure. Patient transported back to  via stretcher. Update given to MOLLY Brantley.

## 2020-10-01 NOTE — CONSULTS
Electrophysiology Consultation Note   EP Attending: .   Reason for consultation: post operative AF.   Provider requesting consultation: Mr. Eric PA-C CVTS Service.  Date of Service: 10/1/2020      HPI:   Ms. Bee is an 85 year old female who has a past medical history significant for BAV with severe aortic stenosis s/p mechanical AVR and PFO repair 2004, HTN, HLD, and GERD who developed severe aortic stenosis of bioprosthetic aortic valve now s/p redo mechanical AVR 9/23/20. She has been recovering well post operatively. Pre operative coronary angiogram showed normal coronaries. Post operative echo showed LVEF 60-65% with normal functioning mechanical AV. She went into AFL on 9/29/20 90-100s. She is having some palpitations but is hemodynamically stable. HRs do get up to 130s with activities. She denies any known history of arrhyhmias. She has been on Warfarin for mechanical valve, INR 2.51 today. SCr and electrolytes stable. Current cardiac medications include: ASA, Lasix, Metoprolol, Zocor, and Warfarin.     Past Medical History:   Past Medical History:   Diagnosis Date     Aortic aneurysm (H) 9/19/2012    Mild dilation of the ascending aorta measuring 3.8cm. Mild dilation of the ascending aorta measuring 3.8cm.     Aortic stenosis 8/19/2020    Added automatically from request for surgery [id number]     Aortic valve disorder 7/21/2020    A. S/p aortic valve replacement 2004 with 21mm St. Romeo B. Last echocardiogram 2006 demonstrating armida functioning valve -- mean gradient of 16mmHg. Normal LV size and function, no other significant valve disease. A. S/p aortic valve replacement 2004 with 21mm St. Romeo B. Last echocardiogram 2006 demonstrating armida functioning valve -- mean gradient of 16mmHg. Normal LV size and function, no oth     Dyslipidemia 12/4/2013     Early dry stage nonexudative age-related macular degeneration of both eyes 1/13/2020     Hypertension, benign essential, goal below 140/90  12/4/2013     Long term current use of anticoagulant therapy 7/16/2009     Osteoarthrosis involving lower leg 7/21/2020     Ostium secundum type atrial septal defect 9/19/2012    Closed during aortic valve replacement surgically in 2004 Closed during aortic valve replacement surgically in 2004     PFO (patent foramen ovale) 9/19/2012    Closed during aortic valve replacement surgically in 2004 Closed during aortic valve replacement surgically in 2004     Vitamin D deficiency 1/18/2013     Past Surgical History:   Past Surgical History:   Procedure Laterality Date     AS TOTAL KNEE ARTHROPLASTY Right 2015     CARPAL TUNNEL RELEASE RT/LT       CATARACT IOL, RT/LT       FEMUR SURGERY Left     fracture repair     REDO STERNOTOMY REPLACE VALVE AORTIC N/A 9/23/2020    Procedure: Redo sternotomy, lysis of adhesions, replacement of mechanical aortic valve with 19 mm St. Romeo Mechanical Heart Vave, on pump oxygenation.;  Surgeon: Jey Rainey MD;  Location: [facility] OR     REPAIR VALVE AORTIC  2004     Allergies: Per MAR     Allergies   Allergen Reactions     Blood Transfusion Related (Informational Only) Other (See Comments)     Patient has a history of a clinically significant antibody against RBC antigens.  A delay in compatible RBCs may occur.     Medications:   Per MAR current outpatient cardiovascular medications include:   Medications Prior to Admission   Medication Sig Dispense Refill Last Dose     carboxymethylcellulose PF (REFRESH PLUS) 0.5 % ophthalmic solution Place 1 drop into both eyes 3 times daily as needed for dry eyes   9/23/2020 at Unknown time     dorzolamide-timolol (COSOPT) 2-0.5 % ophthalmic solution Place 1 drop into both eyes 2 times daily    9/23/2020 at Unknown time     enoxaparin ANTICOAGULANT (LOVENOX) 80 MG/0.8ML syringe    9/22/2020 at 0800     latanoprost (XALATAN) 0.005 % ophthalmic solution Place 1 drop into both eyes At Bedtime    9/23/2020 at Unknown time     metoprolol tartrate (LOPRESSOR) 50  MG tablet TAKE 1 TABLET BY MOUTH TWICE A DAY   9/23/2020 at 0445     calcium carbonate 600 mg-vitamin D 400 units (CALTRATE) 600-400 MG-UNIT per tablet Take 2 tablets by mouth daily   9/16/2020     celecoxib (CELEBREX) 100 MG capsule Take 100 mg by mouth daily (with breakfast)    9/19/2020     Methylcellulose, Laxative, (CITRUCEL PO) Take 2 capsules by mouth daily        Multiple Vitamins-Minerals (OCUVITE ADULT FORMULA PO) Take 1 tablet by mouth daily    9/16/2020     multivitamin (CENTRUM SILVER) tablet Take 1 tablet by mouth daily   9/16/2020     simvastatin (ZOCOR) 20 MG tablet Take 20 mg by mouth At Bedtime    9/16/2020     spironolactone (ALDACTONE) 25 MG tablet TAKE 1 TABLET(25 MG) BY MOUTH EVERY DAY IN THE MORNING   9/16/2020     warfarin ANTICOAGULANT (COUMADIN) 4 MG tablet Take 2 mg (1/2 tablet) on Fridays and 4 mg by mouth on all other days of the week.  Takes at 4:30 PM.   9/17/2020     No current outpatient medications on file.     Current Facility-Administered Medications   Medication Dose Route Frequency     acetaminophen  975 mg Oral TID     aspirin  81 mg Oral Daily     dorzolamide-timolol  1 drop Both Eyes BID     furosemide  40 mg Oral BID     latanoprost  1 drop Both Eyes At Bedtime     metoprolol tartrate  25 mg Oral BID     pantoprazole  40 mg Oral QAM AC     potassium chloride  20 mEq Oral BID     sennosides  8.6 mg Oral BID     simvastatin  20 mg Oral At Bedtime     sodium chloride (PF)  10 mL Intravenous Once     sodium chloride (PF)  3 mL Intracatheter Q8H     Family History:   Family History   Problem Relation Age of Onset     Cardiovascular Father      Colon Cancer Father      Anesthesia Reaction No family hx of      Deep Vein Thrombosis (DVT) No family hx of      Social History:   Social History     Tobacco Use     Smoking status: Never Smoker     Smokeless tobacco: Never Used   Substance Use Topics     Alcohol use: Yes     Alcohol/week: 6.0 standard drinks     Types: 6 Glasses of wine  "per week     Frequency: 2-4 times a month     Drinks per session: 1 or 2     Binge frequency: Weekly       ROS:   A comprehensive 10 point ROS was negative other than as mentioned in HPI.    Physical Examination:   VITALS: /58 (BP Location: Right arm)   Pulse 94   Temp 97.7  F (36.5  C) (Oral)   Resp 18   Ht 1.511 m (4' 11.49\")   Wt 81.2 kg (179 lb)   SpO2 94%   BMI 35.56 kg/m    GENERAL APPEARANCE: AxO, NAD   HEENT: NCAT, EOMI, MMM.   NECK: Supple. No JVD or bruit. Good carotid upstroke.   CHEST: CTAB   CARDIOVASCULAR:  Valve click, irregular.    ABDOMEN: BS+, soft, No pulsatile masses or bruits.   EXTREMITIES: No pedal edema. Distal pulses intact.   NEURO: Grossly nonfocal.   PSYCH: Normal affect.  SKIN: Warm and dry.   Data:   Labs:  BMP  Recent Labs   Lab 10/01/20  0555 09/30/20  0540 09/29/20  0530 09/28/20  0515    141 142 143   POTASSIUM 4.7 4.3 4.0 3.5   CHLORIDE 110* 110* 111* 111*   RYANN 8.4* 8.8 8.4* 8.3*   CO2 27 26 26 25   BUN 24 23 25 26   CR 1.14* 0.99 0.93 0.97   GLC 97 100* 101* 91     CBC  Recent Labs   Lab 10/01/20  0555 09/30/20  0540 09/29/20  0530 09/28/20  0515   WBC 6.2 9.2 7.6 7.1   RBC 2.64* 2.86* 2.81* 2.62*   HGB 8.6* 9.2* 9.2* 8.3*   HCT 28.2* 29.6* 29.2* 27.8*   * 104* 104* 106*   MCH 32.6 32.2 32.7 31.7   MCHC 30.5* 31.1* 31.5 29.9*   RDW 13.4 13.3 13.4 13.7    355 291 230     INR  Recent Labs   Lab 10/01/20  0555 09/30/20  0540 20  0515   INR 2.77* 2.30* 2.02* 1.91*     EK/23/20 ECHO:   Interpretation Summary  Technically difficult study.Extremely poor acoustic windows.  Limited information was obtained during study.  S/P AVR with 19mm St. Romeo [place] mechanical valveo on 2020. Mean  gradient 9mmHg. No AI.  Left ventricular function, chamber size, wall motion, and wall thickness are  normal.The EF is 60-65%.  No regional wall motion abnormalities are seen.  Right ventricular function is normal.  No pericardial " effusion is present.  There is no prior study for direct comparison.    Assessment:   Ms. Bee is an 85 year old female who has a past medical history significant for BAV with severe aortic stenosis s/p mechanical AVR and PFO repair , HTN, HLD, and GERD who developed severe aortic stenosis of bioprosthetic aortic valve now s/p redo mechanical AVR 20. She has been recovering well post operatively. Pre operative coronary angiogram showed normal coronaries. Post operative echo showed LVEF 60-65% with normal functioning mechanical AV. She went into AFL on 20 90-100s. She is having some palpitations but is hemodynamically stable. HRs do get up to 130s with activities. She denies any known history of arrhyhmias. She has been on Warfarin for mechanical valve, INR 2.51 today. SCr and electrolytes stable. Current cardiac medications include: ASA, Lasix, Metoprolol, Zocor, and Warfarin.   EP Recommendations:  Post operative Atrial fibrillation/flutter:   We discussed in detail with the patient management/treatment options for Brandy includin. Stroke Prophylaxis:  CHADSVASC=++age, +gender, +HTN  4, corresponding to a 4.0% annual stroke / systemic emolism event rate. indicating need for long term oral anticoagulation.  She also requires Warfarin for her mechanical valve.   2. Rate Control: Continue Metoprolol and up titrate as able.   3. Rhythm Control: Cardioversion, Antiarrhythmics and/or ablation are options for rhythm control. Given this is her first ever episode and is occurring in post operative setting, do not feel strongly about addition of AAT. Would simply pursue WESLEY/DCCV. If she has recurrences, then would do short course of amiodarone.    4. Risk Factor Management: Continue statin, BP control, and SANJUANITA evaluation as indicated.     The patient states understanding and is agreeable with plan.   Thank you for allowing us to participate in the care of this patient.       FLORIN Russell  CNP  Electrophysiology Consult Service  Pager: [phone]        UPDATE:     Patient arrived for WESLEY/DCCV this afternoon and had converted to sinus. WESLEY/DCCV cancelled.

## 2020-10-01 NOTE — PLAN OF CARE
D: Pt admitted 09/23 with aortic stenosis s/p mechanical AVR re-dp and lysis of adhesions on 09/23. History of AVR and PFO closure (2004), HTN, GERD, glaucoma, and HLD.    I/A: VSS on room air. Pt self converted to SR/ST this AM. TPW cut per CVTS. Pain controlled with scheduled Tylenol. COVID negative (requested for ARU). Up 1-assist and walker in halls. +BM, adequate UOP.    P: Anticipate discharge tomorrow afternoon, pew GUDELIA pt's sister planning to give pt ride to ARU.

## 2020-10-01 NOTE — PROGRESS NOTES
Cardiovascular Surgery Progress Note  10/01/2020           Assessment and Plan:   Monika Bee is a 84 year old female with a PMH of bicuspid aortic valve with severe aortic stenosis s/p replacement with bioprosthesis and patent foramen ovale s/p repair (2004), HTN, and GERD developed aortic valve restenosis,  s/p redo sternotomy with aortic valve replacement with 19 mm St Romeo [place] mechanical prosthesis on 09/23/20 with Dr. Rainey. Post op echo with mild RV dysfunction . Extubated on 9/24.      Cardiovascular:   Severe aortic stenosis of bioprosthetic aortic valve - s/p redo sternotomy AVR (mechanical)  HTN  HLD  Pre-op WESLEY echo on 7/29 showed LVEF 65% with aortic stenosis. Post-operative echo on 9/29 with LVEF 60-65% with mechanical AV gradient without AI.  - ASA 81, simvastatin 20 mg at bedtime, metoprolol 25 mg BID  - Coumadin for mechanical aortic valve, INR therapeutic, no bridging per Dr. Rainey  - Mediastinal tubes removed 9/28, bilateral pleural tubes split 9/28, CXR stable, removed bilateral pleural tubes on 9/29, follow-up CXR stable. Repeat baseline CXR 9/30 stable  - TPW cut  Atrial flutter variable AV block  Went into aflutter 9/29, rates in 90s to 100s. Patient not having any symptoms. EKG obtained confirming aflutter, but also showed STEMI. Patient without any chest pain or SOB. Pre-op coronary arteries without disease. Curbsided cardiology, recommended echo. Echo on 9/29 without wall motion abnormalities, normal LVEF.   - Consult EP for consideration of cardioversion, plan for possible cardioversion today (10/1)  - Patient converted prior to cardioversion, TPW cut on 10/1    Pulmonary:  Extubated POD 1; Saturating well on RA.  Encourage IS, C&DB, ambulating    Neuro/MSK:  Neuro intact  Acute post-operative pain   - Pain well controlled with IV dilaudid, PO oxycodone    /Renal:  Baseline Creatinine WNL, Pre-op weight 176 lbs, Creatinine today slightly elevated 1.14, likely  overdiuresis  Diuresis - approaching euvolemia, was on lasix 20 mg IV BID, gave extra dose of 20 mg IV x1 on 9/28 and 9/29 (60 mg IV total daily), transitioned to lasix 40 mg PO BID with scheduled K on 9/30  - Decrease to lasix 20 mg po daily given creatinine increase    GI/FEN:   Regular diet, continue bowel regimen, + BM  Replace lytes as needed    Endo:  Stress induced hyperglycemia   Managed on insulin drip postop, transitioned to sliding scale goal BG <180  - Discontinued glucose checks    Heme:   Acute blood loss anemia and thrombocytopenia  - Hgb stable; Plt WNL, no signs or symptoms of bleeding    ID:  Stress induced leukocytosis   - WBC WNL, afebrile, no signs or symptoms of infection  - Completed perioperative antibiotics    Prophylaxis:   Stress ulcer prophylaxis: Pantoprazole 40 mg daily  DVT prophylaxis: Subcutaneous heparin, PCD    Dispo:   Transferred to  on 9/27  Rehab recommending discharge to ARU, pending EP evaluation and diuresis, likely tomorrow    Staff surgeons have been informed of changes through both verbal and written communication.       Jorge Reyes PA-C  Cardiothoracic Surgery  p: [phone]          Interval History:   No acute events overnight. Continues in aflutter, rates controlled, but do get up to 130s with activity. Continues to feel fatigued today. Patient denies any chest pain/pressure, SOB, orthopnea, palpitations, lightheadedness, dizziness. LUE edema is improving. States pain is well managed on current regimen. Tolerating diet, appetite is okay, is passing flatus, + BM. Denies syncopal symptoms, chills, myalgias or sternal popping/clicking.          Medications:       acetaminophen  975 mg Oral TID     aspirin  81 mg Oral Daily     dorzolamide-timolol  1 drop Both Eyes BID     furosemide  20 mg Oral Daily     latanoprost  1 drop Both Eyes At Bedtime     metoprolol tartrate  25 mg Oral BID     pantoprazole  40 mg Oral QAM AC     potassium chloride  10 mEq Oral Daily      "sennosides  8.6 mg Oral BID     simvastatin  20 mg Oral At Bedtime     sodium chloride (PF)  10 mL Intravenous Once     sodium chloride (PF)  3 mL Intracatheter Q8H     - MEDICATION INSTRUCTIONS -, carboxymethylcellulose PF, dextrose, glucose **OR** dextrose **OR** glucagon, hydrALAZINE, HYDROmorphone, hydrOXYzine, lidocaine 4%, lidocaine (buffered or not buffered), magnesium sulfate, magnesium sulfate, melatonin, naloxone, - MEDICATION INSTRUCTIONS -, oxyCODONE, potassium chloride, potassium chloride with lidocaine, potassium chloride, potassium chloride, potassium chloride, potassium phosphate (KPHOS) in D5W IV, potassium phosphate (KPHOS) in D5W IV, potassium phosphate (KPHOS) in D5W IV, potassium phosphate (KPHOS) in D5W IV, potassium phosphate (KPHOS) in D5W IV, sodium chloride (PF), Warfarin Therapy Reminder          Physical Exam:   Vitals were reviewed  Blood pressure 117/61, pulse 96, temperature 97.7  F (36.5  C), temperature source Oral, resp. rate 20, height 1.511 m (4' 11.49\"), weight 81.2 kg (179 lb), SpO2 97 %.  Vitals:    09/29/20 0900 09/30/20 0628 10/01/20 0604   Weight: 83.6 kg (184 lb 6.4 oz) 82.1 kg (180 lb 14.4 oz) 81.2 kg (179 lb)      I/O last 3 completed shifts:  In: 840 [P.O.:840]  Out: 600 [Urine:600]    Gen: A&Ox4, NAD  CV: RRR, normal S1, S2, no murmurs, rubs or gallops. Valve click present  Pulm: Lungs diminished in bases, otherwise CTA, no wheezing or rhonchi  Abd: Soft, NT, ND, +BS  Ext: 1+ LE edema, 1+ LUE edema, improving  Neuro: Nonfocal  Incision: c/d/i, no erythema, sternum stable  Tubes/drains: Dressing clean and dry.          Data:    All labs and imaging reviewed by me.  Labs:    Data   Recent Labs   Lab 10/01/20  0555 09/30/20  0540 09/29/20  0530   WBC 6.2 9.2 7.6   HGB 8.6* 9.2* 9.2*   * 104* 104*    355 291   INR 2.77* 2.30* 2.02*    141 142   POTASSIUM 4.7 4.3 4.0   CHLORIDE 110* 110* 111*   CO2 27 26 26   BUN 24 23 25   CR 1.14* 0.99 0.93   ANIONGAP 5 " 4 5   RYANN 8.4* 8.8 8.4*   GLC 97 100* 101*     Recent Labs   Lab 10/01/20  0555 09/30/20  0540 09/29/20  0530 09/28/20  1439 09/28/20  0515 09/27/20  2115 09/27/20  1702 09/27/20  1232 09/27/20  0733 09/27/20  0424 09/26/20  1933 09/26/20  0324 09/26/20  0324   GLC 97 100* 101*  --  91  --   --   --  93  --   --   --  100*   BGM  --   --   --  112*  --  103* 87 121*  --  83 126*   < >  --     < > = values in this interval not displayed.        Imaging:  Recent Results (from the past 24 hour(s))   XR Chest 2 Views    Narrative    Chest 2 views    INDICATION: Interval    COMPARISON: 9/29/2020    FINDINGS: Heart size and shape appear normal. Aortic arch  atherosclerosis. Median sternotomy again evident. Aortic valve  replacement again noted. Unchanged mild left and right costophrenic  angle blunting column likely scarring versus small chronic effusions.  Linear densities in the left mid lung laterally appears similar,  likely related to atelectasis.      Impression    IMPRESSION: Mild bibasilar scarring rather than small chronic pleural  effusions. Left lung subsegmental atelectasis. Aortic valve  replacement. Aortic atherosclerosis.    JOSE ROACH MD

## 2020-10-01 NOTE — PROGRESS NOTES
Social Work Services Progress Note    Hospital Day: 9  Date of Initial Social Work Evaluation:  9/25/2020  Collaborated with:  CVTS, [facility] admissions, chart review, [facility].      Data:  PRATIMA [place] [facility] [place] was denied due to pt progressing well with therapies, no longer meeting criteria for ARU. PRATIMA collaborated with CVTS PA who is in agreement that SNF authorization should be pursued. PRATIMA contacted [facility] and submitted alternate request for SNF auth (Case ref [id number].). PRATIMA faxed clinicals to [facility].     PRATIMA updated [facility] admissions; TCU states they have TCU bed for pt tomorrow. They request Covid test which CVTS PA ordered.      PRATIMA updated pt's sister Caty on the phone. [facility]). PRATIMA provided her with details of discharge, including [facility], current visitor policy (no visitors for first 2 weeks), and contact information for nursing unit. Sister Caty would like to transport pt tomorrow afternoon, if possible.     Intervention:  Discharge planning and coordination of care.    Assessment:  Pt remains hospitalized at this time    Plan:    Anticipated Disposition:  Facility:  [facility]    Barriers to d/c plan:  Auth    Follow Up:  PAS, transport, confirm SNF auth   ---------------------------------------------------------------------------------------------------------  PRATIMA received auth from [facility] for SNF placement. PRATIMA faxed auth (valid from 10/2-10/11, to [facility]. [facility] requested sister  pt at 1200. Pratima provided sister with information re: drop off to TCU (including they have to go straight to the TCU- no stops- and to call [phone] when they arrive).     PRATIMA completed PAS, ref#[id number]. PRATIMA completed IMM with pt at bedside.     PRATIMA updated pt at bedside, offered her support and encouragement. Pt is agreeable to discharge to [facility].     SW to follow up with [facility] Friday AM to confirm what  time family should pick pt up for transport to [facility].    SW updated bedside RN, charge RN, and attending PA.       Juany Gates MSW, [facility]  6C Cardiology Unit   [facility]  Phone: [phone]  Pager: [phone]

## 2020-10-01 NOTE — PLAN OF CARE
PT 6C: Cancel- Patient off unit this morning, working with OT this afternoon. Unable to check back due to scheduling demands. WIll reschedule.

## 2020-10-02 ENCOUNTER — HOSPITAL ENCOUNTER (INPATIENT)
Facility: SKILLED NURSING FACILITY | Age: 85
LOS: 7 days | Discharge: HOME | DRG: 949 | End: 2020-10-09
Attending: HOSPITALIST | Admitting: HOSPITALIST
Payer: COMMERCIAL

## 2020-10-02 VITALS
RESPIRATION RATE: 20 BRPM | OXYGEN SATURATION: 94 % | DIASTOLIC BLOOD PRESSURE: 69 MMHG | HEART RATE: 95 BPM | BODY MASS INDEX: 36.08 KG/M2 | SYSTOLIC BLOOD PRESSURE: 122 MMHG | WEIGHT: 179 LBS | TEMPERATURE: 98 F | HEIGHT: 59 IN

## 2020-10-02 DIAGNOSIS — R10.13 DYSPEPSIA: ICD-10-CM

## 2020-10-02 DIAGNOSIS — I35.0 AORTIC VALVE STENOSIS, ETIOLOGY OF CARDIAC VALVE DISEASE UNSPECIFIED: Primary | ICD-10-CM

## 2020-10-02 DIAGNOSIS — N30.00 ACUTE CYSTITIS WITHOUT HEMATURIA: ICD-10-CM

## 2020-10-02 DIAGNOSIS — Z95.2 HISTORY OF AORTIC VALVE REPLACEMENT: ICD-10-CM

## 2020-10-02 PROBLEM — R53.1 WEAKNESS: Status: ACTIVE | Noted: 2020-10-02

## 2020-10-02 LAB
ANION GAP SERPL CALCULATED.3IONS-SCNC: 4 MMOL/L (ref 3–14)
BUN SERPL-MCNC: 28 MG/DL (ref 7–30)
CALCIUM SERPL-MCNC: 8.5 MG/DL (ref 8.5–10.1)
CHLORIDE SERPL-SCNC: 107 MMOL/L (ref 94–109)
CO2 SERPL-SCNC: 28 MMOL/L (ref 20–32)
CREAT SERPL-MCNC: 1.15 MG/DL (ref 0.52–1.04)
ERYTHROCYTE [DISTWIDTH] IN BLOOD BY AUTOMATED COUNT: 13.4 % (ref 10–15)
GFR SERPL CREATININE-BSD FRML MDRD: 44 ML/MIN/{1.73_M2}
GLUCOSE SERPL-MCNC: 99 MG/DL (ref 70–99)
HCT VFR BLD AUTO: 28.5 % (ref 35–47)
HGB BLD-MCNC: 8.7 G/DL (ref 11.7–15.7)
INR PPP: 2.64 (ref 0.86–1.14)
INTERPRETATION ECG - MUSE: NORMAL
INTERPRETATION ECG - MUSE: NORMAL
MCH RBC QN AUTO: 32.1 PG (ref 26.5–33)
MCHC RBC AUTO-ENTMCNC: 30.5 G/DL (ref 31.5–36.5)
MCV RBC AUTO: 105 FL (ref 78–100)
PLATELET # BLD AUTO: 490 10E9/L (ref 150–450)
POTASSIUM SERPL-SCNC: 4.2 MMOL/L (ref 3.4–5.3)
RBC # BLD AUTO: 2.71 10E12/L (ref 3.8–5.2)
SODIUM SERPL-SCNC: 140 MMOL/L (ref 133–144)
WBC # BLD AUTO: 7.5 10E9/L (ref 4–11)

## 2020-10-02 PROCEDURE — 85027 COMPLETE CBC AUTOMATED: CPT | Performed by: PHYSICIAN ASSISTANT

## 2020-10-02 PROCEDURE — 36415 COLL VENOUS BLD VENIPUNCTURE: CPT | Performed by: PHYSICIAN ASSISTANT

## 2020-10-02 PROCEDURE — 120N000009 HC R&B SNF

## 2020-10-02 PROCEDURE — 250N000013 HC RX MED GY IP 250 OP 250 PS 637: Performed by: STUDENT IN AN ORGANIZED HEALTH CARE EDUCATION/TRAINING PROGRAM

## 2020-10-02 PROCEDURE — 99310 SBSQ NF CARE HIGH MDM 45: CPT | Performed by: HOSPITALIST

## 2020-10-02 PROCEDURE — 80048 BASIC METABOLIC PNL TOTAL CA: CPT | Performed by: PHYSICIAN ASSISTANT

## 2020-10-02 PROCEDURE — 99207 PR CDG-HISTORY COMP: MEETS EXP. PROBLEM FOCUSED - DOWN CODED LACK OF HPI: CPT | Performed by: HOSPITALIST

## 2020-10-02 PROCEDURE — 85610 PROTHROMBIN TIME: CPT | Performed by: PHYSICIAN ASSISTANT

## 2020-10-02 PROCEDURE — 250N000013 HC RX MED GY IP 250 OP 250 PS 637: Performed by: HOSPITALIST

## 2020-10-02 PROCEDURE — 250N000013 HC RX MED GY IP 250 OP 250 PS 637: Performed by: PHYSICIAN ASSISTANT

## 2020-10-02 RX ORDER — ACETAMINOPHEN 325 MG/1
650 TABLET ORAL EVERY 6 HOURS PRN
Status: DISCONTINUED | OUTPATIENT
Start: 2020-10-02 | End: 2020-10-09 | Stop reason: HOSPADM

## 2020-10-02 RX ORDER — PANTOPRAZOLE SODIUM 40 MG/1
40 TABLET, DELAYED RELEASE ORAL
Qty: 30 TABLET | Status: ON HOLD | DISCHARGE
Start: 2020-10-03 | End: 2020-10-08

## 2020-10-02 RX ORDER — WARFARIN SODIUM 2 MG/1
2 TABLET ORAL
Status: COMPLETED | OUTPATIENT
Start: 2020-10-02 | End: 2020-10-02

## 2020-10-02 RX ORDER — METOPROLOL TARTRATE 25 MG/1
50 TABLET, FILM COATED ORAL 2 TIMES DAILY
Status: DISCONTINUED | OUTPATIENT
Start: 2020-10-02 | End: 2020-10-09 | Stop reason: HOSPADM

## 2020-10-02 RX ORDER — FUROSEMIDE 20 MG
20 TABLET ORAL DAILY
Status: ON HOLD | DISCHARGE
Start: 2020-10-03 | End: 2020-10-08

## 2020-10-02 RX ORDER — LATANOPROST 50 UG/ML
1 SOLUTION/ DROPS OPHTHALMIC AT BEDTIME
Status: DISCONTINUED | OUTPATIENT
Start: 2020-10-02 | End: 2020-10-09 | Stop reason: HOSPADM

## 2020-10-02 RX ORDER — DORZOLAMIDE HYDROCHLORIDE AND TIMOLOL MALEATE 20; 5 MG/ML; MG/ML
1 SOLUTION/ DROPS OPHTHALMIC 2 TIMES DAILY
Status: DISCONTINUED | OUTPATIENT
Start: 2020-10-02 | End: 2020-10-09 | Stop reason: HOSPADM

## 2020-10-02 RX ORDER — CARBOXYMETHYLCELLULOSE SODIUM 5 MG/ML
1 SOLUTION/ DROPS OPHTHALMIC 3 TIMES DAILY PRN
Status: DISCONTINUED | OUTPATIENT
Start: 2020-10-02 | End: 2020-10-09 | Stop reason: HOSPADM

## 2020-10-02 RX ORDER — MULTIPLE VITAMINS W/ MINERALS TAB 9MG-400MCG
1 TAB ORAL DAILY
Status: DISCONTINUED | OUTPATIENT
Start: 2020-10-02 | End: 2020-10-02

## 2020-10-02 RX ORDER — ACETAMINOPHEN 325 MG/1
650 TABLET ORAL EVERY 6 HOURS PRN
Status: DISCONTINUED | OUTPATIENT
Start: 2020-10-02 | End: 2020-10-02 | Stop reason: HOSPADM

## 2020-10-02 RX ORDER — ASPIRIN 81 MG/1
81 TABLET, CHEWABLE ORAL DAILY
DISCHARGE
Start: 2020-10-03

## 2020-10-02 RX ORDER — ACETAMINOPHEN 325 MG/1
650 TABLET ORAL EVERY 6 HOURS PRN
DISCHARGE
Start: 2020-10-02

## 2020-10-02 RX ORDER — POTASSIUM CHLORIDE 750 MG/1
10 TABLET, EXTENDED RELEASE ORAL DAILY
Status: DISCONTINUED | OUTPATIENT
Start: 2020-10-03 | End: 2020-10-08

## 2020-10-02 RX ORDER — METOPROLOL TARTRATE 25 MG/1
50 TABLET, FILM COATED ORAL 2 TIMES DAILY
DISCHARGE
Start: 2020-10-02

## 2020-10-02 RX ORDER — WARFARIN SODIUM 1 MG/1
TABLET ORAL
Status: ON HOLD | DISCHARGE
Start: 2020-10-02 | End: 2020-10-08

## 2020-10-02 RX ORDER — POTASSIUM CHLORIDE 750 MG/1
10 TABLET, EXTENDED RELEASE ORAL DAILY
Status: ON HOLD | DISCHARGE
Start: 2020-10-03 | End: 2020-10-08

## 2020-10-02 RX ORDER — PANTOPRAZOLE SODIUM 40 MG/1
40 TABLET, DELAYED RELEASE ORAL
Status: DISCONTINUED | OUTPATIENT
Start: 2020-10-03 | End: 2020-10-09 | Stop reason: HOSPADM

## 2020-10-02 RX ORDER — FUROSEMIDE 20 MG
20 TABLET ORAL DAILY
Status: DISCONTINUED | OUTPATIENT
Start: 2020-10-03 | End: 2020-10-09 | Stop reason: HOSPADM

## 2020-10-02 RX ORDER — SIMVASTATIN 20 MG
20 TABLET ORAL AT BEDTIME
Status: DISCONTINUED | OUTPATIENT
Start: 2020-10-02 | End: 2020-10-09 | Stop reason: HOSPADM

## 2020-10-02 RX ORDER — MULTIPLE VITAMINS W/ MINERALS TAB 9MG-400MCG
1 TAB ORAL DAILY
Status: DISCONTINUED | OUTPATIENT
Start: 2020-10-02 | End: 2020-10-09 | Stop reason: HOSPADM

## 2020-10-02 RX ORDER — METOPROLOL TARTRATE 25 MG/1
25 TABLET, FILM COATED ORAL 2 TIMES DAILY
DISCHARGE
Start: 2020-10-02 | End: 2020-10-02

## 2020-10-02 RX ORDER — ASPIRIN 81 MG/1
81 TABLET, CHEWABLE ORAL DAILY
Status: DISCONTINUED | OUTPATIENT
Start: 2020-10-03 | End: 2020-10-09 | Stop reason: HOSPADM

## 2020-10-02 RX ADMIN — WARFARIN SODIUM 2 MG: 2 TABLET ORAL at 17:52

## 2020-10-02 RX ADMIN — DORZOLAMIDE HYDROCHLORIDE AND TIMOLOL MALEATE 1 DROP: 20; 5 SOLUTION/ DROPS OPHTHALMIC at 08:06

## 2020-10-02 RX ADMIN — SIMVASTATIN 20 MG: 20 TABLET, FILM COATED ORAL at 21:38

## 2020-10-02 RX ADMIN — METOPROLOL TARTRATE 25 MG: 25 TABLET, FILM COATED ORAL at 08:05

## 2020-10-02 RX ADMIN — FUROSEMIDE 20 MG: 20 TABLET ORAL at 08:06

## 2020-10-02 RX ADMIN — LATANOPROST 1 DROP: 50 SOLUTION OPHTHALMIC at 21:43

## 2020-10-02 RX ADMIN — DORZOLAMIDE HYDROCHLORIDE AND TIMOLOL MALEATE 1 DROP: 20; 5 SOLUTION/ DROPS OPHTHALMIC at 21:39

## 2020-10-02 RX ADMIN — MULTIPLE VITAMINS W/ MINERALS TAB 1 TABLET: TAB at 17:52

## 2020-10-02 RX ADMIN — PANTOPRAZOLE SODIUM 40 MG: 40 TABLET, DELAYED RELEASE ORAL at 08:06

## 2020-10-02 RX ADMIN — POTASSIUM CHLORIDE 10 MEQ: 750 TABLET, EXTENDED RELEASE ORAL at 08:06

## 2020-10-02 RX ADMIN — METHYLCELLULOSE 500 MG: 500 TABLET ORAL at 19:18

## 2020-10-02 RX ADMIN — CALCIUM CARBONATE 600 MG (1,500 MG)-VITAMIN D3 400 UNIT TABLET 2 TABLET: at 17:52

## 2020-10-02 RX ADMIN — ACETAMINOPHEN 975 MG: 325 TABLET, FILM COATED ORAL at 08:06

## 2020-10-02 RX ADMIN — ASPIRIN 81 MG CHEWABLE TABLET 81 MG: 81 TABLET CHEWABLE at 08:06

## 2020-10-02 RX ADMIN — METOPROLOL TARTRATE 50 MG: 25 TABLET, FILM COATED ORAL at 21:37

## 2020-10-02 ASSESSMENT — ACTIVITIES OF DAILY LIVING (ADL)
TOILETING_ISSUES: NO
ADLS_ACUITY_SCORE: 16
ADLS_ACUITY_SCORE: 16
NUMBER_OF_TIMES_PATIENT_HAS_FALLEN_WITHIN_LAST_SIX_MONTHS: 5
EQUIPMENT_CURRENTLY_USED_AT_HOME: CANE, STRAIGHT;WALKER, ROLLING
FALL_HISTORY_WITHIN_LAST_SIX_MONTHS: YES
WHICH_OF_THE_ABOVE_FUNCTIONAL_RISKS_HAD_A_RECENT_ONSET_OR_CHANGE?: AMBULATION;TRANSFERRING;TOILETING;BATHING;DRESSING
DRESSING/BATHING: BATHING DIFFICULTY, ASSISTANCE 1 PERSON
ADLS_ACUITY_SCORE: 16
WALKING_OR_CLIMBING_STAIRS: AMBULATION DIFFICULTY, REQUIRES EQUIPMENT;STAIR CLIMBING DIFFICULTY, ASSISTANCE 1 PERSON;TRANSFERRING DIFFICULTY, ASSISTANCE 1 PERSON
ADLS_ACUITY_SCORE: 16

## 2020-10-02 ASSESSMENT — MIFFLIN-ST. JEOR: SCORE: 1191.2

## 2020-10-02 NOTE — H&P
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 She had Hx bicuspid AV and had bioprosthetic aortic valve which developed stenosis. She had a syncopal episode from that. She underwent Mechanical Aortic Valve Placement on 20 by Dr Dr. Jey Rainey. Now transferred for Rehab. Post she had Aflutter which resolved on its own.     # S/P redo sternotomy and new mechanical AVR on 20 by Dr Jey Rainey  # Prior Hx of bicuspid AV and aortic stenosis of prior bioprosthetic AV valve  # Chronic anticoagulation for [facility] AVR. INR goal 2-3  # Prior Hx of PFO closure  # Deconditioning from above procedure  -she is doing well.   -sternal precautions  -PT, OT  -Ct ASA 81 mg every day, lasix 20 mg every day, metoprolol 50 mg BID, Kdur 10 mg every day, Simvastatin 20 mg every day, warfarin (goal 2-3)  -Follow-up with thoracic Sx as planned  # Post op. Paroxysmal Atrial Flutter, variable AV block..   -Coverted to Sinus on its own. Cardioversion aborted.   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 Creat peak at 1.15. Lasix cut back to 20 mg every day, ct that. Monitor.   # HT: BP is controlled. Current BP is 149/52. Dropped on 130/50 on standing. Monitor for worsening erythrostasis.   # Dyslipidemia: ct Zocor  # Vid D Deficiency: Ct on Ca and Vit D supplements.     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 She had Hx bicuspid AV and had bioprosthetic aortic valve. She developed aortic stenosis of prior AV valve. She had a syncopal episode which was investigated with loop recorder. No arrhythmias noted. She underwent Mechanical Aortic Valve Placement on 20 by Dr Dr. Jey Rainey. She required redo sternotomy for that. She had post op Aflutter which resolved prior to planned cardioversion. She had JAZZMINE on diuretics. Now transferred to ARU for Rehab.     She is doing well. No chest pain or sob. No fevers or chills.     Past Medical History    I have reviewed this patient's medical history and updated it with pertinent information if needed.   Past Medical History:   Diagnosis Date     Aortic aneurysm (H) 2012    Mild dilation of the ascending aorta measuring 3.8cm. Mild dilation of the ascending aorta measuring 3.8cm.     Aortic stenosis 2020    Added automatically from request for surgery [id number]     Aortic valve disorder 2020    A. S/p aortic valve replacement  with 21mm St. Romeo B. Last echocardiogram  demonstrating armida functioning valve -- mean gradient of 16mmHg. Normal LV size and function, no other significant valve disease. A. S/p aortic valve replacement  with 21mm St. Romeo B. Last echocardiogram  demonstrating armida functioning valve -- mean gradient of 16mmHg. Normal LV size and function, no oth     Dyslipidemia 2013     Early dry stage nonexudative age-related macular degeneration of both eyes 2020     Hypertension, benign essential, goal below 140/90 2013     Long term current use of anticoagulant therapy 2009     Osteoarthrosis involving lower leg  7/21/2020     Ostium secundum type atrial septal defect 9/19/2012    Closed during aortic valve replacement surgically in 2004 Closed during aortic valve replacement surgically in 2004     PFO (patent foramen ovale) 9/19/2012    Closed during aortic valve replacement surgically in 2004 Closed during aortic valve replacement surgically in 2004     Vitamin D deficiency 1/18/2013       Past Surgical History   I have reviewed this patient's surgical history and updated it with pertinent information if needed.  Past Surgical History:   Procedure Laterality Date     AS TOTAL KNEE ARTHROPLASTY Right 2015     CARPAL TUNNEL RELEASE RT/LT       CATARACT IOL, RT/LT       FEMUR SURGERY Left     fracture repair     REDO STERNOTOMY REPLACE VALVE AORTIC N/A 9/23/2020    Procedure: Redo sternotomy, lysis of adhesions, replacement of mechanical aortic valve with 19 mm St. Romeo Mechanical Heart Vave, on pump oxygenation.;  Surgeon: Jey Rainey MD;  Location: [facility] OR     REPAIR VALVE AORTIC  2004       Prior to Admission Medications   Prior to Admission Medications   Prescriptions Last Dose Informant Patient Reported? Taking?   Methylcellulose, Laxative, (CITRUCEL PO)   Yes No   Sig: Take 2 capsules by mouth daily   Multiple Vitamins-Minerals (OCUVITE ADULT FORMULA PO)   Yes No   Sig: Take 1 tablet by mouth daily    acetaminophen (TYLENOL) 325 MG tablet   No No   Sig: Take 2 tablets (650 mg) by mouth every 6 hours as needed for mild pain or fever   aspirin (ASA) 81 MG chewable tablet   No No   Sig: Take 1 tablet (81 mg) by mouth daily   calcium carbonate 600 mg-vitamin D 400 units (CALTRATE) 600-400 MG-UNIT per tablet   Yes No   Sig: Take 2 tablets by mouth daily   carboxymethylcellulose PF (REFRESH PLUS) 0.5 % ophthalmic solution   Yes No   Sig: Place 1 drop into both eyes 3 times daily as needed for dry eyes   dorzolamide-timolol (COSOPT) 2-0.5 % ophthalmic solution   Yes No   Sig: Place 1 drop into both eyes 2 times daily     furosemide (LASIX) 20 MG tablet   No No   Sig: Take 1 tablet (20 mg) by mouth daily   latanoprost (XALATAN) 0.005 % ophthalmic solution   Yes No   Sig: Place 1 drop into both eyes At Bedtime    melatonin 1 MG TABS tablet   No No   Sig: Take 1 tablet (1 mg) by mouth nightly as needed for sleep   metoprolol tartrate (LOPRESSOR) 25 MG tablet   No No   Sig: Take 2 tablets (50 mg) by mouth 2 times daily   multivitamin (CENTRUM SILVER) tablet   Yes No   Sig: Take 1 tablet by mouth daily   pantoprazole (PROTONIX) 40 MG EC tablet   No No   Sig: Take 1 tablet (40 mg) by mouth every morning (before breakfast) for 30 doses   potassium chloride ER (KLOR-CON M) 10 MEQ CR tablet   No No   Sig: Take 1 tablet (10 mEq) by mouth daily   simvastatin (ZOCOR) 20 MG tablet   Yes No   Sig: Take 20 mg by mouth At Bedtime    warfarin ANTICOAGULANT (COUMADIN) 1 MG tablet   No No   Sig: Take 3 mg PO daily at 6 pm until next INR check, then dose per INR goal 2-3      Facility-Administered Medications: None     Allergies   Allergies   Allergen Reactions     Blood Transfusion Related (Informational Only) Other (See Comments)     Patient has a history of a clinically significant antibody against RBC antigens.  A delay in compatible RBCs may occur.       Social History   I have reviewed this patient's social history and updated it with pertinent information if needed. Monika Bee  reports that she has never smoked. She has never used smokeless tobacco. She reports current alcohol use of about 6.0 standard drinks of alcohol per week.    Family History   I have reviewed this patient's family history and updated it with pertinent information if needed.   Family History   Problem Relation Age of Onset     Cardiovascular Father      Colon Cancer Father      Anesthesia Reaction No family hx of      Deep Vein Thrombosis (DVT) No family hx of        Review of Systems   The 10 point Review of Systems is negative other than noted in the HPI or  here.     Physical Exam                 O2 Device: None (Room air)    Vital Signs with Ranges  Temp:  [97.7  F (36.5  C)-98.4  F (36.9  C)] 98  F (36.7  C)  Pulse:  [] 95  Resp:  [18-20] 20  BP: (122-149)/(58-69) 122/69  SpO2:  [94 %-96 %] 94 %  0 lbs 0 oz    Constitutional: Awake, alert, cooperative, no apparent distress.  Eyes: Conjunctiva and pupils examined and normal.  HEENT: Moist mucous membranes  Respiratory: Clear to auscultation bilaterally, no crackles or wheezing.  Cardiovascular: Sternotomy wound healing. No redness noted. Regular rate and rhythm, normal S1 and S2, and no murmur noted.  GI: Soft, non-distended, non-tender, normal bowel sounds.  Lymph/Hematologic: No anterior cervical or supraclavicular adenopathy.  Skin: No rashes, no cyanosis, BL leg edema noted. Pitting on either side.   Musculoskeletal: No joint swelling, erythema or tenderness.  Neurologic: Cranial nerves 2-12 intact, normal strength and sensation.  Psychiatric: Alert, oriented to person, place and time, no obvious anxiety or depression.      Data   Data reviewed today:        Recent Labs   Lab 10/02/20  0532 10/01/20  0919 10/01/20  0555 09/30/20  0540   WBC 7.5  --  6.2 9.2   HGB 8.7*  --  8.6* 9.2*   *  --  107* 104*   *  --  394 355   INR 2.64* 2.51* 2.77* 2.30*     --  142 141   POTASSIUM 4.2 4.4 4.7 4.3   CHLORIDE 107  --  110* 110*   CO2 28  --  27 26   BUN 28  --  24 23   CR 1.15*  --  1.14* 0.99   ANIONGAP 4  --  5 4   RYANN 8.5  --  8.4* 8.8   GLC 99  --  97 100*     No results found for this or any previous visit (from the past 24 hour(s)).

## 2020-10-02 NOTE — PLAN OF CARE
"Patient is a 84 year old female admitted on 9/23 with aortic stenosis s/p mechanical AVR re-do and lysis of adhesions on 9/23. PMHx relevant for AVR & PFO closure (2004), HTN, GERD, glaucoma, and HLD.     BP (!) 149/64 (BP Location: Right arm)   Pulse 93   Temp 97.7  F (36.5  C) (Oral)   Resp 18   Ht 1.511 m (4' 11.49\")   Wt 81.2 kg (179 lb)   SpO2 94%   BMI 35.56 kg/m       A&Ox4, calls appropriately. Melatonin at HS for sleep. SR/sinus arrhythmia, VS stable on RA. Midline sternal incision NALINI. Multiple areas of \"tape burn\" from wound vac removal on 9/29, covered with gauze dressings. New dressing applied to old CT sites. Generalized edema improving. Purewick catheter in place overnight. Declined scheduled bowel meds for loose stools. Regular diet. R wrist PIV x1, saline locked. Denies pain.    Went to  for cardioversion yesterday for a-flutter, found to be in NSR & procedure cancelled. Discharge disposition changed from ARU to [facility]. Plan to discharge today, ride arranged with sister around noon. Continue to monitor and report changes to [facility].     Hilda Gould RN  6:27 AM  "

## 2020-10-02 NOTE — PLAN OF CARE
Physical Therapy Discharge Summary    Reason for therapy discharge:    Discharged to transitional care facility.    Progress towards therapy goal(s). See goals on Care Plan in [facility] electronic health record for goal details.  Goals not met.  Barriers to achieving goals:   discharge from facility.    Therapy recommendation(s):    Continued therapy is recommended.  Rationale/Recommendations:  increase safety and independence with functional mobility.

## 2020-10-02 NOTE — DISCHARGE INSTRUCTIONS
[facility]      AFTER YOU GO HOME FROM YOUR HEART SURGERY  ([facility] Rainey [facility])    You had a sternotomy, avoid lifting anything greater than ten pounds for 6 weeks after surgery and then less than 20 pounds for an additional 6 weeks. Do not reach backwards or use arms to push out of chair. Do not let people pull on your arms to assist with standing. Avoid twisting or reaching too far across your body.  Avoid strenuous activities such as bowling, vacuuming, raking, shoveling, golf or tennis for 12 weeks after your surgery. It is okay to resume sex if you feel comfortable in doing so. You may have to try different positions with your partner.  Splint your chest incision by hugging a pillow or bringing your arms across your chest when coughing or sneezing.      No driving for 4 weeks after surgery or while on pain medication.    Shower or wash your incisions daily with soap and water (or as instructed), pat dry. Keep wound clean and dry, showers are okay after discharge, but don't let spray hit directly on incision. No baths or swimming for 1 month. Cover chest tube sites with gauze until they stop draining, then leave open to air. It is not abnormal for chest tube sites to drain yellowish/clear fluid for up to 2-3 weeks after surgery.   Watch for signs of infection: increased redness, tenderness, warmth or any drainage that appears infected (pus like) or is persistent.  Also a temperature > 100.5 F or chills. Call your surgeon or primary care provider's office immediately. Remove any skin glue left on incisions after 10-14 days. This will not affect your incision and can speed up healing.    Exercise is very important in your recovery. Please follow the guidelines set up for you in your cardiac rehab classes at the hospital. If outpatient cardiac rehab was ordered for you, we highly recommend you participate. If you have problems arranging your cardiac rehab, please call  "[phone] for all locations, with the exception of [place], please call [phone] and [place], please call [phone].    Avoid sitting for prolonged periods of time, try to walk every hour during the day. If you have a leg incision, elevate your leg often when you are not walking.    Check your weight when you get home from the hospital and continue to check it daily through your recovery for at least a month. If you notice a weight gain of 2-3 pounds in a week, notify your primary care physician, cardiologist or surgeon.    Bowel activity may be slow after surgery. If necessary, you may take an over the counter laxative such as Milk of Magnesia or Miralax. You may have stool softeners prescribed (docusate sodium, Senokot). We recommend using stool softeners while using narcotics for pain (oxycodone/percocet, hydrocodone/vicodin, hydromorphone/dilaudid).      Wean OFF of narcotics (oxycodone, dilaudid, hydrocodone) as soon as possible. You should continue taking acetaminophen as long as you have any surgical pain as the first choice for pain control and add narcotics as necessary for pain to be tolerable.      DENTAL VISITS AFTER SURGERY  You have had your heart valve repaired or replaced, we do not recommend having any dental work done for 6 months and you will need to take an antibiotic prior to dental visits from now on.  Please notify your dentist before any procedure for the proper treatment needed. The antibiotic is taken by mouth one hour prior to visit. This includes routine cleanings.  You can sometimes hear a mechanical valve \"clicking,\" this is normal and not a sign of something wrong.    DO NOT SMOKE.  IF YOU NEED HELP QUITTING, PLEASE TALK WITH YOUR CARDIOLOGIST OR PRIMARY DOCTOR.    You are on a blood thinner, follow the instructions you were given in the hospital and DO NOT SKIP this medication. Try and take it the same time everyday. Your primary care physician or coumadin clinic will " manage the dosing. INR goal is 2-3.    REGARDING PRESCRIPTION REFILLS.  If you need a refill on your pain medication contact us to discuss your pain and a possible one time refill.   All other medications will be adjusted, discontinued and re-filled by your primary care physician and/or your cardiologist as they were prior to your surgery. We have given you enough for one to three month with possibly one refill.    POST-OPERATIVE CLINIC VISITS  You have a follow up TELEPHONE visit with CV[facility] Surgery Advance Care Practitioners on 10/9/20 at 2 pm.  You will then return to the care of your primary provider and your cardiologist. Future medication refills should come from your PCP or Cardiologist.   You should see your primary care provider in 2-4 weeks after discharge.   It is important to see your cardiologist about 4-6 weeks after discharge.    If you do not hear from a  in 7 days, please call [phone] (choose option 1) and request to be seen with a general cardiologist or someone that you have seen in the past.   If there is a need to return to see CT Surgery please call our  at [phone].    SURGICAL QUESTIONS  Please call Rocio Schaefer with surgical recovery and medication questions, the phone number is listed below.  She can assist you with your needs and contact other surgery care team members as indicated.    On weekends or after hours, please call [phone] and ask the  to   page the Cardiothoracic Surgery fellow on call.      Thank you,    Your Cardiothoracic Surgery Team  Rocio Schaefer RN Care Coordinator-  [phone]   Chanda Coronado NP

## 2020-10-02 NOTE — PLAN OF CARE
Occupational Therapy Discharge Summary    Reason for therapy discharge:    Discharged to transitional care facility.    Progress towards therapy goal(s). See goals on Care Plan in [facility] electronic health record for goal details.  Goals partially met.  Barriers to achieving goals:   discharge from facility.    Therapy recommendation(s):    Continued therapy is recommended.  Rationale/Recommendations:  Pt would benefit from continued OT services to increase activity tolerance and endurance for independence with ADL/IADL.  Continue home exercise program.

## 2020-10-02 NOTE — PROGRESS NOTES
Care Management Discharge Note    Discharge Planning:  Expected Discharge Date: 10/02/20 at 1200  Concerns to be Addressed: None    Anticipated Discharge Disposition:    [facility],  [address] [address]  Ph: [phone]  **Please call above number for RN report**    Anticipated Discharge Services:  SNF  Anticipated Discharge DME:  None    Patient/family educated on Medicare website which has current facility and service quality ratings:  YES  Referrals Placed by CM/SW:  SNF  Education Provided on the Discharge Plan:  YES  Patient/Family in Agreement with the Plan:  YES  Disposition Comments:    PAS completed, ref#[id number]. Family will transport- they verbalize understanding about visitor restrictions (no TCU visitors allowed) and driving restrictions. IMM completed. CVTS PA completing discharge paperwork now. SW completed CTS handoff, as well.     GUDELIA updated [facility], [facility] via page, nursing staff, patient and sister Pat.       Juany Gates MSW, [facility] Hospital[facility]  6C Cardiology Unit   [facility]  Phone: [phone]  Pager: [phone]

## 2020-10-02 NOTE — PROGRESS NOTES
Patient is a 85 year old female  admitted to room 401 via HE.  Patient is alert and oriented X 3. See Epic for VS and assessment.  Patient is able to transfer SBA using walker. Patient was settled into their room, shown call light, tv, mealtimes etc. Oriented to unit. Will continue monitoring pain level and VS. Notifying MD with any concerns.  Follow MD orders for cares and medications.    Level of Schooling:Masters  Ethnicity:  Marital Status:single  Dentures: No  Hearing Aid: No  Smoker:  No  Glasses: Yes  Occupation: retired  Falls 0-1 mo: 0 2-6 mo: 5  Stairs prior function: Independent  Prior device use: Walker   Advanced Care Directive Referral to Social Work?No

## 2020-10-02 NOTE — PROGRESS NOTES
DISCHARGE   Discharged to: [facility]  Via: Automobile  Accompanied by: sister  Discharge Instructions: principal diagnosis, major findings/procedures, diet, activity, medications, follow up appointments, when to call the MD, and what to watchout for (i.e. s/s of infection, increasing SOB, palpitations, Angina). Pt states understanding of all discharge instructions.   Prescriptions:   Follow Up Appointments:   Belongings: All sent with pt. Pt verifies that they are taking all belongings with them.   IV: discontinued.   Telemetry: discontinued.   Pt exhibits understanding of above discharge instructions; all questions answered.  Discharge Paperwork: faxed to discharge planner.

## 2020-10-02 NOTE — PROGRESS NOTES
"   10/02/20 1528   Visit Information   Visit Made By Staff    Type of Visit Initial;Staff consultation/triage  (televisit)   Visit Location (if NOT Inpatient)   Transitional Care Unit   Interventions   Plan of Care Review   With patient/family/proxy   Basic Spiritual Interventions    introduction/orientation to Spiritual Health Services;Assessment of spiritual needs/resources;Prayer;Life Review   Advanced Assessments/Interventions   Presenting Concerns/Issues Spiritual/Temple/emotional support;Other  (Pt requested Religious communion.)   SPIRITUAL HEALTH SERVICES: Tele-Encounter  Patient Location (Hospital, [facility], Unit): [facility], , 4R  Spoke with (patient, family relationship): Pt      Referral Source: Epic consult    If applicable: patient was appropriately screened for telechaplaincy support with bedside nurse prior to visit (e.g. Mental Health and Addiction contexts). See call details below.    DATA:  Monika said that she would love to have Religious communion if that was possible. 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 She has two sisters still living in [place], where they are all from.  Monika lives there as well and said that she has lots of friends who will be calling her on the TCU.    Monika asked that we say the \"Our Father\" , which we did.  She explained that today is the feast of the Guardian [place] and \"My wendie is going to be watching over me.\"       PLAN:  I emailed our , Fr. Lazar, to see if he might be able to bring communion to Monika. (I let her know that I couldn't promise that this would be possible.) I let Monika know that I would connect with her again on the TCU while she is here.    Chaplain TESSA SANCHEZ    ______________________________    Type of service:  Telephone Visit     has received verbal consent for a TelephoneVisit from the patient? Yes    Distance Provider " Location: designated [facility] office or home office (secure setting)    Mode of Communication: telephone (via [employer] phone or [employer] tele-call-number ([phone]))

## 2020-10-02 NOTE — PHARMACY-MEDICATION REGIMEN REVIEW
Pharmacy Medication Regimen Review  Monika Bee is a 85 year old female who is currently in the Transitional Care Unit.    Assessment: All medications have an appropriate indications, durations and no unnecessary use was found    Plan:   Continue medications as ordered.  Pharmacy will dose warfarin using INR.  Monitor for s/s of bleeding.  Attending provider will be sent this note for review.  If there are any emergent issues noted above, pharmacist will contact provider directly by phone.      Pharmacy will periodically review the resident's medication regimen for any PRN medications not administered in > 72 hours and discontinue them. The pharmacist will discuss gradual dose reductions of psychopharmacologic medications with interdisciplinary team on a regular basis.    Please contact pharmacy if the above does not answer specific medication questions/concerns.    Background:  A pharmacist has reviewed all medications and pertinent medical history today.  Medications were reviewed for appropriate use and any irregularities found are listed with recommendations.      Current Facility-Administered Medications:      [START ON 10/5/2020] - Skin Test Reading -, , Does not apply, Q21 Days, Erwin Chavez MD     acetaminophen (TYLENOL) tablet 650 mg, 650 mg, Oral, Q6H PRN, Erwin Chavez MD     [START ON 10/3/2020] aspirin (ASA) chewable tablet 81 mg, 81 mg, Oral, Daily, Erwin Chavez MD     calcium carbonate 600 mg-vitamin D 400 units (CALTRATE) per tablet 2 tablet, 2 tablet, Oral, Daily, Erwin Chavez MD     carboxymethylcellulose PF (REFRESH PLUS) 0.5 % ophthalmic solution 1 drop, 1 drop, Both Eyes, TID PRN, Erwin Chavez MD     dorzolamide-timolol (COSOPT) ophthalmic solution 1 drop, 1 drop, Both Eyes, BID, Erwin Chavez MD     [START ON 10/3/2020] furosemide (LASIX) tablet 20 mg, 20 mg, Oral, Daily, Erwin Chavez MD     latanoprost (XALATAN) 0.005 % ophthalmic solution 1 drop, 1 drop, Both  Eyes, At Bedtime, Erwin Chavez MD     methylcellulose (CITRUCEL) tablet 500 mg, 500 mg, Oral, Daily, Erwin Chavez MD     metoprolol tartrate (LOPRESSOR) tablet 50 mg, 50 mg, Oral, BID, Erwin Chavez MD     multivitamin w/minerals (THERA-VIT-M) tablet 1 tablet, 1 tablet, Oral, Daily, Erwin Chavez MD     Nurse may request from Pharmacy a change of form of medication (e.g. Liquid to tablet)., , Does not apply, Continuous PRN, Erwin Chavez MD     [START ON 10/3/2020] pantoprazole (PROTONIX) EC tablet 40 mg, 40 mg, Oral, QAM AC, Erwin Chavez MD     Patient is already receiving anticoagulation with heparin, enoxaparin (LOVENOX), warfarin (COUMADIN)  or other anticoagulant medication, , Does not apply, Continuous PRN, Erwin Chavez MD     [START ON 10/3/2020] potassium chloride ER (KLOR-CON M) CR tablet 10 mEq, 10 mEq, Oral, Daily, Erwin Chavez MD     simvastatin (ZOCOR) tablet 20 mg, 20 mg, Oral, At Bedtime, Erwin Chavez MD     [START ON 10/3/2020] tuberculin injection 5 Units, 5 Units, Intradermal, Q21 Days, Erwin Chavez MD     warfarin ANTICOAGULANT (COUMADIN) tablet 2 mg, 2 mg, Oral, ONCE at 18:00, Erwin Chavez MD     Warfarin Therapy Reminder (Check START DATE - warfarin may be starting in the FUTURE), 1 each, Does not apply, Continuous PRN, Erwin Chavez MD  No current outpatient prescriptions on file.   S/p mechanical AVR  HTN

## 2020-10-02 NOTE — DISCHARGE SUMMARY
[facility], [place]   Cardiothoracic Surgery Hospital Discharge Summary     Monika Bee MRN# [id number]   Age: 85 year old YOB: 1935     Admitting Physician:  Jey Rainey MD  Discharge Physician:  CHU Ennis  Primary Care Physician:        D[facility]     DATE OF ADMISSION: 2020      DATE OF DISCHARGE: 2020          Primary Diagnoses:   1. Hx bicuspid AV and aortic stenosis of prior mechanical AV valve  2. S/P redo sternotomy and new mechanical AVR   3. Paroxysmal Atrial Flutter, variable AV block. Discharged in NSR.   4. JAZZMINE secondary to diuresis, stable  5. [facility]. INR goal 2-3           Secondary Diagnoses:   1. HTN   2. HLD   3. Vit D deficiency   4. Hx PFO closure   5. Right axilla wounds due to Fungal  6. Allison cleft wounds due to Intertrigo    PROCEDURES PERFORMED:   Date: 20.  Surgeon: Dr. Jey Rainey   1.  Redo median sternotomy.   2.  Redo aortic valve replacement with 19-mm St. Romeo [place]'s mechanical valve.     INTRAOPERATIVE COMPLICATIONS:  none    PATHOLOGY RESULTS:    Surgical Pathology 20-     A. Explanted mechanical aortic valve:  valve as described grossly     B. Subvalvular tissue, biopsy: Benign fibrous tissue with synthetic material, giant cell reaction,  fibrosis and calcification    CULTURE RESULTS:  none    CONSULTS:    1.  PT/OT  2.  Electrophysiology     BRIEF HISTORY OF ILLNESS:  Ms. Bee has a history of aortic stenosis with mechanical aortic valve replacement about 20 years ago. She was found to have severe prosthetic aortic valve stenosis with a mean gradient of 46 mmHg, EF was normal at 65. She also had moderate mitral stenosis. She had a syncopal episode and had an implantable loop recorder placed. She had another syncopal episode in early February, but her loop recorder interrogation on 2020 showed no obvious cardiac rhythm responsible  "for syncope. She had previous thoracotomy for aortic valve replacement. She did have some stable dyspnea on exertion, but she finds that not to be unusual. She now presented for elective redo AVR.     HOSPITAL COURSE: Monika Bee is a 85 year old female who on 9/23/2020 underwent the above-named procedures.  She tolerated the operation well and postoperatively was admitted to the CVICU.  She was extubated on POD # 1 to 4 lpm via NC with neuro status intact.  Her ICU stay was uncomplicated and treatment included the usual hemodynamic support and weaning of pressors.  Chest tubes and TPW removed without complication.     She was transferred to the post-surgical telemetry unit on POD # 4.  She tolerated  diuresis but had a mild stable JAZZMINE on day of discharge. She also had some HD stable atrial flutter but converted to NSR before planned cardioversion. She remained in NSR through day of discharge. INR was therapeutic before discharge.      Prior to discharge, her pain was controlled well, she was able to perform most ADLs and ambulate with assistance, and had full return of bowel and bladder function.  On October 2, 2020, she was discharged to [facility] in stable condition.    Patient discharged on aspirin:  Yes 81 mg  Patient discharged on beta blocker: yes    Patient discharged on ACE Inhibitor/ARB: not indicated           Discharge Disposition:     Discharged to rehabilitation facility            Condition on Discharge:     Discharge condition: Stable   Discharge vitals: Blood pressure (!) 145/67, pulse 94, temperature 98.1  F (36.7  C), temperature source Oral, resp. rate 20, height 1.511 m (4' 11.49\"), weight 81.2 kg (179 lb), SpO2 95 %.     Code status on discharge: Full Code     Vitals:    09/29/20 0900 09/30/20 0628 10/01/20 0604   Weight: 83.6 kg (184 lb 6.4 oz) 82.1 kg (180 lb 14.4 oz) 81.2 kg (179 lb)   Admit 176 lbs  Discharge 179 lbs     DAY OF DISCHARGE PHYSICAL EXAM:    MAPs: 74 - 109   Gen:  " NAD, conversational  CV: RRR, S1S2 normal, no murmurs, rubs, or gallops  Pulm:  CTA, no rhonchi or wheezes  Abd:  Soft, nondistended, NTTP  Ext: moderate dependent edema, + 2 pitting  Incision: Clean, dry, intact, no erythema  Chest Tube sites: Dressings clean and dry      BMP  Recent Labs   Lab 10/02/20  0532 10/01/20  0919 10/01/20  0555 09/30/20  0540 09/29/20  0530     --  142 141 142   POTASSIUM 4.2 4.4 4.7 4.3 4.0   CHLORIDE 107  --  110* 110* 111*   RYANN 8.5  --  8.4* 8.8 8.4*   CO2 28  --  27 26 26   BUN 28  --  24 23 25   CR 1.15*  --  1.14* 0.99 0.93   GLC 99  --  97 100* 101*     CBC  Recent Labs   Lab 10/02/20  0532 10/01/20  0555 09/30/20  0540 09/29/20  0530   WBC 7.5 6.2 9.2 7.6   RBC 2.71* 2.64* 2.86* 2.81*   HGB 8.7* 8.6* 9.2* 9.2*   HCT 28.5* 28.2* 29.6* 29.2*   * 107* 104* 104*   MCH 32.1 32.6 32.2 32.7   MCHC 30.5* 30.5* 31.1* 31.5   RDW 13.4 13.4 13.3 13.4   * 394 355 291     INR  Recent Labs   Lab 10/02/20  0532 10/01/20  0919 10/01/20  0555 09/30/20  0540   INR 2.64* 2.51* 2.77* 2.30*      Liver Function Studies -   Recent Labs   Lab Test 09/24/20  0401   PROTTOTAL 5.6*   ALBUMIN 3.4   BILITOTAL 1.1   ALKPHOS 50   AST 41   ALT 15     Recent Labs   Lab 10/02/20  0532 10/01/20  0555 09/30/20  0540 09/29/20  0530 09/28/20  1439 09/28/20  0515 09/27/20  2115 09/27/20  1702 09/27/20  1232 09/27/20  0733 09/27/20  0424 09/26/20  1933   GLC 99 97 100* 101*  --  91  --   --   --  93  --   --    BGM  --   --   --   --  112*  --  103* 87 121*  --  83 126*       ECHOCARDIOGRAM, 9/29/2020-   Interpretation Summary  Technically difficult study.Extremely poor acoustic windows.  Limited information was obtained during study.  S/P AVR with 19mm St. Romeo [place] mechanical valveo on 9/25/2020. Mean gradient 9mmHg.  No AI.   Left ventricular function, chamber size, wall motion, and wall thickness are normal.  The EF is 60-65%.  No regional wall motion abnormalities are seen.  Right ventricular  function is normal.  No pericardial effusion is present.  There is no prior study for direct comparison.  ___________________  Left Ventricle-  Left ventricular function, chamber size, wall motion, and wall thickness are  normal.The EF is 60-65%. Left ventricular diastolic function is not assessable. No regional wall  motion abnormalities are seen.     Right Ventricle-  Right ventricular function, chamber size, wall motion, and thickness are normal.     Atria-  Both atria appear normal.     Mitral Valve-  The valve leaflets are not well visualized. On Doppler interrogation, there is  no significant stenosis or regurgitation. Moderate mitral annular calcification is present.     Aortic Valve-  S/P AVR with 19mm St. Romeo [place] mechanical valveo on 9/25/2020. Mean  gradient 9mmHg. No AI.     Tricuspid Valve-  The tricuspid valve cannot be assessed. Pulmonary artery systolic pressure  cannot be assessed.     Pulmonic Valve-  The valve leaflets are not well visualized. On Doppler interrogation, there is  no significant stenosis or regurgitation.     Vessels-  The thoracic aorta cannot be assessed. The aorta root cannot be assessed. The  inferior vena cava cannot be assessed. The pulmonary artery cannot be assessed.     Pericardium-  No pericardial effusion is present.     Compared to Previous Study-  There is no prior study for direct comparison.     Attestation  I have personally viewed the imaging and agree with the interpretation and  report as documented by the fellow, Tony Rios, and/or edited by me.  ___________________________________________________  MMode/2D Measurements & Calculations  IVSd: 0.88 cm  LVIDd: 3.3 cm  LVIDs: 2.3 cm  LVPWd: 1.1 cm  FS: 31.5 %  LV mass(C)d: 91.6 grams  LV mass(C)dI: 51.3 grams/m2  asc Aorta Diam: 3.2 cm  LVOT diam: 1.9 cm  LVOT area: 2.8 cm2  LA Volume (BP): 40.1 ml     LA Volume Index (BP): 22.5 ml/m2  RWT: 0.65     Doppler Measurements & Calculations  Ao V2 max: 213.3  cm/sec  Ao max P.2 mmHg  Ao V2 mean: 141.4 cm/sec  Ao mean P.2 mmHg  Ao V2 VTI: 35.3 cm  PA V2 max: 75.6 cm/sec  PA max P.3 mmHg  PA acc time: 0.12 sec      DISCHARGE INSTRUCTIONS:  You had a sternotomy, avoid lifting anything greater than ten pounds for 6 weeks after surgery and then less than 20 pounds for an additional 6 weeks. Do not reach backwards or use arms to push out of chair. Do not let people pull on your arms to assist with standing. Avoid twisting or reaching too far across your body.  Avoid strenuous activities such as bowling, vacuuming, raking, shoveling, golf or tennis for 12 weeks after your surgery. It is okay to resume sex if you feel comfortable in doing so. You may have to try different positions with your partner.  Splint your chest incision by hugging a pillow or bringing your arms across your chest when coughing or sneezing.      No driving for 4 weeks after surgery or while on pain medication.    Shower or wash your incisions daily with soap and water (or as instructed), pat dry. Keep wound clean and dry, showers are okay after discharge, but don't let spray hit directly on incision. No baths or swimming for 1 month. Cover chest tube sites with gauze until they stop draining, then leave open to air. It is not abnormal for chest tube sites to drain yellowish/clear fluid for up to 2-3 weeks after surgery.   Watch for signs of infection: increased redness, tenderness, warmth or any drainage that appears infected (pus like) or is persistent.  Also a temperature > 100.5 F or chills. Call your surgeon or primary care provider's office immediately. Remove any skin glue left on incisions after 10-14 days. This will not affect your incision and can speed up healing.    Exercise is very important in your recovery. Please follow the guidelines set up for you in your cardiac rehab classes at the hospital. If outpatient cardiac rehab was ordered for you, we highly recommend you  "participate. If you have problems arranging your cardiac rehab, please call [phone] for all locations, with the exception of [place], please call [phone] and [place], please call [phone].    Avoid sitting for prolonged periods of time, try to walk every hour during the day. If you have a leg incision, elevate your leg often when you are not walking.    Check your weight when you get home from the hospital and continue to check it daily through your recovery for at least a month. If you notice a weight gain of 2-3 pounds in a week, notify your primary care physician, cardiologist or surgeon.    Bowel activity may be slow after surgery. If necessary, you may take an over the counter laxative such as Milk of Magnesia or Miralax. You may have stool softeners prescribed (docusate sodium, Senokot). We recommend using stool softeners while using narcotics for pain (oxycodone/percocet, hydrocodone/vicodin, hydromorphone/dilaudid).      Wean OFF of narcotics (oxycodone, dilaudid, hydrocodone) as soon as possible. You should continue taking acetaminophen as long as you have any surgical pain as the first choice for pain control and add narcotics as necessary for pain to be tolerable.      DENTAL VISITS AFTER SURGERY  You have had your heart valve repaired or replaced, we do not recommend having any dental work done for 6 months and you will need to take an antibiotic prior to dental visits from now on.  Please notify your dentist before any procedure for the proper treatment needed. The antibiotic is taken by mouth one hour prior to visit. This includes routine cleanings.  You can sometimes hear a mechanical valve \"clicking,\" this is normal and not a sign of something wrong.    DO NOT SMOKE.  IF YOU NEED HELP QUITTING, PLEASE TALK WITH YOUR CARDIOLOGIST OR PRIMARY DOCTOR.    You are on a blood thinner, follow the instructions you were given in the hospital and DO NOT SKIP this medication. Try and take it the " same time everyday. Your primary care physician or coumadin clinic will manage the dosing. INR goal is 2-3.    REGARDING PRESCRIPTION REFILLS.  If you need a refill on your pain medication contact us to discuss your pain and a possible one time refill.   All other medications will be adjusted, discontinued and re-filled by your primary care physician and/or your cardiologist as they were prior to your surgery. We have given you enough for one to three month with possibly one refill.    POST-OPERATIVE CLINIC VISITS  You have a follow up TELEPHONE visit with [facility] Advance Care Practitioners on 10/9/20 at 2 pm.  You will then return to the care of your primary provider and your cardiologist. Future medication refills should come from your PCP or Cardiologist.   You should see your primary care provider in 2-4 weeks after discharge.   It is important to see your cardiologist about 4-6 weeks after discharge.      PRE-ADMISSION MEDICATIONS:  No current facility-administered medications on file prior to encounter.        enoxaparin ANTICOAGULANT (LOVENOX) 80 MG/0.8ML syringe,        DISCHARGE MEDICATIONS:    Joseline Bee   Home Medication Instructions PHYLICIA:[id number]    Printed on:10/02/20 1044   Medication Information                      acetaminophen (TYLENOL) 325 MG tablet  Take 2 tablets (650 mg) by mouth every 6 hours as needed for mild pain or fever             aspirin (ASA) 81 MG chewable tablet  Take 1 tablet (81 mg) by mouth daily             calcium carbonate 600 mg-vitamin D 400 units (CALTRATE) 600-400 MG-UNIT per tablet  Take 2 tablets by mouth daily             carboxymethylcellulose PF (REFRESH PLUS) 0.5 % ophthalmic solution  Place 1 drop into both eyes 3 times daily as needed for dry eyes             dorzolamide-timolol (COSOPT) 2-0.5 % ophthalmic solution  Place 1 drop into both eyes 2 times daily              furosemide (LASIX) 20 MG tablet  Take 1 tablet (20 mg) by mouth daily              latanoprost (XALATAN) 0.005 % ophthalmic solution  Place 1 drop into both eyes At Bedtime              melatonin 1 MG TABS tablet  Take 1 tablet (1 mg) by mouth nightly as needed for sleep             Methylcellulose, Laxative, (CITRUCEL PO)  Take 2 capsules by mouth daily             metoprolol tartrate (LOPRESSOR) 25 MG tablet  Take 2 tablets (50 mg) by mouth 2 times daily             Multiple Vitamins-Minerals (OCUVITE ADULT FORMULA PO)  Take 1 tablet by mouth daily              multivitamin (CENTRUM SILVER) tablet  Take 1 tablet by mouth daily             pantoprazole (PROTONIX) 40 MG EC tablet  Take 1 tablet (40 mg) by mouth every morning (before breakfast) for 30 doses             potassium chloride ER (KLOR-CON M) 10 MEQ CR tablet  Take 1 tablet (10 mEq) by mouth daily             simvastatin (ZOCOR) 20 MG tablet  Take 20 mg by mouth At Bedtime              warfarin ANTICOAGULANT (COUMADIN) 1 MG tablet  Take 3 mg PO daily at 6 pm until next INR check, then dose per INR goal 2-3                 CC:D[facility] Aguila Kris   [address] [place] [id number]    [phone] (Fax)      [facility]   Cardiothoracic Surgery  Office phone: [phone]  Office fax: [phone]

## 2020-10-03 ENCOUNTER — APPOINTMENT (OUTPATIENT)
Dept: OCCUPATIONAL THERAPY | Facility: SKILLED NURSING FACILITY | Age: 85
End: 2020-10-03
Payer: COMMERCIAL

## 2020-10-03 ENCOUNTER — APPOINTMENT (OUTPATIENT)
Dept: PHYSICAL THERAPY | Facility: SKILLED NURSING FACILITY | Age: 85
End: 2020-10-03
Payer: COMMERCIAL

## 2020-10-03 LAB — INR PPP: 2.6 (ref 0.86–1.14)

## 2020-10-03 PROCEDURE — 97110 THERAPEUTIC EXERCISES: CPT | Mod: GP | Performed by: PHYSICAL THERAPIST

## 2020-10-03 PROCEDURE — 97116 GAIT TRAINING THERAPY: CPT | Mod: GP | Performed by: PHYSICAL THERAPIST

## 2020-10-03 PROCEDURE — 97162 PT EVAL MOD COMPLEX 30 MIN: CPT | Mod: GP | Performed by: PHYSICAL THERAPIST

## 2020-10-03 PROCEDURE — 250N000009 HC RX 250: Performed by: HOSPITALIST

## 2020-10-03 PROCEDURE — 250N000013 HC RX MED GY IP 250 OP 250 PS 637: Performed by: HOSPITALIST

## 2020-10-03 PROCEDURE — 120N000009 HC R&B SNF

## 2020-10-03 PROCEDURE — 97535 SELF CARE MNGMENT TRAINING: CPT | Mod: GO

## 2020-10-03 PROCEDURE — 85610 PROTHROMBIN TIME: CPT | Performed by: HOSPITALIST

## 2020-10-03 PROCEDURE — 97165 OT EVAL LOW COMPLEX 30 MIN: CPT | Mod: GO

## 2020-10-03 PROCEDURE — 250N000013 HC RX MED GY IP 250 OP 250 PS 637

## 2020-10-03 PROCEDURE — 36415 COLL VENOUS BLD VENIPUNCTURE: CPT | Performed by: HOSPITALIST

## 2020-10-03 RX ORDER — WARFARIN SODIUM 3 MG/1
3 TABLET ORAL
Status: COMPLETED | OUTPATIENT
Start: 2020-10-03 | End: 2020-10-03

## 2020-10-03 RX ADMIN — ASPIRIN 81 MG CHEWABLE TABLET 81 MG: 81 TABLET CHEWABLE at 08:15

## 2020-10-03 RX ADMIN — CALCIUM CARBONATE 600 MG (1,500 MG)-VITAMIN D3 400 UNIT TABLET 2 TABLET: at 08:15

## 2020-10-03 RX ADMIN — DORZOLAMIDE HYDROCHLORIDE AND TIMOLOL MALEATE 1 DROP: 20; 5 SOLUTION/ DROPS OPHTHALMIC at 08:17

## 2020-10-03 RX ADMIN — METOPROLOL TARTRATE 50 MG: 25 TABLET, FILM COATED ORAL at 21:14

## 2020-10-03 RX ADMIN — METHYLCELLULOSE 500 MG: 500 TABLET ORAL at 08:15

## 2020-10-03 RX ADMIN — WARFARIN SODIUM 3 MG: 3 TABLET ORAL at 18:05

## 2020-10-03 RX ADMIN — SIMVASTATIN 20 MG: 20 TABLET, FILM COATED ORAL at 21:14

## 2020-10-03 RX ADMIN — MULTIPLE VITAMINS W/ MINERALS TAB 1 TABLET: TAB at 08:15

## 2020-10-03 RX ADMIN — METOPROLOL TARTRATE 50 MG: 25 TABLET, FILM COATED ORAL at 08:15

## 2020-10-03 RX ADMIN — LATANOPROST 1 DROP: 50 SOLUTION OPHTHALMIC at 21:14

## 2020-10-03 RX ADMIN — DORZOLAMIDE HYDROCHLORIDE AND TIMOLOL MALEATE 1 DROP: 20; 5 SOLUTION/ DROPS OPHTHALMIC at 21:14

## 2020-10-03 RX ADMIN — ACETAMINOPHEN 650 MG: 325 TABLET, FILM COATED ORAL at 14:15

## 2020-10-03 RX ADMIN — POTASSIUM CHLORIDE 10 MEQ: 750 TABLET, EXTENDED RELEASE ORAL at 08:16

## 2020-10-03 RX ADMIN — Medication 5 UNITS: at 08:24

## 2020-10-03 RX ADMIN — FUROSEMIDE 20 MG: 20 TABLET ORAL at 08:16

## 2020-10-03 RX ADMIN — PANTOPRAZOLE SODIUM 40 MG: 40 TABLET, DELAYED RELEASE ORAL at 06:52

## 2020-10-03 ASSESSMENT — ACTIVITIES OF DAILY LIVING (ADL): PREVIOUS_RESPONSIBILITIES: MEAL PREP;HOUSEKEEPING;LAUNDRY;SHOPPING;MEDICATION MANAGEMENT;FINANCES;DRIVING

## 2020-10-03 ASSESSMENT — MIFFLIN-ST. JEOR: SCORE: 1192.56

## 2020-10-03 NOTE — PLAN OF CARE
Precautions: sternal    Discharge Planner OT   Patient plan for discharge: home to her apt, 2 sisters in area available to assist pt as needed per pt but pt does not want to stay at sisters' homes if possible, pt reports having reacher at home, and access to a 4 wheeled walker and tub chair, and has cane at home.. Pt does not have stairs at home and was indep w/ all adls/iadls /mobility at home PTA and only used cane for long distances in community.. She reported she has a friend planning to drive her home at time of discharge. Pt lives in [place], [place]. Pt is receptive to home care and AE if needed at discharge.   Current status: sba w/ ADL w/ use of reacher and walker , basic mobility w/walker  and sba to cga   Barriers to return to prior living situation: pt lives alone and needs to be indep w/ mobility and most ADLs while following sternal precautions.  Recommendations for discharge: home w/ home care and home OT and family to assist as needed  Rationale for recommendations: pt lives alone and is currently not indep w/ daily activities and mobilty       Entered by: Dolly Arguello 10/03/2020 12:12 PM

## 2020-10-03 NOTE — PLAN OF CARE
"RN: New admit yesterday. Pt is alert and oriented. Some discomfort along the incision per pt but \"tolerable\". Denied need for PRN pain med or other intervention. Mid-chest incision is well approximated- NALINI. L upper abd has dry shadow of serosanguinous drainage on the dressing and chest has dressings CDI. R upper medial abd ( under the breast fold) has an intact blister from tape burns. Tegaderm dressing applied. Independent with bed mobility.  Able to maintain Sternal prec by hugging self per pt- heart pillow was accidentally bought home by her sister. SBA to BR with walker. Noted to be doing pursed-loip breathing. Able to do own kwabena care. Appear to be sleeping/resting between cares. Using call appropriately. Continue with plan of care.    Patient's most recent vital signs are:     Vital signs:  BP: 155/59  Temp: 97.8  HR: 101  RR: 16  SpO2: 96 %     Patient does not have new respiratory symptoms.  Patient does not have new sore throat.  Patient does not have a fever greater than 99.5.           "

## 2020-10-03 NOTE — PLAN OF CARE
Discharge Planner PT   Patient plan for discharge: alone in apt in [place]; pt has couple sisters in area to assist if needed. Has cheli here, a 4ww available at home.  Current status: SBA transfers amb 175 ft; CGA for bed mobility due to sternal precautions  Barriers to return to prior living situation: sternal precautions.  Recommendations for discharge: home PT transition to OP cardiac  Rationale for recommendations: ongoing cardiovascular conditioning       Entered by: Dolly Stephens 10/03/2020 2:33 PM

## 2020-10-03 NOTE — PROGRESS NOTES
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 she has 2 youger sisters avail to assist but do not plan to stay w/ her , pt reports having std ht toilet at home and no grab bars, tub/shower combo w/ access to a tub chair that sits in tub and one grab bar mounted in tub and hand held shower, pt reports haivng reacher and hurry-cane at home but only used cane for long distances in community, pt reports being indep w/ all adls/iadls /mobility priior to admitt/surgery. pt reports doing her own cleaning, cooking, shopping and drove. pt states 1x/2 weeks doing her laundry at her sister's house. carried laundry in \"big black garbage bages\" to and from sister's house.    Self-Care   Usual Activity Tolerance moderate   Current Activity Tolerance fair   Regular Exercise No  (prior to 1 yr ago walked daily/ used exer equip at times)   Equipment Currently Used at Home cane, straight;dressing device;grab bar, tub/shower;walker, rolling   Activity/Exercise/Self-Care Comment OT:pt reports she walked daily and used gym to exer sometime until about 1 year ago. reports has not been exer in the past year and has :\" put on wt\".   Disability/Function   Hearing Difficulty or Deaf no   Wear Glasses or Blind yes   Concentrating, Remembering or Making Decisions Difficulty no   Difficulty Eating/Swallowing no   Walking or Climbing Stairs Difficulty no   Dressing/Bathing Difficulty no   Toileting no   Doing Errands Independently Difficulty (such as shopping) yes   Fall history within last six months yes   Number of times patient has fallen within last six months 1  (states it " "was related to \"blacking out\" from cardiac issues)   Change in Functional Status Since Onset of Current Illness/Injury yes   General Information   Onset of Illness/Injury or Date of Surgery 20   Referring Physician Erwin Chavez   Patient/Family Therapy Goal Statement (OT) \"go home\"   Additional Occupational Profile Info/Pertinent History of Current Problem per  H&P:Monika Bee is a 85 year old female  with Hx of HTN, HLD, Vit D deficiency, Hx PFO closure, Right axilla wounds due to Fungal, 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 She had Hx bicuspid AV and had bioprosthetic aortic valve which developed stenosis. She had a syncopal episode from that. She underwent Mechanical Aortic Valve Placement on 20 by Dr Dr. Jey Rainey. Now transferred for Rehab. Post she had Aflutter which resolved on its own.    Existing Precautions/Restrictions fall;sternal   General Observations and Info OT: pt aware of sternal precautons, able to state them and implement them consistently w/ transfers w/out prompts   Cognitive Status Examination   Orientation Status orientation to person, place and time   Affect/Mental Status (Cognitive) WFL   Follows Commands WNL   Safety Deficit   (pt presents w/ good safety awareness/implements sternal prec)   Cognitive Status Comments OT: overall basic cog appears WFL, no overt concerns. futher assess PRN, pt pleasant and demo good recall of sternal precautons and recent hx   Visual Perception   Visual Acuity wears glasses   Range of Motion Comprehensive   Comment, General Range of Motion OT: appears WFL, did not assess due to sternal precautions   Strength Comprehensive (MMT)   Comment, General Manual Muscle Testing (MMT) Assessment OT: NT due to sternal precautons   Activities of Daily Living   BADL Assessment/Intervention bathing;upper body dressing;lower body dressing;grooming;toileting  (OT: current status sBA BADL/toileting,SBA-CGA transfers)   Bathing Assessment/Intervention "   Comment (Bathing) OT: sba w/ sponge bath UB, min A  LB sponge bath   Upper Body Dressing Assessment/Training   Comment (Upper Body Dressing) OT: sba w/ ub pullover shirt   Lower Body Dressing Assessment/Training   Comment (Lower Body Dressing) OT: Vern w/ LB drg pants, sba w/ sock w/ reacher    Grooming Assessment/Training   Comment (Grooming) OT: sba in standing at sink   Toileting   Comment (Toileting) OT: sba , toilet,walker but not other AE   Instrumental Activities of Daily Living (IADL)   Previous Responsibilities meal prep;housekeeping;laundry;shopping;medication management;finances;driving   Clinical Impression   Criteria for Skilled Therapeutic Interventions Met (OT) yes   OT Diagnosis decreased indep w/ adls/iadls/mobity and new sternal precautions to follow w/ adls   OT Problem List-Impairments impacting ADL activity tolerance impaired;strength;post-surgical precautions   ADL comments/analysis pt overall sba to Vern, could benefit from cont OT skilled services to increase safety/indep w/ adls/iadls/moblity while maintaining sternal preacautions   Assessment of Occupational Performance 3-5 Performance Deficits   Identified Performance Deficits dressing, transfers, bed mobility, toileting, bathing home management   Planned Therapy Interventions (OT) IADL retraining;ADL retraining;bed mobility training;strengthening;transfer training;progressive activity/exercise  (assessment of needed AE for bathing)   Clinical Decision Making Complexity (OT) low complexity   Therapy Frequency (OT) 6x/week   Predicted Duration of Therapy <=1week   Anticipated Equipment Needs Upon Discharge (OT)   (TBD)   Risks and Benefits of Treatment have been explained. Yes   Patient, Family & other staff in agreement with plan of care Yes   Comment-Clinical Impression pt presents w/ new sternal precautions and decreased activity nel/generalized weakness but overall sba to Vern w/ basic adls and mobility, pt could benefit from cont OT  skilled services to increase safety/indep w/ adls/iadls/moblity while maintaining sternal preacautions   OT Discharge Planning    OT Discharge Recommendation (DC Rec) Home with assist;home with home care occupational therapy  (family to assist as needed)   OT Rationale for DC Rec assist w/ bathing potentially and housekeeping, meals, laundry   Therapy Certification   Start of Care Date 10/03/20   Certification date from 10/03/20   Certification date to 11/01/20   Medical Diagnosis bicuspid AV and aortic stenosisi of prior [facility] AV valve, s/p re do sternotomy and new AVR   Total Evaluation Time (Minutes)   Total Evaluation Time (Minutes) 15
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
## 2020-10-03 NOTE — PLAN OF CARE
Patient alert and oriented x 4.Up in the chair during meals.SBA with a walker to bathroom,inependent with kwabena cares.VSS.  Tylenol PRN given x 1 after her therapies,c/o of muscle aches.Denies SOB and CP.Cardiac sternal precautions.        Patient's most recent vital signs are:     Vital signs:  BP: 139/57  Temp: 98.8  HR: 99  RR: 17  SpO2: 96 %     Patient does not have new respiratory symptoms.  Patient does not have new sore throat.  Patient does not have a fever greater than 99.5.
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
## 2020-10-03 NOTE — PLAN OF CARE
Alert and oriented x 4. Good appetite and fluid intake. Surgical incision pain when she cough and during ambulation using a walker. Surgical incision on chest area was CDI and NALINI. Ambulated to the bathroom using a walker with SBA. Voided good no BM.       Patient's most recent vital signs are:     Vital signs:  BP: 155/59  Temp: 97.8  HR: 101  RR: 16  SpO2: 96 %     Patient doesn't  have new respiratory symptoms.  Patient doesn't  have new sore throat.  Patient doesn't  have a fever greater than 99.5.

## 2020-10-03 NOTE — PROGRESS NOTES
"   10/03/20 1033   Living Arrangements   People in home alone   CM/SW Discharge Planning   Patient/Family Anticipates Transition to home   Final Resources   Equipment Currently Used at Home cane, straight;walker, rolling     Social Work: Initial Assessment with Discharge Plan    Patient Name: Monika MCALLISTER \"Joseline\" Cristal  : 1935  Age: 85 year old  Completed assessment with: patient  Admitted to TCU: 10/2/20    Presenting Information   Date of SW assessment: 10/3/20  Health Care Directive: none  Primary Health Care Agent: next-of-kin (sisters) would be surrogate decision-maker if necessary  Secondary Health Care Agent: n/a  Living Situation: lives alone in her house in [place], [place]  Previous Functional Status: independent  DME available: wheeled walker, standard cane  Patient and family understanding of hospitalization: yes  Cultural/Language/Spiritual Considerations: speaks English, Zoroastrianism (/ visits)  Abuse concerns: none  PAS: confirmation number-[id number]  BIMS: score of 15 (cognition intact)  PHQ-9: score of 5 (mild depressive symptoms)    Physical Health  s/p aortic valve replacement in setting of bicuspid aortic valve for severe aortic stenosis    Provider Information   Primary Care Physician: [facility] in [place], [place]    Mental Health:  Diagnosis: none  Current Support/Services: n/a  Previous Services: n/a  Services Needed/Recommended: n/a    Chemical Dependency:   Diagnosis: none; consumes about 6 glasses of wine per week  Current Support/Services: n/a  Previous Services: n/a  Services Needed/Recommended: n/a    Support System  Marital Status: single  Family support: sister-Sowmya \"Pat\" (20 miles away) and sister-Nydia (3 miles away)  Other support available: friend-Belkis  Gaps in support system: none    Community Resources  Current in home services: none  Previous services: none    Financial/Employment/Education  Employment Status: retired; 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  I:  Met with patient to complete assessment and social work consult.  A:  Patient is doing well and motivated.  She is thinking she will probably discharge back home next weekend and prefers to have some home PT before outpatient PT later on.  Her sister, Caty, has been very involved and seeing her daily while at the hospital.  She went back home to [place]. Her friend and former student, Belkis, has offered to drive her back home when she is released.  Supportive family and friends who are involved.    P:  SW will follow and assist as needed.      MARIA E Chambers  Weekend   Acute Rehab Unit Phone: [phone]  Transitional Care Unit Phone: [phone]

## 2020-10-04 LAB — INR PPP: 2.51 (ref 0.86–1.14)

## 2020-10-04 PROCEDURE — 120N000009 HC R&B SNF

## 2020-10-04 PROCEDURE — 250N000013 HC RX MED GY IP 250 OP 250 PS 637

## 2020-10-04 PROCEDURE — 250N000013 HC RX MED GY IP 250 OP 250 PS 637: Performed by: HOSPITALIST

## 2020-10-04 PROCEDURE — 85610 PROTHROMBIN TIME: CPT | Performed by: HOSPITALIST

## 2020-10-04 PROCEDURE — 36415 COLL VENOUS BLD VENIPUNCTURE: CPT | Performed by: HOSPITALIST

## 2020-10-04 RX ORDER — WARFARIN SODIUM 3 MG/1
3 TABLET ORAL
Status: COMPLETED | OUTPATIENT
Start: 2020-10-04 | End: 2020-10-04

## 2020-10-04 RX ADMIN — CALCIUM CARBONATE 600 MG (1,500 MG)-VITAMIN D3 400 UNIT TABLET 2 TABLET: at 08:07

## 2020-10-04 RX ADMIN — WARFARIN SODIUM 3 MG: 3 TABLET ORAL at 18:11

## 2020-10-04 RX ADMIN — MULTIPLE VITAMINS W/ MINERALS TAB 1 TABLET: TAB at 08:08

## 2020-10-04 RX ADMIN — POTASSIUM CHLORIDE 10 MEQ: 750 TABLET, EXTENDED RELEASE ORAL at 08:08

## 2020-10-04 RX ADMIN — LATANOPROST 1 DROP: 50 SOLUTION OPHTHALMIC at 21:40

## 2020-10-04 RX ADMIN — DORZOLAMIDE HYDROCHLORIDE AND TIMOLOL MALEATE 1 DROP: 20; 5 SOLUTION/ DROPS OPHTHALMIC at 08:08

## 2020-10-04 RX ADMIN — ASPIRIN 81 MG CHEWABLE TABLET 81 MG: 81 TABLET CHEWABLE at 08:07

## 2020-10-04 RX ADMIN — METOPROLOL TARTRATE 50 MG: 25 TABLET, FILM COATED ORAL at 21:40

## 2020-10-04 RX ADMIN — METHYLCELLULOSE 500 MG: 500 TABLET ORAL at 08:08

## 2020-10-04 RX ADMIN — DORZOLAMIDE HYDROCHLORIDE AND TIMOLOL MALEATE 1 DROP: 20; 5 SOLUTION/ DROPS OPHTHALMIC at 21:40

## 2020-10-04 RX ADMIN — PANTOPRAZOLE SODIUM 40 MG: 40 TABLET, DELAYED RELEASE ORAL at 06:42

## 2020-10-04 RX ADMIN — FUROSEMIDE 20 MG: 20 TABLET ORAL at 08:07

## 2020-10-04 RX ADMIN — SIMVASTATIN 20 MG: 20 TABLET, FILM COATED ORAL at 21:40

## 2020-10-04 RX ADMIN — METOPROLOL TARTRATE 50 MG: 25 TABLET, FILM COATED ORAL at 08:08

## 2020-10-04 ASSESSMENT — MIFFLIN-ST. JEOR: SCORE: 1178.95

## 2020-10-04 NOTE — PLAN OF CARE
RN: Pt denies need for PRN pain med or other pain  Intervention so far tonight.. Mid-chest incision is well approximated- NALINI. L upper abd has dry shadow of serosanguinous drainage on the dressing and chest has dressings CDI. Blister on R breast fold erupted and is now covered with Mepilex dressing. Independent with bed mobility.  Able to maintain Sternal prec by hugging self when getting out of bed.  SBA to BR with walker and gait belt. A of 1 to lift both legs back to bed.Noted to be doing pursed- lip breathing with activity. Able to do own kwabena care. Appear to be sleeping/resting between cares. Using call appropriately. Continue with plan of care.    Patient's most recent vital signs are:  Vital signs:  BP: 143/57  Temp: 96.7  HR: 100  RR: 18  SpO2: 97 %     Patient does not have new respiratory symptoms.  Patient does not have new sore throat.  Patient does not have a fever greater than 99.5.

## 2020-10-04 NOTE — PROGRESS NOTES
Pt is A&Ox4. She denied pain this shift. Pt is SBA with walker to BR and transfers but needs AO1 for lifting legs into bed. Pt has good appetite. Pt is compliant with medications.         Patient's most recent vital signs are:     Vital signs:  BP: 152/59  Temp: 97.5  HR: 93  RR: 19  SpO2: 95 %     Patient does not have new respiratory symptoms.  Patient does not have new sore throat.  Patient does not have a fever greater than 99.5.

## 2020-10-05 ENCOUNTER — APPOINTMENT (OUTPATIENT)
Dept: OCCUPATIONAL THERAPY | Facility: SKILLED NURSING FACILITY | Age: 85
End: 2020-10-05
Attending: HOSPITALIST
Payer: COMMERCIAL

## 2020-10-05 ENCOUNTER — APPOINTMENT (OUTPATIENT)
Dept: PHYSICAL THERAPY | Facility: SKILLED NURSING FACILITY | Age: 85
End: 2020-10-05
Payer: COMMERCIAL

## 2020-10-05 LAB
ALBUMIN UR-MCNC: NEGATIVE MG/DL
ANION GAP SERPL CALCULATED.3IONS-SCNC: 5 MMOL/L (ref 3–14)
APPEARANCE UR: CLEAR
BACTERIA #/AREA URNS HPF: ABNORMAL /HPF
BILIRUB UR QL STRIP: NEGATIVE
BUN SERPL-MCNC: 14 MG/DL (ref 7–30)
CALCIUM SERPL-MCNC: 8.1 MG/DL (ref 8.5–10.1)
CHLORIDE SERPL-SCNC: 106 MMOL/L (ref 94–109)
CO2 SERPL-SCNC: 28 MMOL/L (ref 20–32)
COLOR UR AUTO: ABNORMAL
CREAT SERPL-MCNC: 0.99 MG/DL (ref 0.52–1.04)
ERYTHROCYTE [DISTWIDTH] IN BLOOD BY AUTOMATED COUNT: 13.3 % (ref 10–15)
GFR SERPL CREATININE-BSD FRML MDRD: 53 ML/MIN/{1.73_M2}
GLUCOSE SERPL-MCNC: 103 MG/DL (ref 70–99)
GLUCOSE UR STRIP-MCNC: NEGATIVE MG/DL
HCT VFR BLD AUTO: 28.3 % (ref 35–47)
HGB BLD-MCNC: 8.7 G/DL (ref 11.7–15.7)
HGB UR QL STRIP: NEGATIVE
INR PPP: 2.44 (ref 0.86–1.14)
KETONES UR STRIP-MCNC: NEGATIVE MG/DL
LEUKOCYTE ESTERASE UR QL STRIP: ABNORMAL
MCH RBC QN AUTO: 31.8 PG (ref 26.5–33)
MCHC RBC AUTO-ENTMCNC: 30.7 G/DL (ref 31.5–36.5)
MCV RBC AUTO: 103 FL (ref 78–100)
MUCOUS THREADS #/AREA URNS LPF: PRESENT /LPF
NITRATE UR QL: NEGATIVE
PH UR STRIP: 5 PH (ref 5–7)
PLATELET # BLD AUTO: 591 10E9/L (ref 150–450)
POTASSIUM SERPL-SCNC: 4 MMOL/L (ref 3.4–5.3)
RBC # BLD AUTO: 2.74 10E12/L (ref 3.8–5.2)
RBC #/AREA URNS AUTO: 1 /HPF (ref 0–2)
SODIUM SERPL-SCNC: 139 MMOL/L (ref 133–144)
SOURCE: ABNORMAL
SP GR UR STRIP: 1.01 (ref 1–1.03)
SQUAMOUS #/AREA URNS AUTO: <1 /HPF (ref 0–1)
UROBILINOGEN UR STRIP-MCNC: NORMAL MG/DL (ref 0–2)
WBC # BLD AUTO: 7.9 10E9/L (ref 4–11)
WBC #/AREA URNS AUTO: 22 /HPF (ref 0–5)

## 2020-10-05 PROCEDURE — 250N000013 HC RX MED GY IP 250 OP 250 PS 637: Performed by: HOSPITALIST

## 2020-10-05 PROCEDURE — 87088 URINE BACTERIA CULTURE: CPT | Performed by: HOSPITALIST

## 2020-10-05 PROCEDURE — 85027 COMPLETE CBC AUTOMATED: CPT | Performed by: HOSPITALIST

## 2020-10-05 PROCEDURE — 80048 BASIC METABOLIC PNL TOTAL CA: CPT | Performed by: HOSPITALIST

## 2020-10-05 PROCEDURE — 120N000009 HC R&B SNF

## 2020-10-05 PROCEDURE — 87086 URINE CULTURE/COLONY COUNT: CPT | Performed by: HOSPITALIST

## 2020-10-05 PROCEDURE — 85610 PROTHROMBIN TIME: CPT | Performed by: HOSPITALIST

## 2020-10-05 PROCEDURE — 87186 SC STD MICRODIL/AGAR DIL: CPT | Performed by: HOSPITALIST

## 2020-10-05 PROCEDURE — 97110 THERAPEUTIC EXERCISES: CPT | Mod: GP

## 2020-10-05 PROCEDURE — 97110 THERAPEUTIC EXERCISES: CPT | Mod: GO

## 2020-10-05 PROCEDURE — 81001 URINALYSIS AUTO W/SCOPE: CPT | Performed by: HOSPITALIST

## 2020-10-05 PROCEDURE — 36415 COLL VENOUS BLD VENIPUNCTURE: CPT | Performed by: HOSPITALIST

## 2020-10-05 RX ORDER — WARFARIN SODIUM 3 MG/1
3 TABLET ORAL
Status: COMPLETED | OUTPATIENT
Start: 2020-10-05 | End: 2020-10-05

## 2020-10-05 RX ORDER — FUROSEMIDE 20 MG
20 TABLET ORAL
Status: COMPLETED | OUTPATIENT
Start: 2020-10-05 | End: 2020-10-06

## 2020-10-05 RX ORDER — MUPIROCIN 20 MG/G
OINTMENT TOPICAL 2 TIMES DAILY
Status: DISCONTINUED | OUTPATIENT
Start: 2020-10-05 | End: 2020-10-09 | Stop reason: HOSPADM

## 2020-10-05 RX ADMIN — DORZOLAMIDE HYDROCHLORIDE AND TIMOLOL MALEATE 1 DROP: 20; 5 SOLUTION/ DROPS OPHTHALMIC at 21:26

## 2020-10-05 RX ADMIN — ASPIRIN 81 MG CHEWABLE TABLET 81 MG: 81 TABLET CHEWABLE at 09:39

## 2020-10-05 RX ADMIN — FUROSEMIDE 20 MG: 20 TABLET ORAL at 09:39

## 2020-10-05 RX ADMIN — WARFARIN SODIUM 3 MG: 3 TABLET ORAL at 17:59

## 2020-10-05 RX ADMIN — LATANOPROST 1 DROP: 50 SOLUTION OPHTHALMIC at 21:26

## 2020-10-05 RX ADMIN — DORZOLAMIDE HYDROCHLORIDE AND TIMOLOL MALEATE 1 DROP: 20; 5 SOLUTION/ DROPS OPHTHALMIC at 09:35

## 2020-10-05 RX ADMIN — POTASSIUM CHLORIDE 10 MEQ: 750 TABLET, EXTENDED RELEASE ORAL at 09:37

## 2020-10-05 RX ADMIN — FUROSEMIDE 20 MG: 20 TABLET ORAL at 16:26

## 2020-10-05 RX ADMIN — METOPROLOL TARTRATE 50 MG: 25 TABLET, FILM COATED ORAL at 09:44

## 2020-10-05 RX ADMIN — PANTOPRAZOLE SODIUM 40 MG: 40 TABLET, DELAYED RELEASE ORAL at 05:57

## 2020-10-05 RX ADMIN — METHYLCELLULOSE 500 MG: 500 TABLET ORAL at 09:38

## 2020-10-05 RX ADMIN — SIMVASTATIN 20 MG: 20 TABLET, FILM COATED ORAL at 21:26

## 2020-10-05 RX ADMIN — MULTIPLE VITAMINS W/ MINERALS TAB 1 TABLET: TAB at 09:38

## 2020-10-05 RX ADMIN — CALCIUM CARBONATE 600 MG (1,500 MG)-VITAMIN D3 400 UNIT TABLET 2 TABLET: at 09:36

## 2020-10-05 RX ADMIN — METOPROLOL TARTRATE 50 MG: 25 TABLET, FILM COATED ORAL at 21:26

## 2020-10-05 ASSESSMENT — MIFFLIN-ST. JEOR: SCORE: 1181.67

## 2020-10-05 NOTE — PROGRESS NOTES
Care plan printed and given to pt at bedside. Pt expressed how thankful she was that the TCU staff celebrated her birthday on the day of her admissions. Pt discussed her upcoming discharge and stated that she has support from her family. Denied immediate SW needs at this time.     ROBBI Tidwell, [facility] Acute Rehab Unit   Phone: [phone]  I   Pager: [phone]

## 2020-10-05 NOTE — PLAN OF CARE
PT: Pt continues to be limited by fatigue however shows good participation and willingness to challenge herself in session. Pt cleared to ambulate IND in hallways with FWW for added stability - wrote on whiteboard, pt is aware and in agreeance with plan.
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
## 2020-10-05 NOTE — PROGRESS NOTES
[facility]  [facility], [place], [place]  Internal Medicine Progress Note      Assessment and Plans:         Monika Bee is a 85 year old female who was admitted on 10/2/2020.       Monika Bee is a 85 year old female  with Hx of HTN, HLD, Vit D deficiency, Hx PFO closure, Right axilla wounds due to Fungal,  cleft wounds due to Intertrigo. She had Hx bicuspid AV and had bioprosthetic aortic valve which developed stenosis. She had a syncopal episode from that. She underwent Mechanical Aortic Valve Placement on 20 by Dr Dr. Jey Rainey. Now transferred for Rehab. Post she had Aflutter which resolved on its own.      # S/P redo sternotomy and new mechanical AVR on 20 by Dr Jey Rainey  # Prior Hx of bicuspid AV and aortic stenosis of prior bioprosthetic AV valve  # Chronic anticoagulation for [place] AVR. INR goal 2-3  # Prior Hx of PFO closure  # Deconditioning from above procedure  -she is doing well.   -sternal precautions  -PT, OT  -Ct ASA 81 mg every day, lasix 20 mg every day, metoprolol 50 mg BID, Kdur 10 mg every day, Simvastatin 20 mg every day, warfarin (goal 2-3)  -Follow-up with thoracic Sx as planned  -One of the abdominal wound has whitish yellowish margin and some drainage to it. Start topical Ab for the trochar sites. The sternotomy wound in the mid chest looks fine.     # Post op. Paroxysmal Atrial Flutter, variable AV block..   -Coverted to Sinus on its own. Cardioversion aborted.   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 Creat peak at 1.15. Lasix cut back to 20 mg every day, ct that. Monitor.   # HT: BP is controlled. Current BP is 149/52. Dropped on 130/50 on standing. Monitor for worsening erythrostasis.   # Dyslipidemia: ct Zocor  # Vid D Deficiency: Ct on Ca and Vit D supplements.   # Urinary Urgency and Frequency: Check UA  # BL leg edema: Lasix was cut back  to 20 mg every day for JAZZMINE. Give additional 20 mg lasix for two days. Creat is back to normal. She has leg edema. Consult lymphedema team for compression.      Antipsychotics: None  Pneuocomococcal Vaccine:  Uptodate     Pneumo-conj  07/29/2010    Prevnar 13        Yes    Pneumo-conj  08/10/2016    Prevnar 13  Full      No    Pneumo-poly  11/18/2010  1 of 1    Full      No       DVT Prophylaxis: Warfarin  Code Status: Full Code     Disposition: TBD.     MD Erwin Quevedo MD  Text Page  (7am - 5pm, M-F)    Subjective:     Overnight Events reviewed, Chart Reviewed  Had some frequency and urgency this morning.   No fevers.   No chest pain or sob.   Has some leg edema.        -Data reviewed today: I reviewed all new labs and imaging results over the last 24 hours.     Exam:         Temp: 98.2  F (36.8  C) Temp src: Oral BP: 139/63 Pulse: 100   Resp: 20 SpO2: 95 % O2 Device: None (Room air)    Vitals:    10/03/20 2051 10/04/20 0620 10/05/20 0601   Weight: 82.4 kg (181 lb 11.2 oz) 81.1 kg (178 lb 11.2 oz) 81.3 kg (179 lb 4.8 oz)     Vital Signs with Ranges  Temp:  [98.2  F (36.8  C)-98.3  F (36.8  C)] 98.2  F (36.8  C)  Pulse:  [] 100  Resp:  [20] 20  BP: (139-163)/(52-63) 139/63  SpO2:  [95 %-96 %] 95 %  I/O last 3 completed shifts:  In: 360 [P.O.:360]  Out: -     Constitutional: No distress noted, Alert, Answering questions appropriately  Respiratory: AE is goog on both sides, no wheezing onr crackles  Cardiovascular: S1S2 normal. Mechanical aortic valve noted.   Chest: Sternotomy wound healing well. The abdominal wounds, one of them has some whitish yellowish drainage.  GI: Soft, non tender  Skin/Integumen: No rash. BL leg edema noted. No calf tenderness.       Medications     - MEDICATION INSTRUCTIONS -       - MEDICATION INSTRUCTIONS -       Warfarin Therapy Reminder         - Skin Test Reading (tuberculin) -   Does not apply Q21 Days     aspirin  81 mg Oral Daily     calcium  carbonate 600 mg-vitamin D 400 units  2 tablet Oral Daily     dorzolamide-timolol  1 drop Both Eyes BID     furosemide  20 mg Oral Daily     latanoprost  1 drop Both Eyes At Bedtime     methylcellulose  500 mg Oral Daily     metoprolol tartrate  50 mg Oral BID     multivitamin w/minerals  1 tablet Oral Daily     pantoprazole  40 mg Oral QAM AC     potassium chloride ER  10 mEq Oral Daily     simvastatin  20 mg Oral At Bedtime     tuberculin  5 Units Intradermal Q21 Days     warfarin ANTICOAGULANT  3 mg Oral ONCE at 18:00     Data   Recent Labs   Lab 10/05/20  0543 10/04/20  0614 10/03/20  0744 10/02/20  0532 10/01/20  0919 10/01/20  0555   WBC 7.9  --   --  7.5  --  6.2   HGB 8.7*  --   --  8.7*  --  8.6*   *  --   --  105*  --  107*   *  --   --  490*  --  394   INR 2.44* 2.51* 2.60* 2.64* 2.51* 2.77*     --   --  140  --  142   POTASSIUM 4.0  --   --  4.2 4.4 4.7   CHLORIDE 106  --   --  107  --  110*   CO2 28  --   --  28  --  27   BUN 14  --   --  28  --  24   CR 0.99  --   --  1.15*  --  1.14*   ANIONGAP 5  --   --  4  --  5   RYANN 8.1*  --   --  8.5  --  8.4*   *  --   --  99  --  97       No results found for this or any previous visit (from the past 24 hour(s)).

## 2020-10-05 NOTE — PLAN OF CARE
Precautions: sternal  OT: cardio calisthenitc exer modified for sternal precautions in sititng/standing x1 min each, nel well, handout provided, minimal rest break between exer    Dolly Arguello, OTR/L, CBIS

## 2020-10-05 NOTE — PLAN OF CARE
Pt is alert and oriented x 4.  Very pleasant pt. VSS.No complaints of chest pain, shortness of breath, or other pain. Chest incision NALINI, puncture sites covered with primapore and bacitracin. Had urgency and frequency voiding this morning, urine specimen collected this shift. Doing well with therapy. Call light within reach.      Patient's most recent vital signs are:     Vital signs:  BP: 139/63  Temp: 98.2  HR: 100  RR: 20  SpO2: 95 %     Patient does not have new respiratory symptoms.  Patient does not have new sore throat.  Patient does not have a fever greater than 99.5.

## 2020-10-05 NOTE — PLAN OF CARE
RN: Pt denies need for PRN pain med during encounters.. Mid-sternal incision with liqwuid bandage NALINI. Several gauze  (x3) dressings close to incision from old  drain sites. R breat fold with Mepilex dressing CDI. IIndependent with bed mobility.  Able to maintain Sternal prec by placing hand across  her chest when getting out of bed.  SBA to BR with walker and gait belt. A of 1 to lift both legs back to bed.Noted to be doing pursed- lip breathing with activity. Able to do own kwabena care. Appear to be sleeping/resting between cares. Using call appropriately. Continue with plan of care.    Patient's most recent vital signs are:     Vital signs:  BP: 144/63  Temp: 98.3  HR: 97  RR: 20  SpO2: 96 %     Patient does not have new respiratory symptoms.  Patient does not have new sore throat.  Patient does not have a fever greater than 99.5.

## 2020-10-06 ENCOUNTER — CARE COORDINATION (OUTPATIENT)
Dept: CARDIOLOGY | Facility: CLINIC | Age: 85
End: 2020-10-06

## 2020-10-06 ENCOUNTER — APPOINTMENT (OUTPATIENT)
Dept: PHYSICAL THERAPY | Facility: SKILLED NURSING FACILITY | Age: 85
End: 2020-10-06
Payer: COMMERCIAL

## 2020-10-06 ENCOUNTER — APPOINTMENT (OUTPATIENT)
Dept: OCCUPATIONAL THERAPY | Facility: SKILLED NURSING FACILITY | Age: 85
End: 2020-10-06
Payer: COMMERCIAL

## 2020-10-06 LAB
BACTERIA SPEC CULT: ABNORMAL
BACTERIA SPEC CULT: ABNORMAL
INR PPP: 2.22 (ref 0.86–1.14)
Lab: ABNORMAL
SPECIMEN SOURCE: ABNORMAL

## 2020-10-06 PROCEDURE — 250N000013 HC RX MED GY IP 250 OP 250 PS 637

## 2020-10-06 PROCEDURE — 250N000009 HC RX 250: Performed by: HOSPITALIST

## 2020-10-06 PROCEDURE — 250N000013 HC RX MED GY IP 250 OP 250 PS 637: Performed by: HOSPITALIST

## 2020-10-06 PROCEDURE — 120N000009 HC R&B SNF

## 2020-10-06 PROCEDURE — 36415 COLL VENOUS BLD VENIPUNCTURE: CPT | Performed by: HOSPITALIST

## 2020-10-06 PROCEDURE — 97110 THERAPEUTIC EXERCISES: CPT | Mod: GP

## 2020-10-06 PROCEDURE — 97535 SELF CARE MNGMENT TRAINING: CPT | Mod: GO

## 2020-10-06 PROCEDURE — 85610 PROTHROMBIN TIME: CPT | Performed by: HOSPITALIST

## 2020-10-06 RX ORDER — WARFARIN SODIUM 4 MG/1
4 TABLET ORAL
Status: COMPLETED | OUTPATIENT
Start: 2020-10-06 | End: 2020-10-06

## 2020-10-06 RX ADMIN — ASPIRIN 81 MG CHEWABLE TABLET 81 MG: 81 TABLET CHEWABLE at 09:00

## 2020-10-06 RX ADMIN — METOPROLOL TARTRATE 50 MG: 25 TABLET, FILM COATED ORAL at 21:24

## 2020-10-06 RX ADMIN — MULTIPLE VITAMINS W/ MINERALS TAB 1 TABLET: TAB at 09:00

## 2020-10-06 RX ADMIN — PANTOPRAZOLE SODIUM 40 MG: 40 TABLET, DELAYED RELEASE ORAL at 06:09

## 2020-10-06 RX ADMIN — METOPROLOL TARTRATE 50 MG: 25 TABLET, FILM COATED ORAL at 09:00

## 2020-10-06 RX ADMIN — DORZOLAMIDE HYDROCHLORIDE AND TIMOLOL MALEATE 1 DROP: 20; 5 SOLUTION/ DROPS OPHTHALMIC at 09:00

## 2020-10-06 RX ADMIN — FUROSEMIDE 20 MG: 20 TABLET ORAL at 17:29

## 2020-10-06 RX ADMIN — SIMVASTATIN 20 MG: 20 TABLET, FILM COATED ORAL at 21:24

## 2020-10-06 RX ADMIN — METHYLCELLULOSE 500 MG: 500 TABLET ORAL at 09:00

## 2020-10-06 RX ADMIN — DORZOLAMIDE HYDROCHLORIDE AND TIMOLOL MALEATE 1 DROP: 20; 5 SOLUTION/ DROPS OPHTHALMIC at 21:22

## 2020-10-06 RX ADMIN — MUPIROCIN: 20 OINTMENT TOPICAL at 11:41

## 2020-10-06 RX ADMIN — LATANOPROST 1 DROP: 50 SOLUTION OPHTHALMIC at 21:24

## 2020-10-06 RX ADMIN — POTASSIUM CHLORIDE 10 MEQ: 750 TABLET, EXTENDED RELEASE ORAL at 09:00

## 2020-10-06 RX ADMIN — FUROSEMIDE 20 MG: 20 TABLET ORAL at 09:00

## 2020-10-06 RX ADMIN — WARFARIN SODIUM 4 MG: 4 TABLET ORAL at 17:29

## 2020-10-06 RX ADMIN — CALCIUM CARBONATE 600 MG (1,500 MG)-VITAMIN D3 400 UNIT TABLET 2 TABLET: at 09:00

## 2020-10-06 ASSESSMENT — MIFFLIN-ST. JEOR: SCORE: 1176.23

## 2020-10-06 NOTE — PROGRESS NOTES
HOW ARE YOU DOING  Tell me how you have been doing since you were discharged from the hospital?  Patient states she is doing fine, she feels she will have more energy and can breath better.     ACTIVITY  How is your activity tolerance? Good, getting physical and occupational therapy at the TCU.  Patient will be going to [facility] in [place] for cardiac rehab after discharge.     POST OP MONITORING  How is your pain on a 0-10 scale, how are you managing your pain? No pain, some sore shoulders and if she leans on her walker to hard it will hurt.      Are you still doing sternal precautions? Yes    Do you hear any clicking when you are moving or taking a deep breath?  No    Are you weighing yourself daily?  Facility staff is monitoring.     Are you using the inspirometer? No, instructed patient to use this to prevent pneumonia as she states she still has a productive cough.       SIGNS AND SYMPTOMS OF INFECTION  1. INCREASE IN PAIN No  2. FEVER no  3. DRAINAGE no    If Yes, color:                 5. REDNESS no    6. SWELLING no    Incision on sternum C/D/I one chest tube site is still draining a yellowish drainage.      ASSISTANCE  Do you have someone at home to assist you with your daily activities?  Facility staff      MEDICATIONS  Is someone helping you to set up your medications?  Facility Staff.   Do you have any questions about your medications?  No, patient will have assistance of her sister and a family friend when she gets back home.      Are you on a blood thinner?  Yes  Who is managing your INRs?  Facility     FOLLOW UP  Are you scheduled for cardiac rehab? Will set up at home in [place].       You are scheduled to see our surgery advanced practice provider for post operative follow up on 10/09/20 via phone.   You are scheduled to see your cardiologist on, will see when she gets home.   You are scheduled to see your primary care physician on, N/A patient is being followed by facility MD.        CONTACT  INFORMATION  Please feel free to call us with any other questions or symptoms that are concerning for you at [phone], if it is after 4:30 in the afternoon, or a weekend please call [phone] and ask for the on call specialist.  We want to do everything we can to help prevent you needing to return to the ED, so please do not hesitate to call us.

## 2020-10-06 NOTE — PROGRESS NOTES
10/06/20 1100   General Information   Patient Profile Review See Profile for full history and prior level of function   Daily Contact with Relatives or Friends Phone call   Pets Dog  (loves animals, sisters have Schitzus)   Observations   (Alert, bright, motivated and independent in leisure)   Community Involvement   Community Involvement Retired  ()   Outings Dinner;Movies   Hobbies/Interests   Word Puzzles Crossword   Music   Music Preferences Rock  (loves 40's love songs/ballads)   Listens to Recorded Music Yes   Media   Newspapers Daily   TV / Movies TV   Reading Books;Has own reading materials   Reading Preferences Fiction;Mystery   Sports / Physical Activities   Outdoor Activities Lawn / yard care;Indoor gardening;Outdoor gardening   Sports/Exercise Golf;Swim;Bike;Ski;Basketball;Walks  (loves to be active)   Sports Fan Football   Impression   Open to Socializing with Others Independent   Treatment Plan   Interested in Unit [place]? No   Type of Intervention Independent with activity   TR met with patient to discuss leisure needs and orient to TR program.  Patient friendly and motivated.  Has variety of leisure interests and is independent. Likes to be called Joseline.  No leisure needs at this time.  Will see weekly for socialization and leisure check in.

## 2020-10-06 NOTE — PLAN OF CARE
RN: Pt denies need for PRN pain med during encounters.. Mid-sternal incision with liquid bandage NALINI. Gauze dressings on old drain sites, CDI. Independent with bed mobility. Able to maintain Sternal prec by placing hand across  her chest when getting out of bed.  SBA to BR with walker and gait belt. Pt thinks she is doing better in terms issue with urgency and A of 1 to lift both legs back to bed.Noted to be doing pursed- lip breathing with activity. Able to do own kwabena care. Appear to be sleeping/resting between cares. Using call appropriately. Continue with plan of care.     Patient's most recent vital signs are:     Vital signs:  BP: 142/42  Temp: 97.8  HR: 104  RR: 18  SpO2: 97 %     Patient does not have new respiratory symptoms.  Patient does not have new sore throat.  Patient does not have a fever greater than 99.5.

## 2020-10-06 NOTE — PLAN OF CARE
PT: Pt continues to show good participation and progress in therapy, limited by fatigue and poor endurance. Pt explains that observing sternal precautions with bed mobility is largest discharge concern, should remain a focus on therapies.   Continue to challenge pt's endurance in session and encourage pt to ambulate out of room IND outside of therapy sessions using FWW.

## 2020-10-06 NOTE — PLAN OF CARE
Precautions: sternal  OT: pt stated biggest concern was getting in and out of bed w/ sternal precautions, OT/pt spend signitificant amout of time discussing d/c setup and recommendations and problem sovling/practicing sit to supine and supine to sit transfers in bed, pt demo sit to supine w/ sba, supine to sit w/ min A, pt/th called pt's sister per pt and sister's request for update on d/c planning and recommendations. pt to get outpt PT and no OT at d/c , pt's sister Pat stated she and other sister avail to assist w/ transport to get pt to/from OP therapy, pt's friend Magdalena Pride plans to drive pt home to [place] at time of d/ c from TCU. pt's sister Pat requested call from social work or DON (Betty) to confirm set up of Outpt therapy . This Th provided direct handoff of info to Betty.     Dolly Arguello, OTR/L, CBIS

## 2020-10-06 NOTE — PLAN OF CARE
Patient's most recent vital signs are:     Vital signs:  BP: 146/62  Temp: 97.1  HR: 96  RR: 16  SpO2: 95 %     Patient does not have new respiratory symptoms.  Patient does not have new sore throat.  Patient does not have a fever greater than 99.5.       RN: Pt plans to discharge to home leaving at 10 am Friday, has 4 hour drive.

## 2020-10-06 NOTE — PLAN OF CARE
Pt alert and oriented x4. VSS. No complaints of shortness of breath or chest pain. Denies pain. Up with SBA and walker to the bathroom. Regular diet, thin liquids. Takes pills whole. Continent of bowel and bladder. LBM: 10/4. Has urgency and frequency. Pt is on lasix x2. Creatinine good. Sternal incisions had a little yellow drainage. Sternal precautions. No lines or drains. Pt able to make needs known. Will continue with plan of care.     Pt refused shower this evening. Stated she was too tired.       Patient's most recent vital signs are:      Vital signs:  BP: 142/42  Temp: 97.8  HR: 104  RR: 18  SpO2: 97%     Patient does not have new respiratory symptoms.  Patient does not have new sore throat.  Patient does not have a fever greater than 99.5.

## 2020-10-07 ENCOUNTER — APPOINTMENT (OUTPATIENT)
Dept: OCCUPATIONAL THERAPY | Facility: SKILLED NURSING FACILITY | Age: 85
End: 2020-10-07
Attending: HOSPITALIST
Payer: COMMERCIAL

## 2020-10-07 ENCOUNTER — APPOINTMENT (OUTPATIENT)
Dept: PHYSICAL THERAPY | Facility: SKILLED NURSING FACILITY | Age: 85
End: 2020-10-07
Payer: COMMERCIAL

## 2020-10-07 LAB — INR PPP: 2.3 (ref 0.86–1.14)

## 2020-10-07 PROCEDURE — 97535 SELF CARE MNGMENT TRAINING: CPT | Mod: GO

## 2020-10-07 PROCEDURE — 97110 THERAPEUTIC EXERCISES: CPT | Mod: GO

## 2020-10-07 PROCEDURE — 97110 THERAPEUTIC EXERCISES: CPT | Mod: GP | Performed by: PHYSICAL THERAPIST

## 2020-10-07 PROCEDURE — 36415 COLL VENOUS BLD VENIPUNCTURE: CPT | Performed by: HOSPITALIST

## 2020-10-07 PROCEDURE — 250N000013 HC RX MED GY IP 250 OP 250 PS 637

## 2020-10-07 PROCEDURE — 85610 PROTHROMBIN TIME: CPT | Performed by: HOSPITALIST

## 2020-10-07 PROCEDURE — 120N000009 HC R&B SNF

## 2020-10-07 PROCEDURE — 99308 SBSQ NF CARE LOW MDM 20: CPT | Performed by: INTERNAL MEDICINE

## 2020-10-07 PROCEDURE — 250N000013 HC RX MED GY IP 250 OP 250 PS 637: Performed by: HOSPITALIST

## 2020-10-07 PROCEDURE — 97530 THERAPEUTIC ACTIVITIES: CPT | Mod: GP | Performed by: PHYSICAL THERAPIST

## 2020-10-07 PROCEDURE — 250N000013 HC RX MED GY IP 250 OP 250 PS 637: Performed by: INTERNAL MEDICINE

## 2020-10-07 RX ORDER — WARFARIN SODIUM 4 MG/1
4 TABLET ORAL
Status: COMPLETED | OUTPATIENT
Start: 2020-10-07 | End: 2020-10-07

## 2020-10-07 RX ADMIN — MUPIROCIN: 20 OINTMENT TOPICAL at 21:21

## 2020-10-07 RX ADMIN — MULTIPLE VITAMINS W/ MINERALS TAB 1 TABLET: TAB at 09:10

## 2020-10-07 RX ADMIN — LATANOPROST 1 DROP: 50 SOLUTION OPHTHALMIC at 21:23

## 2020-10-07 RX ADMIN — DORZOLAMIDE HYDROCHLORIDE AND TIMOLOL MALEATE 1 DROP: 20; 5 SOLUTION/ DROPS OPHTHALMIC at 09:10

## 2020-10-07 RX ADMIN — SIMVASTATIN 20 MG: 20 TABLET, FILM COATED ORAL at 21:22

## 2020-10-07 RX ADMIN — WARFARIN SODIUM 4 MG: 4 TABLET ORAL at 18:23

## 2020-10-07 RX ADMIN — POTASSIUM CHLORIDE 10 MEQ: 750 TABLET, EXTENDED RELEASE ORAL at 09:10

## 2020-10-07 RX ADMIN — PANTOPRAZOLE SODIUM 40 MG: 40 TABLET, DELAYED RELEASE ORAL at 07:24

## 2020-10-07 RX ADMIN — METOPROLOL TARTRATE 50 MG: 25 TABLET, FILM COATED ORAL at 09:10

## 2020-10-07 RX ADMIN — ASPIRIN 81 MG CHEWABLE TABLET 81 MG: 81 TABLET CHEWABLE at 09:10

## 2020-10-07 RX ADMIN — METHYLCELLULOSE 500 MG: 500 TABLET ORAL at 09:10

## 2020-10-07 RX ADMIN — DORZOLAMIDE HYDROCHLORIDE AND TIMOLOL MALEATE 1 DROP: 20; 5 SOLUTION/ DROPS OPHTHALMIC at 21:19

## 2020-10-07 RX ADMIN — FUROSEMIDE 20 MG: 20 TABLET ORAL at 09:10

## 2020-10-07 RX ADMIN — MUPIROCIN: 20 OINTMENT TOPICAL at 09:10

## 2020-10-07 RX ADMIN — METOPROLOL TARTRATE 50 MG: 25 TABLET, FILM COATED ORAL at 21:19

## 2020-10-07 RX ADMIN — CEPHALEXIN 250 MG: 250 CAPSULE ORAL at 21:20

## 2020-10-07 RX ADMIN — CEPHALEXIN 250 MG: 250 CAPSULE ORAL at 13:11

## 2020-10-07 RX ADMIN — CALCIUM CARBONATE 600 MG (1,500 MG)-VITAMIN D3 400 UNIT TABLET 2 TABLET: at 09:10

## 2020-10-07 ASSESSMENT — MIFFLIN-ST. JEOR: SCORE: 1173.5

## 2020-10-07 NOTE — PLAN OF CARE
Patient appears sleeping, resting in bed during rounds. No complaint of pain and discomfort. No respiratory distress noted. Will continue with current plan of care.          Patient's most recent vital signs are:     Vital signs:  BP: 133/52  Temp: 98  HR: 110  RR: 18  SpO2: 96 %     Patient does not have new respiratory symptoms.  Patient does not have new sore throat.  Patient does not have a fever greater than 99.5.

## 2020-10-07 NOTE — PLAN OF CARE
OT: Pt on track for discharge.  Pt distant supervision for functional mobility in kitchen with no AD.  Pt issued size F spandigrip by PT earlier today; reviewed wear schedule (23/24 hours), skin care routine, and OP therapy prn.

## 2020-10-07 NOTE — PLAN OF CARE
Pt A&O x4. VSS on RA. Denies pain, chest pain, SOB, and nausea.  Dyspnea on exertion, better with rest. Started on PO keflex this shift for UTI. New medication education given. Pt verbalized understanding. Chest wounds cleaned and new dressings and medicated ointment applied. Independent. Continent of B&B. Call light is in reach. Will continue POC.      Patient's most recent vital signs are:     Vital signs:  BP: 132/53  Temp: 98.7  HR: 98  RR: 18  SpO2: 95 %     Patient does not have new respiratory symptoms.  Patient does not have new sore throat.  Patient does not have a fever greater than 99.5.

## 2020-10-07 NOTE — PROGRESS NOTES
Met with patient to discuss discharge plan. Pt would like to pursue OP PT at [facility] in [place]. She reports needing to continue to work on getting out of bed. Spoke with patient's sister (Caty) who is very involved in her sister's care. Pat inquired about the utility of a recliner - to avoid having to push through her arms to get out of bed. Will have therapy call and discuss the options for patient. Writer gave contact information to patient and patient's sister for any additional discharge questions or needs. Betty Smith RN on 10/7/2020 at 9:59 AM

## 2020-10-07 NOTE — PROGRESS NOTES
Patient was seen, course reviewed with nursing staff.    Monika feels well, has been progressing in physical therapy.  She notes gradual improvement in lower extremity edema.  She notes mild urinary urgency without dysuria or abdominal pain.  She denies chest pain or shortness of breath at rest or with activity.    Vital signs stable  Blood pressure systolic 130s  Heart rate 90s to low 100s  O2 sats upper 90s room air    Afebrile    10/07/20 0645 80.5 kg (177 lb 8 oz) MC   10/06/20 0858 80.8 kg (178 lb 1.6 oz) AO   10/05/20 0601 81.3 kg (179 lb 4.8 oz) NC   10/04/20 0620 81.1 kg (178 lb 11.2 oz) NC   10/03/20 2051 82.4 kg (181 lb 11.2 oz) HK   10/02/20 1331 82.3 kg (181 lb 6.4 oz)      Alert, fully oriented, appears comfortable sitting up in bed  Lungs clear  CV regular rhythm, soft systolic murmur  Abdomen soft  Trace to 1+ lower extremity edema bilaterally.      Urine culture positive for Klebsiella, pansensitive    Assessment    Status post redo sternotomy with placement of mechanical aortic valve, prior history of bicuspid aortic valve with prior bio prosthetic valve placement.  Patient is doing well.      Lower extremity edema, gradually improving, on Lasix 20 mg daily.  Weight trending down.  Vital signs have been stable    Postop paroxysmal atrial flutter, on prior to admission metoprolol 50 mg twice daily.  Heart rhythm regular.  Mild tachycardia,   asymptomatic    Positive urine culture with mild urinary symptoms of urinary urgency.    Plan  Keflex for 7 days  Monitor INR closely with antibiotics  Continue furosemide 20 mg daily  BMP tomorrow  Probable discharge to home on 10/9/2020.

## 2020-10-07 NOTE — PLAN OF CARE
RN: Pt A/O x4, VSS. Pt is independent with transfer and ambulation using walker. Appetite good. Pt denies pain or SOB and has no complaints. Continue with POC.        Patient's most recent vital signs are:     Vital signs:  BP: 133/52  Temp: 98  HR: 110  RR: 18  SpO2: 96 %     Patient doesn't  have new respiratory symptoms.  Patient doesn't have new sore throat.  Patient doesn't have a fever greater than 99.5.

## 2020-10-07 NOTE — PLAN OF CARE
PT; SBA with bed mobility sup>sit--needs to use LE braced over EOB to aid pulling up trunk, does not use UE. Pt IND with ww for gait. Order for OP cardiac placed in Epic for MD to sign; information given to carlos to schedule--pt wants to go to [facility].

## 2020-10-08 ENCOUNTER — AMBULATORY - HEALTHEAST (OUTPATIENT)
Dept: OTHER | Facility: CLINIC | Age: 85
End: 2020-10-08

## 2020-10-08 ENCOUNTER — DOCUMENTATION ONLY (OUTPATIENT)
Dept: OTHER | Facility: CLINIC | Age: 85
End: 2020-10-08

## 2020-10-08 ENCOUNTER — APPOINTMENT (OUTPATIENT)
Dept: PHYSICAL THERAPY | Facility: SKILLED NURSING FACILITY | Age: 85
End: 2020-10-08
Payer: COMMERCIAL

## 2020-10-08 ENCOUNTER — APPOINTMENT (OUTPATIENT)
Dept: OCCUPATIONAL THERAPY | Facility: SKILLED NURSING FACILITY | Age: 85
End: 2020-10-08
Payer: COMMERCIAL

## 2020-10-08 LAB
ANION GAP SERPL CALCULATED.3IONS-SCNC: 5 MMOL/L (ref 3–14)
BUN SERPL-MCNC: 11 MG/DL (ref 7–30)
CALCIUM SERPL-MCNC: 8.7 MG/DL (ref 8.5–10.1)
CHLORIDE SERPL-SCNC: 103 MMOL/L (ref 94–109)
CO2 SERPL-SCNC: 29 MMOL/L (ref 20–32)
CREAT SERPL-MCNC: 0.94 MG/DL (ref 0.52–1.04)
GFR SERPL CREATININE-BSD FRML MDRD: 56 ML/MIN/{1.73_M2}
GLUCOSE SERPL-MCNC: 103 MG/DL (ref 70–99)
INR PPP: 2.24 (ref 0.86–1.14)
POTASSIUM SERPL-SCNC: 3.7 MMOL/L (ref 3.4–5.3)
SODIUM SERPL-SCNC: 137 MMOL/L (ref 133–144)

## 2020-10-08 PROCEDURE — 97110 THERAPEUTIC EXERCISES: CPT | Mod: GO | Performed by: OCCUPATIONAL THERAPIST

## 2020-10-08 PROCEDURE — 97530 THERAPEUTIC ACTIVITIES: CPT | Mod: GP | Performed by: PHYSICAL THERAPIST

## 2020-10-08 PROCEDURE — 250N000013 HC RX MED GY IP 250 OP 250 PS 637

## 2020-10-08 PROCEDURE — 97535 SELF CARE MNGMENT TRAINING: CPT | Mod: GO | Performed by: OCCUPATIONAL THERAPIST

## 2020-10-08 PROCEDURE — 80048 BASIC METABOLIC PNL TOTAL CA: CPT | Performed by: HOSPITALIST

## 2020-10-08 PROCEDURE — 85610 PROTHROMBIN TIME: CPT | Performed by: HOSPITALIST

## 2020-10-08 PROCEDURE — 120N000009 HC R&B SNF

## 2020-10-08 PROCEDURE — 250N000013 HC RX MED GY IP 250 OP 250 PS 637: Performed by: INTERNAL MEDICINE

## 2020-10-08 PROCEDURE — 36415 COLL VENOUS BLD VENIPUNCTURE: CPT | Performed by: HOSPITALIST

## 2020-10-08 PROCEDURE — 250N000013 HC RX MED GY IP 250 OP 250 PS 637: Performed by: HOSPITALIST

## 2020-10-08 RX ORDER — FUROSEMIDE 20 MG
20 TABLET ORAL DAILY
Qty: 30 TABLET | Refills: 1 | Status: SHIPPED | OUTPATIENT
Start: 2020-10-08

## 2020-10-08 RX ORDER — FUROSEMIDE 20 MG
20 TABLET ORAL DAILY
Qty: 30 TABLET | Refills: 1 | Status: SHIPPED | OUTPATIENT
Start: 2020-10-08 | End: 2020-10-08

## 2020-10-08 RX ORDER — SPIRONOLACTONE 25 MG/1
25 TABLET ORAL DAILY
Status: ON HOLD | COMMUNITY
End: 2020-10-08

## 2020-10-08 RX ORDER — POTASSIUM CHLORIDE 1500 MG/1
20 TABLET, EXTENDED RELEASE ORAL DAILY
Qty: 60 TABLET | Refills: 1 | Status: SHIPPED | OUTPATIENT
Start: 2020-10-09

## 2020-10-08 RX ORDER — WARFARIN SODIUM 4 MG/1
4 TABLET ORAL
Status: COMPLETED | OUTPATIENT
Start: 2020-10-08 | End: 2020-10-08

## 2020-10-08 RX ORDER — MUPIROCIN 20 MG/G
OINTMENT TOPICAL 2 TIMES DAILY
COMMUNITY
Start: 2020-10-08 | End: 2020-10-08

## 2020-10-08 RX ORDER — POTASSIUM CHLORIDE 1500 MG/1
20 TABLET, EXTENDED RELEASE ORAL DAILY
Qty: 60 TABLET | Refills: 1 | Status: SHIPPED | OUTPATIENT
Start: 2020-10-09 | End: 2020-10-08

## 2020-10-08 RX ORDER — WARFARIN SODIUM 4 MG/1
4 TABLET ORAL DAILY
COMMUNITY
Start: 2020-10-08 | End: 2020-10-08

## 2020-10-08 RX ORDER — MUPIROCIN 20 MG/G
OINTMENT TOPICAL 2 TIMES DAILY
COMMUNITY
Start: 2020-10-08

## 2020-10-08 RX ORDER — WARFARIN SODIUM 4 MG/1
4 TABLET ORAL DAILY
COMMUNITY
Start: 2020-10-08

## 2020-10-08 RX ORDER — POTASSIUM CHLORIDE 750 MG/1
20 TABLET, EXTENDED RELEASE ORAL DAILY
Status: DISCONTINUED | OUTPATIENT
Start: 2020-10-09 | End: 2020-10-09 | Stop reason: HOSPADM

## 2020-10-08 RX ADMIN — MULTIPLE VITAMINS W/ MINERALS TAB 1 TABLET: TAB at 08:58

## 2020-10-08 RX ADMIN — POTASSIUM CHLORIDE 10 MEQ: 750 TABLET, EXTENDED RELEASE ORAL at 08:57

## 2020-10-08 RX ADMIN — LATANOPROST 1 DROP: 50 SOLUTION OPHTHALMIC at 21:22

## 2020-10-08 RX ADMIN — PANTOPRAZOLE SODIUM 40 MG: 40 TABLET, DELAYED RELEASE ORAL at 06:27

## 2020-10-08 RX ADMIN — MUPIROCIN: 20 OINTMENT TOPICAL at 09:04

## 2020-10-08 RX ADMIN — DORZOLAMIDE HYDROCHLORIDE AND TIMOLOL MALEATE 1 DROP: 20; 5 SOLUTION/ DROPS OPHTHALMIC at 08:58

## 2020-10-08 RX ADMIN — ASPIRIN 81 MG CHEWABLE TABLET 81 MG: 81 TABLET CHEWABLE at 08:58

## 2020-10-08 RX ADMIN — MUPIROCIN: 20 OINTMENT TOPICAL at 21:22

## 2020-10-08 RX ADMIN — CALCIUM CARBONATE 600 MG (1,500 MG)-VITAMIN D3 400 UNIT TABLET 2 TABLET: at 08:57

## 2020-10-08 RX ADMIN — FUROSEMIDE 20 MG: 20 TABLET ORAL at 08:58

## 2020-10-08 RX ADMIN — WARFARIN SODIUM 4 MG: 4 TABLET ORAL at 18:10

## 2020-10-08 RX ADMIN — DORZOLAMIDE HYDROCHLORIDE AND TIMOLOL MALEATE 1 DROP: 20; 5 SOLUTION/ DROPS OPHTHALMIC at 21:22

## 2020-10-08 RX ADMIN — METHYLCELLULOSE 500 MG: 500 TABLET ORAL at 08:58

## 2020-10-08 RX ADMIN — METOPROLOL TARTRATE 50 MG: 25 TABLET, FILM COATED ORAL at 21:22

## 2020-10-08 RX ADMIN — SIMVASTATIN 20 MG: 20 TABLET, FILM COATED ORAL at 21:22

## 2020-10-08 RX ADMIN — CEPHALEXIN 250 MG: 250 CAPSULE ORAL at 21:21

## 2020-10-08 RX ADMIN — CEPHALEXIN 250 MG: 250 CAPSULE ORAL at 14:07

## 2020-10-08 RX ADMIN — METOPROLOL TARTRATE 50 MG: 25 TABLET, FILM COATED ORAL at 08:58

## 2020-10-08 RX ADMIN — ACETAMINOPHEN 650 MG: 325 TABLET, FILM COATED ORAL at 21:22

## 2020-10-08 RX ADMIN — CEPHALEXIN 250 MG: 250 CAPSULE ORAL at 08:58

## 2020-10-08 ASSESSMENT — MIFFLIN-ST. JEOR: SCORE: 1173.5

## 2020-10-08 NOTE — DISCHARGE SUMMARY
Admit Date:     10/02/2020   Discharge Date:  10/09/2020     Monika Bee is a very pleasant 85-year-old female who was admitted to the [facility] on 09/02/2020 for rehabilitation after undergoing a redo sternotomy with new mechanical AVR placement on 09/23/2020 by Dr. Khanna.  Her postoperative course was complicated by paroxysmal atrial fibrillation with spontaneous conversion to normal sinus rhythm and by mild volume overload, treated with furosemide.      The patient's course in the TCU was uncomplicated.  The following medical issues were addressed:     1.  Status post redo sternotomy with new mechanical aortic valve replacement.  The patient remained hemodynamically stable.  She made excellent progress in physical therapy and at the time of discharge is independent with her cares.  She had minimal pain, which was managed with acetaminophen.  Her incisions remained clean.   2.  Volume overload.  The patient normally takes spironolactone.  She was started on furosemide for volume overload postoperatively.  Weight on admission to the TCU was 82.3 kg.  Weight on the day prior to discharge was 80.5 kg.  She did not experience symptoms or signs of congestive heart failure.  Bilateral lower extremity edema persisted, though was gradually improving at the time of discharge.  She will remain on furosemide at least until seen by her primary care provider and by Cardiology.   3.  Paroxysmal atrial fibrillation.  There were no clinical episodes of atrial flutter or fibrillation.  She was maintained on metoprolol.  She is on chronic warfarin therapy for mechanical aortic valve.   4.  UTI.  The patient did complain of mild urinary urgency and frequency.  A urine culture was positive for Klebsiella, which was pansensitive.  She was started on Keflex on 10/07 for a planned 7-day course.   5.  Anemia secondary to acute blood loss.  Her hemoglobin remained stable at 8.7 when last checked on 10/05.      PHYSICAL  EXAMINATION:   GENERAL:  On the day of discharge, the patient felt well.  Was afebrile with stable vital signs.  She was alert, fully oriented and ambulating independently with a walker about the unit.   LUNGS:  Clear.   CHEST:  Sternotomy incision is intact.  Chest tube insertion sites were clean without significant drainage.   CARDIAC:  Regular rate and rhythm.  Soft systolic murmur.   ABDOMEN:  Soft.   EXTREMITIES:  There was 1-2+ edema left leg and 1+ edema right leg.  She was wearing compressive stockings.         The patient will be discharged to her home.  She will follow-up with her primary care provider in the next week and will also follow-up with her cardiologist in the [place] area in the next couple of weeks.  She will receive OT, PT and cardiac rehab.      DISCHARGE MEDICATIONS:  Aspirin 81 mg daily, which was started following her heart surgery, in addition to warfarin, Caltrate 600/400 two tablets daily, Keflex 250 mg 3 times a day for a 7-day total course to be concluded after 10/14, Cosopt ophthalmologic solution 1 drop twice daily, both eyes, furosemide 20 mg daily, Xalatan eyedrops 0.005% 1 drop both eyes at bedtime, Citrucel 500 mg daily, metoprolol 50 mg twice daily, multivitamins once a day, Bactroban to trocar wounds on her chest twice daily, potassium 20 mEq daily, Zocor 20 mg daily, Warfarin 4 mg daily, Protonix 40 mg daily (for GI prevention post op)      Next INR is to be obtained on 10/12.  Her INRs during hospitalization were in the low mid-2 range.  Goal is to 2-3.  There may be a transient bump in her INR secondary to her current antibiotic therapy for UTI.      At her follow-up cardiology appointment, a decision should be made regarding the need to continue aspirin in addition to warfarin and as well, whether to continue Protonix.     ROCIO HAIR MD             D: 10/08/2020   T: 10/08/2020   MT: LOBO      Name:     REYNA LOMBARDO   MRN:      [id number]        Account:         [id number]   :      1935           Admit Date:     10/02/2020                                  Discharge Date:  10/9/2020     Document: [id number]       cc: Gino Wall MD

## 2020-10-08 NOTE — PLAN OF CARE
Patient is A&O x 4, denies pain,  SOB or CP. LS clear, BS active. Indep in room LBM 10/7 chest dressings are CD&I.  Patient recent vitals  Vital signs:  BP: 136/53  Temp: 98  HR: 89  RR: 18  SpO2: 92 % at RA     Patient doesn't have new respiratory symptoms.  Patient doesn't have new sore throat.  Patient doesn't have a fever greater than 99.5.

## 2020-10-08 NOTE — PROGRESS NOTES
1. Have you had pain or hurting at any time in the last 5 days?     []YES  [x]NO  []UNABLE TO ANSWER    2. How much of the time have you experienced pain or hurting over the last 5 days?    []ALMOST CONSTANTLY []FREQUENTLY []OCCASIONALLY [x]RARELY []UNABLE TO ANSWER    3. Over the past 5 days, has pain made it hard for you to sleep at night?     []YES  [x]NO  []UNABLE TO ANSWER    4. Over the past 5 days, have you limited your day-to-day activities because of pain?     []YES  [x]NO  []UNABLE TO ANSWER    5. Pain intensity (Numeric scale used first-if patient unable to answer, verbal scale to be used.)    Numeric Scale: Please rate your worst pain over the last 5 days on a zero to ten scale, with zero being no pain and ten being the worst pain you can imagine.     Number:          0/10    Verbal Scale: Please rate the intensity of your worst pain over the last 5 days.     []MILD []MODERATE []SEVERE []VERY SEVERE/HORRIBLE []UNABLE TO ANSWER

## 2020-10-08 NOTE — PLAN OF CARE
Patient no complaint of pain and discomfort so far this shift. Continent of bladder, up to the BR x 2, no complaint of urinary discomfort, patient's on oral Keflex for UTI. Tentative discharge home on 10/09. Patient uses call light approprietly. Transfer with SBA, uses walker for ambulation. Patient sleeps in between care. Will continue with current plan of care.        Patient's most recent vital signs are:     Vital signs:  BP: 143/53  Temp: 97.1  HR: 105  RR: 18  SpO2: 97 %     Patient does not have new respiratory symptoms.  Patient does not have new sore throat.  Patient does not have a fever greater than 99.5.

## 2020-10-08 NOTE — PLAN OF CARE
RN: Pt A/O x4, VSS. Dyspnea on exertion, relieved by resting. Pt is independent with transfer and ambulation using walker in her room. Appetite good. Surgical incision remains CDI. Small amount of bleeding from one of the old chest tube site and skin tear site noticed, cleansed those area, dressing changed. Pt denies pain and has no complaint, very pleasant. Continue with POC.      Patient's most recent vital signs are:     Vital signs:  BP: 143/53  Temp: 97.1  HR: 105  RR: 18  SpO2: 97 %     Patient doesn't have new respiratory symptoms.  Patient doesn't have new sore throat.  Patient doesn't have a fever greater than 99.5.

## 2020-10-08 NOTE — PLAN OF CARE
Physical Therapy Discharge Summary    Reason for therapy discharge:    Discharged to home with outpatient therapy. 10/9. Pt plans to stay a few days at her sister's close by.    Progress towards therapy goal(s). See goals on Care Plan in [facility] electronic health record for goal details.  Goals partially met.  Barriers to achieving goals:   sternal precautions. Difficulty with sup>sit needing SBA>min A but pt plans to initially sleep in recliner.    Therapy recommendation(s):    Continued therapy is recommended.  Rationale/Recommendations:  OP Cardiac rehab at [facility] in [place].

## 2020-10-08 NOTE — PLAN OF CARE
Occupational Therapy Discharge Summary    Reason for therapy discharge:    Discharged to home with outpatient therapy.    Progress towards therapy goal(s). See goals on Care Plan in [facility] electronic health record for goal details.  Pt. Indep. With BADLs and functional mobility, amb. With FWW. Pt. Has all OT equipment needs met for home.     Therapy recommendation(s):    Continue home exercise program. OP CR; increase act. nel./strength within post op precautions to optimize home/community indep.

## 2020-10-09 ENCOUNTER — DOCUMENTATION ONLY (OUTPATIENT)
Dept: ANTICOAGULATION | Facility: CLINIC | Age: 85
End: 2020-10-09

## 2020-10-09 ENCOUNTER — TELEPHONE (OUTPATIENT)
Dept: CARDIOLOGY | Facility: CLINIC | Age: 85
End: 2020-10-09

## 2020-10-09 VITALS
SYSTOLIC BLOOD PRESSURE: 155 MMHG | BODY MASS INDEX: 35.48 KG/M2 | TEMPERATURE: 97.6 F | OXYGEN SATURATION: 95 % | HEART RATE: 94 BPM | RESPIRATION RATE: 16 BRPM | HEIGHT: 59 IN | WEIGHT: 176 LBS | DIASTOLIC BLOOD PRESSURE: 58 MMHG

## 2020-10-09 DIAGNOSIS — I35.0 AORTIC VALVE STENOSIS, ETIOLOGY OF CARDIAC VALVE DISEASE UNSPECIFIED: ICD-10-CM

## 2020-10-09 LAB — INR PPP: 2.43 (ref 0.86–1.14)

## 2020-10-09 PROCEDURE — 022N000001 HC SNF RUG CODE OPNP

## 2020-10-09 PROCEDURE — 250N000013 HC RX MED GY IP 250 OP 250 PS 637: Performed by: INTERNAL MEDICINE

## 2020-10-09 PROCEDURE — 250N000013 HC RX MED GY IP 250 OP 250 PS 637: Performed by: HOSPITALIST

## 2020-10-09 PROCEDURE — 85610 PROTHROMBIN TIME: CPT | Performed by: HOSPITALIST

## 2020-10-09 PROCEDURE — 36415 COLL VENOUS BLD VENIPUNCTURE: CPT | Performed by: HOSPITALIST

## 2020-10-09 PROCEDURE — 99315 NF DSCHRG MGMT 30 MIN/LESS: CPT | Performed by: INTERNAL MEDICINE

## 2020-10-09 RX ORDER — PANTOPRAZOLE SODIUM 40 MG/1
40 TABLET, DELAYED RELEASE ORAL DAILY
Qty: 30 TABLET | Refills: 0 | Status: SHIPPED | OUTPATIENT
Start: 2020-10-09

## 2020-10-09 RX ORDER — WARFARIN SODIUM 4 MG/1
4 TABLET ORAL
Status: DISCONTINUED | OUTPATIENT
Start: 2020-10-09 | End: 2020-10-09 | Stop reason: HOSPADM

## 2020-10-09 RX ADMIN — METOPROLOL TARTRATE 50 MG: 25 TABLET, FILM COATED ORAL at 08:21

## 2020-10-09 RX ADMIN — FUROSEMIDE 20 MG: 20 TABLET ORAL at 08:21

## 2020-10-09 RX ADMIN — ASPIRIN 81 MG CHEWABLE TABLET 81 MG: 81 TABLET CHEWABLE at 08:21

## 2020-10-09 RX ADMIN — CALCIUM CARBONATE 600 MG (1,500 MG)-VITAMIN D3 400 UNIT TABLET 2 TABLET: at 08:21

## 2020-10-09 RX ADMIN — DORZOLAMIDE HYDROCHLORIDE AND TIMOLOL MALEATE 1 DROP: 20; 5 SOLUTION/ DROPS OPHTHALMIC at 08:22

## 2020-10-09 RX ADMIN — PANTOPRAZOLE SODIUM 40 MG: 40 TABLET, DELAYED RELEASE ORAL at 06:38

## 2020-10-09 RX ADMIN — POTASSIUM CHLORIDE 20 MEQ: 750 TABLET, EXTENDED RELEASE ORAL at 08:20

## 2020-10-09 RX ADMIN — METHYLCELLULOSE 500 MG: 500 TABLET ORAL at 08:20

## 2020-10-09 RX ADMIN — CEPHALEXIN 250 MG: 250 CAPSULE ORAL at 08:20

## 2020-10-09 RX ADMIN — MULTIPLE VITAMINS W/ MINERALS TAB 1 TABLET: TAB at 08:21

## 2020-10-09 RX ADMIN — MUPIROCIN: 20 OINTMENT TOPICAL at 08:22

## 2020-10-09 ASSESSMENT — MIFFLIN-ST. JEOR: SCORE: 1166.7

## 2020-10-09 NOTE — TELEPHONE ENCOUNTER
Received call from patient's sister Caty and patient will be on the road on her way home at time of CVTS follow up phone call.  Pat asked appointment be changed to be sure patient is available to take call.  Changed appointment to 2:30 on Tuesday 10/13.  Patient's sister agreed to change.  Patient will be staying with her other sister Nydia Yo and the phone number to reach her is [phone].

## 2020-10-09 NOTE — PROGRESS NOTES
Reviewed discharge orders with the patient. Medication orders were reviewed and instructions given as to the frequency to take each med, along with when last medication was given. Patient belongings sent with patient. Patient instructed to follow up with primary physican with any further questions they may have. Patient states they understand discharge orders as they are written and has no questions. Patient was discharged at 1000 via car with friend.

## 2020-10-09 NOTE — PROGRESS NOTES
ANTICOAGULATION  MANAGEMENT    Monika Bee is being discharged from the [facility] Anticoagulation Management Program (ACC).    Reason for discharge: Pt already followed by [facility] under PCP    Anticoagulation episode resolved    If patient needs warfarin management in the future, please send a new referral

## 2020-10-09 NOTE — PLAN OF CARE
Patient up to bathroom with stand by assist, she denies pain and is using sternal precaution, she denies shortness of breath, incision to chest wall is open to air. Continue current plan of care.      Patient's most recent vital signs are:     Vital signs:  BP: 142/57  Temp: 97.6  HR: 115  RR: 18  SpO2: 97 %     Patientdoes not have new respiratory symptoms.  Patient does not have new sore throat.  Patient does not have a fever greater than 99.5.

## 2020-10-09 NOTE — PLAN OF CARE
Pt A&Ox4, able to make needs known. Denies chest pain, c/o dyspnea with ambulation that relieves with rest. VSS on RA. Temps 99, 99.6, given PRN tylenol x1, temp recheck 97.6. Pt denies adverse symptoms. C/o generalized soreness, managed with hot packs. Chest and abdominal dressings changed this shift. Cardiac/ sternal precautions maintained. Regular diet, tolerating well. Continent of bowel and bladder. Up independently in room. Discharge tomorrow. Call light within reach, will continue to monitor.     Patient's most recent vital signs are:     Vital signs:  BP: 142/57  Temp: 97.6  HR: 115  RR: 18  SpO2: 97 %     Patient does not have new respiratory symptoms.  Patient does not have new sore throat.  Patient does not have a fever greater than 99.5.

## 2020-10-12 ENCOUNTER — CARE COORDINATION (OUTPATIENT)
Dept: CARDIOLOGY | Facility: CLINIC | Age: 85
End: 2020-10-12

## 2020-10-12 NOTE — PROGRESS NOTES
Patient's sister Pat called with a concern that the patient has developed diarrhea from the antibiotic she is on for a UTI.  Patient was started on Keflex on 10/7 for a 7 day course.  Patient's sister is asking if patient should stop or slow down the antibiotics.  Recommended patient complete the course of Keflex as she is on her last 2 days and get an over the counter probiotic (Lactobacillus Acidophils) to take until the diarrhea resolves.  Explained importance of completing the Keflex to prevent reoccurrence of UTI which can be very serious.   Patient's sister verbalized understanding and states she will go to the pharmacy and speak to the pharmacist and see what they suggest.  Patient's sister will call with any further questions or concerns.

## 2020-10-13 ENCOUNTER — VIRTUAL VISIT (OUTPATIENT)
Dept: CARDIOLOGY | Facility: CLINIC | Age: 85
End: 2020-10-13
Attending: SURGERY
Payer: COMMERCIAL

## 2020-10-13 DIAGNOSIS — Z95.2 HISTORY OF AORTIC VALVE REPLACEMENT: Primary | ICD-10-CM

## 2020-10-13 PROCEDURE — 99024 POSTOP FOLLOW-UP VISIT: CPT | Performed by: PHYSICIAN ASSISTANT

## 2020-10-13 NOTE — PROGRESS NOTES
CARDIOTHORACIC SURGERY FOLLOW-UP VISIT     Monika Bee   1935   [id number]      Reason for visit: Post-Op mechanical AVR with Dr. Rainey on 9/23/2020    HPI: Monika Bee is a 85 year old year old female seen in clinic for a routine follow-up appointment after surgery. Patient has past medical history of prior MVR, HTN, HLD, and Vit D deficiency. Hospital course was remarkable for JAZZMINE and paroxysmal atrial flutter. Patient was discharged to inpatient rehab on 10/2/20.  Discharged from Rehab on 10/9/20.      Patient has been doing well since discharge and now returns to clinic for postop visit.  On Keflex for UTI (klebsiella) and also having some diarrhea. Started on probiotic since yesterday and may already be helping stools bulk up.      Patient endorses cough, infrequent. Still fatigued but this is improving.   More independent with ADLs. Using walker/cane and able to walk further.   Feels some palpitations at night when going to sleep, asymptomatic.     Patient reports incision is healing well.   Patient denies any fever, chest pain, edema, lightheadedness and nausea.    Patient is having Normal bowel movements and voiding without problems.    Has not been attending cardiac rehabilitation and that is going to start soon.      Patient is on Coumadin and INR is therapeutic.  Weight has been stable and back at pre-op weight.      PAST MEDICAL HISTORY:  Past Medical History:   Diagnosis Date     Aortic aneurysm (H) 9/19/2012    Mild dilation of the ascending aorta measuring 3.8cm. Mild dilation of the ascending aorta measuring 3.8cm.     Aortic stenosis 8/19/2020    Added automatically from request for surgery [id number]     Aortic valve disorder 7/21/2020    A. S/p aortic valve replacement 2004 with 21mm St. Romeo B. Last echocardiogram 2006 demonstrating armida functioning valve -- mean gradient of 16mmHg. Normal LV size and function, no other significant valve disease. A. S/p aortic valve replacement  2004 with 21mm St. Romeo B. Last echocardiogram 2006 demonstrating armida functioning valve -- mean gradient of 16mmHg. Normal LV size and function, no oth     Dyslipidemia 12/4/2013     Early dry stage nonexudative age-related macular degeneration of both eyes 1/13/2020     Hypertension, benign essential, goal below 140/90 12/4/2013     Long term current use of anticoagulant therapy 7/16/2009     Osteoarthrosis involving lower leg 7/21/2020     Ostium secundum type atrial septal defect 9/19/2012    Closed during aortic valve replacement surgically in 2004 Closed during aortic valve replacement surgically in 2004     PFO (patent foramen ovale) 9/19/2012    Closed during aortic valve replacement surgically in 2004 Closed during aortic valve replacement surgically in 2004     Vitamin D deficiency 1/18/2013       PAST SURGICAL HISTORY:  Past Surgical History:   Procedure Laterality Date     AS TOTAL KNEE ARTHROPLASTY Right 2015     CARPAL TUNNEL RELEASE RT/LT       CATARACT IOL, RT/LT       FEMUR SURGERY Left     fracture repair     REDO STERNOTOMY REPLACE VALVE AORTIC N/A 9/23/2020    Procedure: Redo sternotomy, lysis of adhesions, replacement of mechanical aortic valve with 19 mm St. Romeo Mechanical Heart Vave, on pump oxygenation.;  Surgeon: Jey Rainey MD;  Location: [facility]     REPAIR VALVE AORTIC  2004       CURRENT MEDICATIONS:   Current Outpatient Medications   Medication     acetaminophen (TYLENOL) 325 MG tablet     aspirin (ASA) 81 MG chewable tablet     calcium carbonate 600 mg-vitamin D 400 units (CALTRATE) 600-400 MG-UNIT per tablet     cephALEXin (KEFLEX) 250 MG capsule     dorzolamide-timolol (COSOPT) 2-0.5 % ophthalmic solution     furosemide (LASIX) 20 MG tablet     Methylcellulose, Laxative, (CITRUCEL PO)     metoprolol tartrate (LOPRESSOR) 25 MG tablet     Multiple Vitamins-Minerals (OCUVITE ADULT FORMULA PO)     mupirocin (BACTROBAN) 2 % external ointment     potassium chloride ER (KLOR-CON M) 20 MEQ  CR tablet     simvastatin (ZOCOR) 20 MG tablet     warfarin ANTICOAGULANT (COUMADIN) 4 MG tablet     carboxymethylcellulose PF (REFRESH PLUS) 0.5 % ophthalmic solution     latanoprost (XALATAN) 0.005 % ophthalmic solution     multivitamin (CENTRUM SILVER) tablet     pantoprazole (PROTONIX) 40 MG EC tablet     No current facility-administered medications for this visit.        ALLERGIES:      Allergies   Allergen Reactions     Blood Transfusion Related (Informational Only) Other (See Comments)     Patient has a history of a clinically significant antibody against RBC antigens.  A delay in compatible RBCs may occur.         ROS:  Review of symptoms otherwise negative unless commented about in HPI.     LABS:  Last Basic Metabolic Panel:  Lab Results   Component Value Date     10/08/2020      Lab Results   Component Value Date    POTASSIUM 3.7 10/08/2020     Lab Results   Component Value Date    CHLORIDE 103 10/08/2020     Lab Results   Component Value Date    RYANN 8.7 10/08/2020     Lab Results   Component Value Date    CO2 29 10/08/2020     Lab Results   Component Value Date    BUN 11 10/08/2020     Lab Results   Component Value Date    CR 0.94 10/08/2020     Lab Results   Component Value Date     10/08/2020       Last CBC:   Lab Results   Component Value Date    WBC 7.9 10/05/2020     Lab Results   Component Value Date    RBC 2.74 10/05/2020     Lab Results   Component Value Date    HGB 8.7 10/05/2020     Lab Results   Component Value Date    HCT 28.3 10/05/2020     No components found for: MCT  Lab Results   Component Value Date     10/05/2020     Lab Results   Component Value Date    MCH 31.8 10/05/2020     Lab Results   Component Value Date    MCHC 30.7 10/05/2020     Lab Results   Component Value Date    RDW 13.3 10/05/2020     Lab Results   Component Value Date     10/05/2020       INR:  Lab Results   Component Value Date    INR 2.43 10/09/2020    INR 2.24 10/08/2020    INR 2.30  10/07/2020       IMAGING:  None    PHYSICAL EXAM:   There were no vitals taken for this visit.     * all physical exam points taken from patient *   General: alert and oriented x 3, pleasant, no acute distress, normal mood and affect  Neuro: no focal deficits   CV: improved peripheral edema, able to wear her normal shoes   Pulm: easy work of breathing on the phone while talking   Incision: incisions clean dry and intact without erythema, swelling or drainage    PROCEDURES: None       ASSESSMENT/PLAN:  Monika Bee is a 85 year old year old female status post mechanical AVR who returns to clinic for postop visit.     1. Surgically doing well overall.  Incisions are healing well with no signs of infection. Increasing activity and strength overall.   2. Hemodynamics are stable. No medication changes were needed today.  3. Follow up with your cardiologist, Dr Jamarcus Tony, Nov 2, 2020.    4. Continue Outpatient Cardiac Rehab until completed.   5. Continue sternal precautions for 12 weeks from surgery date.   6. No driving for 4 weeks from surgery date.    7. She is seeing her PCP tomorrow for follow up, Dr Duke Christian.   8. Last INR was 3.1 and recheck Thursday.        The total time spent with the patient was 25 minutes, > 50% of which was spent in counseling.    CC  DUKE Tony

## 2020-10-13 NOTE — LETTER
"10/13/2020      RE: Monika Bee  [address]  [address]  [place] [place] [address]       Dear Colleague,    Thank you for the opportunity to participate in the care of your patient, Monika Bee, at the [facility]. Please see a copy of my visit note below.    Monika Bee is a 85 year old female who is being evaluated via a billable telephone visit.      The patient has been notified of following:     \"This telephone visit will be conducted via a call between you and your physician/provider. We have found that certain health care needs can be provided without the need for a physical exam.  This service lets us provide the care you need with a short phone conversation.  If a prescription is necessary we can send it directly to your pharmacy.  If lab work is needed we can place an order for that and you can then stop by our lab to have the test done at a later time.    Telephone visits are billed at different rates depending on your insurance coverage. During this emergency period, for some insurers they may be billed the same as an in-person visit.  Please reach out to your insurance provider with any questions.    If during the course of the call the physician/provider feels a telephone visit is not appropriate, you will not be charged for this service.\"    Patient has given verbal consent for Telephone visit?  Yes    What phone number would you like to be contacted at? [phone]    How would you like to obtain your AVS? Mail a copy      Vitals - Patient Reported  Pain Score: No Pain (0)(No)        CARDIOTHORACIC SURGERY FOLLOW-UP VISIT     Monika Bee   1935   [id number]      Reason for visit: Post-Op mechanical AVR with Dr. Rainey on 9/23/2020    HPI: Monika Bee is a 85 year old year old female seen in clinic for a routine follow-up appointment after surgery. Patient has past medical history of " prior MVR, HTN, HLD, and Vit D deficiency. Hospital course was remarkable for JAZZMINE and paroxysmal atrial flutter. Patient was discharged to inpatient rehab on 10/2/20.  Discharged from Rehab on 10/9/20.      Patient has been doing well since discharge and now returns to clinic for postop visit.  On Keflex for UTI (klebsiella) and also having some diarrhea. Started on probiotic since yesterday and may already be helping stools bulk up.      Patient endorses cough, infrequent. Still fatigued but this is improving.   More independent with ADLs. Using walker/cane and able to walk further.   Feels some palpitations at night when going to sleep, asymptomatic.     Patient reports incision is healing well.   Patient denies any fever, chest pain, edema, lightheadedness and nausea.    Patient is having Normal bowel movements and voiding without problems.    Has not been attending cardiac rehabilitation and that is going to start soon.      Patient is on Coumadin and INR is therapeutic.  Weight has been stable and back at pre-op weight.      PAST MEDICAL HISTORY:  Past Medical History:   Diagnosis Date     Aortic aneurysm (H) 9/19/2012    Mild dilation of the ascending aorta measuring 3.8cm. Mild dilation of the ascending aorta measuring 3.8cm.     Aortic stenosis 8/19/2020    Added automatically from request for surgery [id number]     Aortic valve disorder 7/21/2020    A. S/p aortic valve replacement 2004 with 21mm St. Romeo B. Last echocardiogram 2006 demonstrating armida functioning valve -- mean gradient of 16mmHg. Normal LV size and function, no other significant valve disease. A. S/p aortic valve replacement 2004 with 21mm St. Romeo B. Last echocardiogram 2006 demonstrating armida functioning valve -- mean gradient of 16mmHg. Normal LV size and function, no oth     Dyslipidemia 12/4/2013     Early dry stage nonexudative age-related macular degeneration of both eyes 1/13/2020     Hypertension, benign essential, goal below 140/90  12/4/2013     Long term current use of anticoagulant therapy 7/16/2009     Osteoarthrosis involving lower leg 7/21/2020     Ostium secundum type atrial septal defect 9/19/2012    Closed during aortic valve replacement surgically in 2004 Closed during aortic valve replacement surgically in 2004     PFO (patent foramen ovale) 9/19/2012    Closed during aortic valve replacement surgically in 2004 Closed during aortic valve replacement surgically in 2004     Vitamin D deficiency 1/18/2013       PAST SURGICAL HISTORY:  Past Surgical History:   Procedure Laterality Date     AS TOTAL KNEE ARTHROPLASTY Right 2015     CARPAL TUNNEL RELEASE RT/LT       CATARACT IOL, RT/LT       FEMUR SURGERY Left     fracture repair     REDO STERNOTOMY REPLACE VALVE AORTIC N/A 9/23/2020    Procedure: Redo sternotomy, lysis of adhesions, replacement of mechanical aortic valve with 19 mm St. Romeo Mechanical Heart Vave, on pump oxygenation.;  Surgeon: Jey Rainey MD;  Location: [facility] OR     REPAIR VALVE AORTIC  2004       CURRENT MEDICATIONS:   Current Outpatient Medications   Medication     acetaminophen (TYLENOL) 325 MG tablet     aspirin (ASA) 81 MG chewable tablet     calcium carbonate 600 mg-vitamin D 400 units (CALTRATE) 600-400 MG-UNIT per tablet     cephALEXin (KEFLEX) 250 MG capsule     dorzolamide-timolol (COSOPT) 2-0.5 % ophthalmic solution     furosemide (LASIX) 20 MG tablet     Methylcellulose, Laxative, (CITRUCEL PO)     metoprolol tartrate (LOPRESSOR) 25 MG tablet     Multiple Vitamins-Minerals (OCUVITE ADULT FORMULA PO)     mupirocin (BACTROBAN) 2 % external ointment     potassium chloride ER (KLOR-CON M) 20 MEQ CR tablet     simvastatin (ZOCOR) 20 MG tablet     warfarin ANTICOAGULANT (COUMADIN) 4 MG tablet     carboxymethylcellulose PF (REFRESH PLUS) 0.5 % ophthalmic solution     latanoprost (XALATAN) 0.005 % ophthalmic solution     multivitamin (CENTRUM SILVER) tablet     pantoprazole (PROTONIX) 40 MG EC tablet     No current  facility-administered medications for this visit.        ALLERGIES:      Allergies   Allergen Reactions     Blood Transfusion Related (Informational Only) Other (See Comments)     Patient has a history of a clinically significant antibody against RBC antigens.  A delay in compatible RBCs may occur.         ROS:  Review of symptoms otherwise negative unless commented about in HPI.     LABS:  Last Basic Metabolic Panel:  Lab Results   Component Value Date     10/08/2020      Lab Results   Component Value Date    POTASSIUM 3.7 10/08/2020     Lab Results   Component Value Date    CHLORIDE 103 10/08/2020     Lab Results   Component Value Date    RYANN 8.7 10/08/2020     Lab Results   Component Value Date    CO2 29 10/08/2020     Lab Results   Component Value Date    BUN 11 10/08/2020     Lab Results   Component Value Date    CR 0.94 10/08/2020     Lab Results   Component Value Date     10/08/2020       Last CBC:   Lab Results   Component Value Date    WBC 7.9 10/05/2020     Lab Results   Component Value Date    RBC 2.74 10/05/2020     Lab Results   Component Value Date    HGB 8.7 10/05/2020     Lab Results   Component Value Date    HCT 28.3 10/05/2020     No components found for: MCT  Lab Results   Component Value Date     10/05/2020     Lab Results   Component Value Date    MCH 31.8 10/05/2020     Lab Results   Component Value Date    MCHC 30.7 10/05/2020     Lab Results   Component Value Date    RDW 13.3 10/05/2020     Lab Results   Component Value Date     10/05/2020       INR:  Lab Results   Component Value Date    INR 2.43 10/09/2020    INR 2.24 10/08/2020    INR 2.30 10/07/2020       IMAGING:  None    PHYSICAL EXAM:   There were no vitals taken for this visit.     * all physical exam points taken from patient *   General: alert and oriented x 3, pleasant, no acute distress, normal mood and affect  Neuro: no focal deficits   CV: improved peripheral edema, able to wear her normal shoes    Pulm: easy work of breathing on the phone while talking   Incision: incisions clean dry and intact without erythema, swelling or drainage    PROCEDURES: None       ASSESSMENT/PLAN:  Monika Bee is a 85 year old year old female status post mechanical AVR who returns to clinic for postop visit.     1. Surgically doing well overall.  Incisions are healing well with no signs of infection. Increasing activity and strength overall.   2. Hemodynamics are stable. No medication changes were needed today.  3. Follow up with your cardiologist, Dr Jamarcus Tony, Nov 2, 2020.    4. Continue Outpatient Cardiac Rehab until completed.   5. Continue sternal precautions for 12 weeks from surgery date.   6. No driving for 4 weeks from surgery date.    7. She is seeing her PCP tomorrow for follow up, Dr Duke Christian.   8. Last INR was 3.1 and recheck Thursday.        The total time spent with the patient was 25 minutes, > 50% of which was spent in counseling.    CC  DUKE Tony       Please do not hesitate to contact me if you have any questions/concerns.     Sincerely,     Cardiovascular Thoracic Surgery

## 2020-10-13 NOTE — PROGRESS NOTES
"Monika Bee is a 85 year old female who is being evaluated via a billable telephone visit.      The patient has been notified of following:     \"This telephone visit will be conducted via a call between you and your physician/provider. We have found that certain health care needs can be provided without the need for a physical exam.  This service lets us provide the care you need with a short phone conversation.  If a prescription is necessary we can send it directly to your pharmacy.  If lab work is needed we can place an order for that and you can then stop by our lab to have the test done at a later time.    Telephone visits are billed at different rates depending on your insurance coverage. During this emergency period, for some insurers they may be billed the same as an in-person visit.  Please reach out to your insurance provider with any questions.    If during the course of the call the physician/provider feels a telephone visit is not appropriate, you will not be charged for this service.\"    Patient has given verbal consent for Telephone visit?  Yes    What phone number would you like to be contacted at? [phone]    How would you like to obtain your AVS? Mail a copy      Vitals - Patient Reported  Pain Score: No Pain (0)(No)      "

## 2021-01-10 ENCOUNTER — HEALTH MAINTENANCE LETTER (OUTPATIENT)
Age: 86
End: 2021-01-10

## 2021-05-31 ENCOUNTER — RECORDS - HEALTHEAST (OUTPATIENT)
Dept: ADMINISTRATIVE | Facility: CLINIC | Age: 86
End: 2021-05-31

## 2022-02-17 PROBLEM — M17.10 UNILATERAL PRIMARY OSTEOARTHRITIS, UNSPECIFIED KNEE: Status: ACTIVE | Noted: 2020-07-21

## (undated) DEVICE — TOURNIQUET VASCULAR KIT 7 1/2" 79012

## (undated) DEVICE — SUCTION DRY CHEST DRAIN OASIS 3600-100

## (undated) DEVICE — LEAD PACER MYOCARDIAL BIPOLAR TEMPORARY 53CM 6495F

## (undated) DEVICE — SU ETHIBOND 3-0 BBDA 36" X588H

## (undated) DEVICE — GLOVE PROTEXIS POWDER FREE 7.0 ORTHOPEDIC 2D73ET70

## (undated) DEVICE — ESU ELEC BLADE 2.75" COATED/INSULATED E1455

## (undated) DEVICE — SU ETHIBOND 2-0 V-5SA 10X30" SXP52

## (undated) DEVICE — LINEN TOWEL PACK X6 WHITE 5487

## (undated) DEVICE — ESU HOLSTER PLASTIC DISP E2400

## (undated) DEVICE — PREP CHLORAPREP 26ML TINTED ORANGE  260815

## (undated) DEVICE — RX SURGIFLO HEMOSTATIC MATRIX W/THROMBIN 8ML 2994

## (undated) DEVICE — DRAIN CHEST TUBE 28FR STR 8028

## (undated) DEVICE — SOL NACL 0.9% IRRIG 3000ML BAG 2B7477

## (undated) DEVICE — SU STEEL MYO/WIRE II STERNOTOMY 8 BE-1 3X14" 048-217

## (undated) DEVICE — SU PLEDGET SOFT TFE 3/8"X3/26"X1/16" PCP40

## (undated) DEVICE — SU PROLENE 5-0 RB-2DA 30" 8710H

## (undated) DEVICE — SURGICEL HEMOSTAT 4X8" 1952

## (undated) DEVICE — DRAPE IOBAN INCISE 23X17" 6650EZ

## (undated) DEVICE — SPONGE RAY-TEC 4X8" 7318

## (undated) DEVICE — ADH SKIN CLOSURE PREMIERPRO EXOFIN 1.0ML 3470

## (undated) DEVICE — SU ETHIBOND 2-0 SHDA 30" X563H

## (undated) DEVICE — NDL COUNTER 20CT 31142493

## (undated) DEVICE — PACK ADULT HEART UMMC PV15CG92D

## (undated) DEVICE — SU PROLENE 4-0 SHDA 36" 8521H

## (undated) DEVICE — DRSG DRAIN 4X4" 7086

## (undated) DEVICE — DRAIN CHEST TUBE 36FR STR 8036

## (undated) DEVICE — LINEN TOWEL PACK X30 5481

## (undated) DEVICE — SUCTION CATH AIRLIFE TRI-FLO W/CONTROL PORT 14FR  T60C

## (undated) DEVICE — SOL NACL 0.9% 10ML VIAL 0409-4888-02

## (undated) DEVICE — SU ETHIBOND 0 CT-1 CR 8X18" CX21D

## (undated) DEVICE — DRSG ADAPTIC 3X16"  6114

## (undated) DEVICE — SOL NACL 0.9% IRRIG 1000ML BOTTLE 2F7124

## (undated) DEVICE — DRSG ABDOMINAL 07 1/2X8" 7197D

## (undated) DEVICE — TIES BANDING T50R

## (undated) DEVICE — SU SILK 0 TIE 6X30" A306H

## (undated) DEVICE — SU VICRYL 0 CTX 36" J370H

## (undated) DEVICE — SU VICRYL 3-0 FS-1 27" J442H

## (undated) DEVICE — TAPE MEDIPORE 4"X2YD 2864

## (undated) DEVICE — SOL ADH LIQUID BENZOIN SWAB 0.6ML C1544

## (undated) DEVICE — PROTECTOR ARM ONE-STEP TRENDELENBURG 40418

## (undated) DEVICE — SU PROLENE 3-0 SHDA 36" 8522H

## (undated) DEVICE — DRAPE SLUSH/WARMER 66X44" ORS-320

## (undated) DEVICE — CANISTER WOUND VAC W/GEL 1000ML M8275093/5

## (undated) DEVICE — DEFIB PRO-PADZ LVP LQD GEL ADULT 8900-2105-01

## (undated) DEVICE — DRSG TELFA 3X8" 1238

## (undated) DEVICE — SU PROLENE 4-0 RB-1DA 36" 8557H

## (undated) DEVICE — BLADE CLIPPER SGL USE 9680

## (undated) DEVICE — BNDG ABDOMINAL BINDER 12X72-96" 08140496

## (undated) DEVICE — ESU ELEC BLADE 6" COATED/INSULATED E1455-6

## (undated) DEVICE — SU STEEL 6 CCS 4X18" M654G

## (undated) DEVICE — TUBING INSUFFLATION W/FILTER CPC TO LUER 620-030-301

## (undated) DEVICE — BLADE SAW SAGITTAL STERNOTOMY CV SM LINVATEC 5023-187

## (undated) DEVICE — CONNECTOR DRAIN CHEST Y EXTENSION SET 19909

## (undated) DEVICE — DRSG WOUND VAC SPONGE MED BLACK M8275052/5

## (undated) DEVICE — WIPES FOLEY CARE SURESTEP PROVON DFC100

## (undated) RX ORDER — FENTANYL CITRATE 50 UG/ML
INJECTION, SOLUTION INTRAMUSCULAR; INTRAVENOUS
Status: DISPENSED
Start: 2020-09-23

## (undated) RX ORDER — FENTANYL CITRATE 50 UG/ML
INJECTION, SOLUTION INTRAMUSCULAR; INTRAVENOUS
Status: DISPENSED
Start: 2020-10-01

## (undated) RX ORDER — HEPARIN SODIUM 1000 [USP'U]/ML
INJECTION, SOLUTION INTRAVENOUS; SUBCUTANEOUS
Status: DISPENSED
Start: 2020-09-23

## (undated) RX ORDER — PROPOFOL 10 MG/ML
INJECTION, EMULSION INTRAVENOUS
Status: DISPENSED
Start: 2020-09-23

## (undated) RX ORDER — HYDROMORPHONE HYDROCHLORIDE 1 MG/ML
INJECTION, SOLUTION INTRAMUSCULAR; INTRAVENOUS; SUBCUTANEOUS
Status: DISPENSED
Start: 2020-09-23

## (undated) RX ORDER — LIDOCAINE HYDROCHLORIDE 20 MG/ML
INJECTION, SOLUTION EPIDURAL; INFILTRATION; INTRACAUDAL; PERINEURAL
Status: DISPENSED
Start: 2020-09-23

## (undated) RX ORDER — FENTANYL CITRATE-0.9 % NACL/PF 10 MCG/ML
PLASTIC BAG, INJECTION (ML) INTRAVENOUS
Status: DISPENSED
Start: 2020-09-23

## (undated) RX ORDER — MUPIROCIN 20 MG/G
OINTMENT TOPICAL
Status: DISPENSED
Start: 2020-09-23

## (undated) RX ORDER — CEFAZOLIN SODIUM 1 G/3ML
INJECTION, POWDER, FOR SOLUTION INTRAMUSCULAR; INTRAVENOUS
Status: DISPENSED
Start: 2020-09-23

## (undated) RX ORDER — CEFAZOLIN SODIUM 2 G/100ML
INJECTION, SOLUTION INTRAVENOUS
Status: DISPENSED
Start: 2020-09-23

## (undated) RX ORDER — LIDOCAINE HYDROCHLORIDE 20 MG/ML
SOLUTION OROPHARYNGEAL
Status: DISPENSED
Start: 2020-10-01

## (undated) RX ORDER — GLYCOPYRROLATE 0.2 MG/ML
INJECTION, SOLUTION INTRAMUSCULAR; INTRAVENOUS
Status: DISPENSED
Start: 2020-09-23

## (undated) RX ORDER — PANTOPRAZOLE SODIUM 40 MG/1
TABLET, DELAYED RELEASE ORAL
Status: DISPENSED
Start: 2020-09-23

## (undated) RX ORDER — ACETAMINOPHEN 325 MG/1
TABLET ORAL
Status: DISPENSED
Start: 2020-09-23

## (undated) RX ORDER — EPHEDRINE SULFATE 50 MG/ML
INJECTION, SOLUTION INTRAMUSCULAR; INTRAVENOUS; SUBCUTANEOUS
Status: DISPENSED
Start: 2020-09-23